# Patient Record
Sex: FEMALE | Race: WHITE
[De-identification: names, ages, dates, MRNs, and addresses within clinical notes are randomized per-mention and may not be internally consistent; named-entity substitution may affect disease eponyms.]

---

## 2017-10-20 ENCOUNTER — HOSPITAL ENCOUNTER (EMERGENCY)
Dept: HOSPITAL 47 - EC | Age: 37
Discharge: HOME | End: 2017-10-20
Payer: COMMERCIAL

## 2017-10-20 VITALS — DIASTOLIC BLOOD PRESSURE: 62 MMHG | SYSTOLIC BLOOD PRESSURE: 109 MMHG | HEART RATE: 87 BPM

## 2017-10-20 VITALS — TEMPERATURE: 98 F

## 2017-10-20 VITALS — RESPIRATION RATE: 18 BRPM

## 2017-10-20 DIAGNOSIS — F17.200: ICD-10-CM

## 2017-10-20 DIAGNOSIS — Z88.8: ICD-10-CM

## 2017-10-20 DIAGNOSIS — I25.2: ICD-10-CM

## 2017-10-20 DIAGNOSIS — Z88.1: ICD-10-CM

## 2017-10-20 DIAGNOSIS — Z86.14: ICD-10-CM

## 2017-10-20 DIAGNOSIS — Z88.0: ICD-10-CM

## 2017-10-20 DIAGNOSIS — Z79.899: ICD-10-CM

## 2017-10-20 DIAGNOSIS — O03.4: Primary | ICD-10-CM

## 2017-10-20 DIAGNOSIS — Z79.82: ICD-10-CM

## 2017-10-20 DIAGNOSIS — Z91.048: ICD-10-CM

## 2017-10-20 DIAGNOSIS — O99.331: ICD-10-CM

## 2017-10-20 DIAGNOSIS — O99.411: ICD-10-CM

## 2017-10-20 DIAGNOSIS — Z3A.10: ICD-10-CM

## 2017-10-20 DIAGNOSIS — Z91.040: ICD-10-CM

## 2017-10-20 LAB
ALP SERPL-CCNC: 81 U/L (ref 38–126)
ALT SERPL-CCNC: 27 U/L (ref 9–52)
ANION GAP SERPL CALC-SCNC: 10 MMOL/L
APTT BLD: 23 SEC (ref 22–30)
AST SERPL-CCNC: 21 U/L (ref 14–36)
BASOPHILS # BLD AUTO: 0 K/UL (ref 0–0.2)
BASOPHILS NFR BLD AUTO: 0 %
BUN SERPL-SCNC: 6 MG/DL (ref 7–17)
CALCIUM SPEC-MCNC: 8.9 MG/DL (ref 8.4–10.2)
CH: 29.6
CHCM: 33.1
CHLORIDE SERPL-SCNC: 109 MMOL/L (ref 98–107)
CO2 SERPL-SCNC: 18 MMOL/L (ref 22–30)
EOSINOPHIL # BLD AUTO: 0.2 K/UL (ref 0–0.7)
EOSINOPHIL NFR BLD AUTO: 2 %
ERYTHROCYTE [DISTWIDTH] IN BLOOD BY AUTOMATED COUNT: 4.29 M/UL (ref 3.8–5.4)
ERYTHROCYTE [DISTWIDTH] IN BLOOD: 13.3 % (ref 11.5–15.5)
GLUCOSE SERPL-MCNC: 114 MG/DL (ref 74–99)
HCT VFR BLD AUTO: 38.5 % (ref 34–46)
HDW: 2.41
HGB BLD-MCNC: 12.3 GM/DL (ref 11.4–16)
INR PPP: 1 (ref ?–1.2)
LUC NFR BLD AUTO: 1 %
LYMPHOCYTES # SPEC AUTO: 2.3 K/UL (ref 1–4.8)
LYMPHOCYTES NFR SPEC AUTO: 21 %
MAGNESIUM SPEC-SCNC: 1.6 MG/DL (ref 1.6–2.3)
MCH RBC QN AUTO: 28.7 PG (ref 25–35)
MCHC RBC AUTO-ENTMCNC: 31.9 G/DL (ref 31–37)
MCV RBC AUTO: 89.8 FL (ref 80–100)
MONOCYTES # BLD AUTO: 0.3 K/UL (ref 0–1)
MONOCYTES NFR BLD AUTO: 3 %
NEUTROPHILS # BLD AUTO: 8 K/UL (ref 1.3–7.7)
NEUTROPHILS NFR BLD AUTO: 73 %
NON-AFRICAN AMERICAN GFR(MDRD): >60
PHOSPHATE SERPL-MCNC: 3.3 MG/DL (ref 2.5–4.5)
POTASSIUM SERPL-SCNC: 4 MMOL/L (ref 3.5–5.1)
PROT SERPL-MCNC: 6.3 G/DL (ref 6.3–8.2)
PT BLD: 9.9 SEC (ref 9–12)
SODIUM SERPL-SCNC: 137 MMOL/L (ref 137–145)
WBC # BLD AUTO: 0.06 10*3/UL
WBC # BLD AUTO: 11 K/UL (ref 3.8–10.6)
WBC (PEROX): 10.72

## 2017-10-20 PROCEDURE — 76801 OB US < 14 WKS SINGLE FETUS: CPT

## 2017-10-20 PROCEDURE — 86900 BLOOD TYPING SEROLOGIC ABO: CPT

## 2017-10-20 PROCEDURE — 86850 RBC ANTIBODY SCREEN: CPT

## 2017-10-20 PROCEDURE — 85025 COMPLETE CBC W/AUTO DIFF WBC: CPT

## 2017-10-20 PROCEDURE — 84702 CHORIONIC GONADOTROPIN TEST: CPT

## 2017-10-20 PROCEDURE — 99284 EMERGENCY DEPT VISIT MOD MDM: CPT

## 2017-10-20 PROCEDURE — 86901 BLOOD TYPING SEROLOGIC RH(D): CPT

## 2017-10-20 PROCEDURE — 80053 COMPREHEN METABOLIC PANEL: CPT

## 2017-10-20 PROCEDURE — 96361 HYDRATE IV INFUSION ADD-ON: CPT

## 2017-10-20 PROCEDURE — 85730 THROMBOPLASTIN TIME PARTIAL: CPT

## 2017-10-20 PROCEDURE — 83735 ASSAY OF MAGNESIUM: CPT

## 2017-10-20 PROCEDURE — 84100 ASSAY OF PHOSPHORUS: CPT

## 2017-10-20 PROCEDURE — 85610 PROTHROMBIN TIME: CPT

## 2017-10-20 PROCEDURE — 36415 COLL VENOUS BLD VENIPUNCTURE: CPT

## 2017-10-20 PROCEDURE — 96374 THER/PROPH/DIAG INJ IV PUSH: CPT

## 2017-10-20 NOTE — US
EXAMINATION TYPE: US OB <= 14 wk fetus

 

DATE OF EXAM: 10/20/2017

 

COMPARISON: NONE

 

CLINICAL HISTORY: Patient having heavy bleeding and severe cramping. Patient states that she has know
 for 3 or 4 weeks that she did not have a viable pregnancy. They were waiting for her to miscarry due
 to the fact that she is not a candidate for D &C due the fact that she recently had a heart attack. 
Patient morbidly obese.

 

EXAM PERFORMED:  Transabdominal (TA)

 

EXAM MEASUREMENTS:

 

GESTATIONAL AGE / DATING

 

Physician Established:  not established

Dates by LMP:  (14 weeks/0 days)  

** EDC: 4/20/18

Dates by First Scan:  not available 

Dates by Current Scan for: Unable to date by today's study 

 

MATERNAL ANATOMY 

 

Uterus: 12.5 x 6.2 x 7.2cm

Right Ovary: 2.4 x 1.8 x1.8cm

Left Ovary: 3.4 x 2.5 x 3.0cm

Post CDS / Adnexa: wnl

 

 

GESTATION / FETAL SURVEY

 

 

MSD: 1.7cm (6 weeks/3 days)

 

 

Date of LMP: 7/14/17

 

 

Probable gestational sac seen in lower uterine segment. 

 

Findings suggestive of a cystic focus associated with the left ovary

 

IMPRESSION:

Transabdominal imaging shows possible gestational sac as described. Imaging not optimal. Follow-up as
 indicated. No fetal pole identified.

## 2017-10-20 NOTE — ED
General Adult HPI





- General


Chief complaint: Vaginal Bleeding


Stated complaint: bleeding pregnancy


Time Seen by Provider: 10/20/17 11:57


Source: patient, RN notes reviewed, old records reviewed


Mode of arrival: wheelchair


Limitations: no limitations





- History of Present Illness


Initial comments: 





This is a 37-year-old female to the ER for evaluation regarding vaginal 

bleeding.  Patient is a  prior miscarriage currently undergoing 

miscarriage.  Patient states she is having significant bleeding today.  She 

states she is about 10 weeks pregnant.  No nausea no vomiting no feelings of 

lightheadedness or dizziness.  Patient has had multiple Occasions with prior 

pregnancies including preeclampsia high blood pressure and diabetes.  Patient 

states she has been having some bleeding for a few days and knows that she was 

having a miscarriage but the bleeding was more excessive today and with clots.  

Patient has mild abdominal cramping but no pain.





- Related Data


 Home Medications











 Medication  Instructions  Recorded  Confirmed


 


Aspirin [Adult Low Dose Aspirin EC] 81 mg PO DAILY 10/20/17 10/20/17


 


HYDROcodone/APAP 5-325MG [Norco 1 tab PO Q4HR PRN 10/20/17 10/20/17





5-325]   


 


Ibuprofen [Motrin] 600 mg PO Q6HR PRN 10/20/17 10/20/17


 


Ranitidine HCl [Zantac] 150 mg PO HS 10/20/17 10/20/17











 Allergies











Allergy/AdvReac Type Severity Reaction Status Date / Time


 


adhesive Allergy   Verified 10/20/17 12:11


 


albuterol Allergy   Verified 10/20/17 12:11


 


amoxicillin Allergy   Verified 10/20/17 12:11


 


ampicillin Allergy   Verified 10/20/17 12:11


 


azithromycin [From Zithromax] Allergy   Verified 10/20/17 12:11


 


erythromycin base Allergy   Verified 10/20/17 12:11


 


latex Allergy   Verified 10/20/17 12:11


 


penicillin V Allergy   Verified 10/20/17 12:11














Review of Systems


ROS Statement: 


Those systems with pertinent positive or pertinent negative responses have been 

documented in the HPI.





ROS Other: All systems not noted in ROS Statement are negative.





Past Medical History


Past Medical History: Myocardial Infarction (MI)


History of Any Multi-Drug Resistant Organisms: MRSA


Date of last positivie culture/infection: 


MDRO Source:: abscess abdomen


Past Surgical History:  Section


Past Psychological History: No Psychological Hx Reported


Smoking Status: Current every day smoker


Past Alcohol Use History: None Reported


Past Drug Use History: None Reported





General Exam


Limitations: no limitations


General appearance: alert, in no apparent distress


Head exam: Present: atraumatic, normocephalic, normal inspection


Eye exam: Present: normal appearance, PERRL, EOMI.  Absent: scleral icterus, 

conjunctival injection, periorbital swelling


ENT exam: Present: normal exam, mucous membranes moist


Neck exam: Present: normal inspection.  Absent: tenderness, meningismus, 

lymphadenopathy


Respiratory exam: Present: normal lung sounds bilaterally.  Absent: respiratory 

distress, wheezes, rales, rhonchi, stridor


Cardiovascular Exam: Present: regular rate, normal rhythm, normal heart sounds.

  Absent: systolic murmur, diastolic murmur, rubs, gallop, clicks


GI/Abdominal exam: Present: soft, normal bowel sounds.  Absent: distended, 

tenderness, guarding, rebound, rigid


Extremities exam: Present: normal inspection, full ROM, normal capillary 

refill.  Absent: tenderness, pedal edema, joint swelling, calf tenderness


Back exam: Present: normal inspection


Neurological exam: Present: alert, oriented X3, CN II-XII intact


Psychiatric exam: Present: normal affect, normal mood


Skin exam: Present: warm, dry, intact, normal color.  Absent: rash





Course


 Vital Signs











  10/20/17 10/20/17 10/20/17





  11:49 12:53 13:00


 


Temperature 98 F  


 


Pulse Rate 104 H 80 89


 


Respiratory 16 18 18





Rate   


 


Blood Pressure 134/77 128/75 118/70


 


O2 Sat by Pulse 100 96 97





Oximetry   














  10/20/17





  14:00


 


Temperature 


 


Pulse Rate 87


 


Respiratory 18





Rate 


 


Blood Pressure 109/62


 


O2 Sat by Pulse 98





Oximetry 














- Reevaluation(s)


Reevaluation #1: 





10/20/17 13:09


The patient's resting comfortably at this time, no significant bleeding noted


Reevaluation #2: 





10/20/17 14:15


Vaginal exam completed, blood evacuated from patient's vaginal vault





Medical Decision Making





- Medical Decision Making





37 female currently going to miscarriage, labwork is normal he will is normal, 

patient is asymptomatic, patient's bleeding is persistent.  Patient will be 

discharged home





- Lab Data


Result diagrams: 


 10/20/17 12:19





 10/20/17 12:19


 Lab Results











  10/20/17 10/20/17 10/20/17 Range/Units





  12:19 12:19 12:19 


 


WBC     (3.8-10.6)  k/uL


 


RBC     (3.80-5.40)  m/uL


 


Hgb     (11.4-16.0)  gm/dL


 


Hct     (34.0-46.0)  %


 


MCV     (80.0-100.0)  fL


 


MCH     (25.0-35.0)  pg


 


MCHC     (31.0-37.0)  g/dL


 


RDW     (11.5-15.5)  %


 


Plt Count     (150-450)  k/uL


 


Neutrophils %     %


 


Lymphocytes %     %


 


Monocytes %     %


 


Eosinophils %     %


 


Basophils %     %


 


Neutrophils #     (1.3-7.7)  k/uL


 


Lymphocytes #     (1.0-4.8)  k/uL


 


Monocytes #     (0-1.0)  k/uL


 


Eosinophils #     (0-0.7)  k/uL


 


Basophils #     (0-0.2)  k/uL


 


PT    9.9  (9.0-12.0)  sec


 


INR    1.0  (<1.2)  


 


APTT    23.0  (22.0-30.0)  sec


 


Sodium  137    (137-145)  mmol/L


 


Potassium  4.0    (3.5-5.1)  mmol/L


 


Chloride  109 H    ()  mmol/L


 


Carbon Dioxide  18 L    (22-30)  mmol/L


 


Anion Gap  10    mmol/L


 


BUN  6 L    (7-17)  mg/dL


 


Creatinine  0.61    (0.52-1.04)  mg/dL


 


Est GFR (MDRD) Af Amer  >60    (>60 ml/min/1.73 sqM)  


 


Est GFR (MDRD) Non-Af  >60    (>60 ml/min/1.73 sqM)  


 


Glucose  114 H    (74-99)  mg/dL


 


Calcium  8.9    (8.4-10.2)  mg/dL


 


Phosphorus  3.3    (2.5-4.5)  mg/dL


 


Magnesium  1.6    (1.6-2.3)  mg/dL


 


Total Bilirubin  0.3    (0.2-1.3)  mg/dL


 


AST  21    (14-36)  U/L


 


ALT  27    (9-52)  U/L


 


Alkaline Phosphatase  81    ()  U/L


 


Total Protein  6.3    (6.3-8.2)  g/dL


 


Albumin  3.5    (3.5-5.0)  g/dL


 


HCG, Quant  2574.6    mIU/mL


 


Blood Type   A Positive   


 


Blood Type Recheck   CABO Indicated   


 


Antibody Screen   NEGATIVE   


 


Spec Expiration Date   10/23/2017 - 2319   














  10/20/17 Range/Units





  12:19 


 


WBC  11.0 H  (3.8-10.6)  k/uL


 


RBC  4.29  (3.80-5.40)  m/uL


 


Hgb  12.3  (11.4-16.0)  gm/dL


 


Hct  38.5  (34.0-46.0)  %


 


MCV  89.8  (80.0-100.0)  fL


 


MCH  28.7  (25.0-35.0)  pg


 


MCHC  31.9  (31.0-37.0)  g/dL


 


RDW  13.3  (11.5-15.5)  %


 


Plt Count  273  (150-450)  k/uL


 


Neutrophils %  73  %


 


Lymphocytes %  21  %


 


Monocytes %  3  %


 


Eosinophils %  2  %


 


Basophils %  0  %


 


Neutrophils #  8.0 H  (1.3-7.7)  k/uL


 


Lymphocytes #  2.3  (1.0-4.8)  k/uL


 


Monocytes #  0.3  (0-1.0)  k/uL


 


Eosinophils #  0.2  (0-0.7)  k/uL


 


Basophils #  0.0  (0-0.2)  k/uL


 


PT   (9.0-12.0)  sec


 


INR   (<1.2)  


 


APTT   (22.0-30.0)  sec


 


Sodium   (137-145)  mmol/L


 


Potassium   (3.5-5.1)  mmol/L


 


Chloride   ()  mmol/L


 


Carbon Dioxide   (22-30)  mmol/L


 


Anion Gap   mmol/L


 


BUN   (7-17)  mg/dL


 


Creatinine   (0.52-1.04)  mg/dL


 


Est GFR (MDRD) Af Amer   (>60 ml/min/1.73 sqM)  


 


Est GFR (MDRD) Non-Af   (>60 ml/min/1.73 sqM)  


 


Glucose   (74-99)  mg/dL


 


Calcium   (8.4-10.2)  mg/dL


 


Phosphorus   (2.5-4.5)  mg/dL


 


Magnesium   (1.6-2.3)  mg/dL


 


Total Bilirubin   (0.2-1.3)  mg/dL


 


AST   (14-36)  U/L


 


ALT   (9-52)  U/L


 


Alkaline Phosphatase   ()  U/L


 


Total Protein   (6.3-8.2)  g/dL


 


Albumin   (3.5-5.0)  g/dL


 


HCG, Quant   mIU/mL


 


Blood Type   


 


Blood Type Recheck   


 


Antibody Screen   


 


Spec Expiration Date   














- Radiology Data


Radiology results: report reviewed (Ultrasound positive for fetal demise), 

image reviewed





Disposition


Clinical Impression: 


 Incomplete , Missed , Vaginal bleeding





Disposition: HOME SELF-CARE


Condition: Good


Instructions:  Miscarriage (ED)


Referrals: 


Katy Tay MD [Primary Care Provider] - 1-2 days

## 2017-10-21 ENCOUNTER — HOSPITAL ENCOUNTER (EMERGENCY)
Dept: HOSPITAL 47 - EC | Age: 37
Discharge: OTHER INSTITUTION | End: 2017-10-21
Payer: COMMERCIAL

## 2017-10-21 VITALS — SYSTOLIC BLOOD PRESSURE: 114 MMHG | DIASTOLIC BLOOD PRESSURE: 72 MMHG

## 2017-10-21 VITALS — RESPIRATION RATE: 18 BRPM | HEART RATE: 91 BPM | TEMPERATURE: 97.4 F

## 2017-10-21 DIAGNOSIS — Z91.040: ICD-10-CM

## 2017-10-21 DIAGNOSIS — O03.4: Primary | ICD-10-CM

## 2017-10-21 DIAGNOSIS — Z86.14: ICD-10-CM

## 2017-10-21 DIAGNOSIS — D64.9: ICD-10-CM

## 2017-10-21 DIAGNOSIS — Z3A.01: ICD-10-CM

## 2017-10-21 DIAGNOSIS — F17.200: ICD-10-CM

## 2017-10-21 DIAGNOSIS — Z91.048: ICD-10-CM

## 2017-10-21 DIAGNOSIS — Z98.890: ICD-10-CM

## 2017-10-21 DIAGNOSIS — O99.011: ICD-10-CM

## 2017-10-21 DIAGNOSIS — Z88.8: ICD-10-CM

## 2017-10-21 DIAGNOSIS — Z79.899: ICD-10-CM

## 2017-10-21 DIAGNOSIS — Z88.1: ICD-10-CM

## 2017-10-21 DIAGNOSIS — Z88.0: ICD-10-CM

## 2017-10-21 DIAGNOSIS — O99.331: ICD-10-CM

## 2017-10-21 LAB
BASOPHILS # BLD AUTO: 0 K/UL (ref 0–0.2)
BASOPHILS # BLD AUTO: 0 K/UL (ref 0–0.2)
BASOPHILS NFR BLD AUTO: 0 %
BASOPHILS NFR BLD AUTO: 0 %
BILIRUB UR QL STRIP.AUTO: (no result)
CH: 29.1
CH: 29.3
CHCM: 32.5
CHCM: 32.9
EOSINOPHIL # BLD AUTO: 0.1 K/UL (ref 0–0.7)
EOSINOPHIL # BLD AUTO: 0.2 K/UL (ref 0–0.7)
EOSINOPHIL NFR BLD AUTO: 1 %
EOSINOPHIL NFR BLD AUTO: 1 %
ERYTHROCYTE [DISTWIDTH] IN BLOOD BY AUTOMATED COUNT: 3.27 M/UL (ref 3.8–5.4)
ERYTHROCYTE [DISTWIDTH] IN BLOOD BY AUTOMATED COUNT: 3.54 M/UL (ref 3.8–5.4)
ERYTHROCYTE [DISTWIDTH] IN BLOOD: 13.4 % (ref 11.5–15.5)
ERYTHROCYTE [DISTWIDTH] IN BLOOD: 13.4 % (ref 11.5–15.5)
HCT VFR BLD AUTO: 29.5 % (ref 34–46)
HCT VFR BLD AUTO: 31.7 % (ref 34–46)
HDW: 2.47
HDW: 2.48
HGB BLD-MCNC: 10.3 GM/DL (ref 11.4–16)
HGB BLD-MCNC: 9.4 GM/DL (ref 11.4–16)
LUC NFR BLD AUTO: 0 %
LUC NFR BLD AUTO: 0 %
LYMPHOCYTES # SPEC AUTO: 1.7 K/UL (ref 1–4.8)
LYMPHOCYTES # SPEC AUTO: 2 K/UL (ref 1–4.8)
LYMPHOCYTES NFR SPEC AUTO: 12 %
LYMPHOCYTES NFR SPEC AUTO: 16 %
MCH RBC QN AUTO: 28.8 PG (ref 25–35)
MCH RBC QN AUTO: 29.1 PG (ref 25–35)
MCHC RBC AUTO-ENTMCNC: 32 G/DL (ref 31–37)
MCHC RBC AUTO-ENTMCNC: 32.5 G/DL (ref 31–37)
MCV RBC AUTO: 89.5 FL (ref 80–100)
MCV RBC AUTO: 90.1 FL (ref 80–100)
MONOCYTES # BLD AUTO: 0.4 K/UL (ref 0–1)
MONOCYTES # BLD AUTO: 0.4 K/UL (ref 0–1)
MONOCYTES NFR BLD AUTO: 3 %
MONOCYTES NFR BLD AUTO: 3 %
NEUTROPHILS # BLD AUTO: 12.7 K/UL (ref 1.3–7.7)
NEUTROPHILS # BLD AUTO: 9.9 K/UL (ref 1.3–7.7)
NEUTROPHILS NFR BLD AUTO: 79 %
NEUTROPHILS NFR BLD AUTO: 84 %
PARTICLE COUNT: 7705
PH UR: 6 [PH] (ref 5–8)
PROT UR QL: (no result)
RBC UR QL: >182 /HPF (ref 0–5)
SP GR UR: 1.02 (ref 1–1.03)
SQUAMOUS UR QL AUTO: 1 /HPF (ref 0–4)
UA BILLING (MACRO VS. MICRO): (no result)
UROBILINOGEN UR QL STRIP: 3 MG/DL (ref ?–2)
WBC # BLD AUTO: 0.06 10*3/UL
WBC # BLD AUTO: 0.07 10*3/UL
WBC # BLD AUTO: 12.5 K/UL (ref 3.8–10.6)
WBC # BLD AUTO: 15 K/UL (ref 3.8–10.6)
WBC #/AREA URNS HPF: 9 /HPF (ref 0–5)
WBC (PEROX): 12.49
WBC (PEROX): 14.38

## 2017-10-21 PROCEDURE — 81001 URINALYSIS AUTO W/SCOPE: CPT

## 2017-10-21 PROCEDURE — 85025 COMPLETE CBC W/AUTO DIFF WBC: CPT

## 2017-10-21 PROCEDURE — 99285 EMERGENCY DEPT VISIT HI MDM: CPT

## 2017-10-21 PROCEDURE — 87086 URINE CULTURE/COLONY COUNT: CPT

## 2017-10-21 PROCEDURE — 76817 TRANSVAGINAL US OBSTETRIC: CPT

## 2017-10-21 PROCEDURE — 76801 OB US < 14 WKS SINGLE FETUS: CPT

## 2017-10-21 PROCEDURE — 96376 TX/PRO/DX INJ SAME DRUG ADON: CPT

## 2017-10-21 PROCEDURE — 96374 THER/PROPH/DIAG INJ IV PUSH: CPT

## 2017-10-21 PROCEDURE — 96375 TX/PRO/DX INJ NEW DRUG ADDON: CPT

## 2017-10-21 PROCEDURE — 36415 COLL VENOUS BLD VENIPUNCTURE: CPT

## 2017-10-21 PROCEDURE — 84702 CHORIONIC GONADOTROPIN TEST: CPT

## 2017-10-21 NOTE — US
EXAMINATION TYPE: US OB <=14 wks transvag

 

DATE OF EXAM: 10/21/2017

 

COMPARISON: TA US 10/20/2017

 

CLINICAL HISTORY: Pain. EC patient now with severe midline pelvic pain with vaginal bleeding x 3 days
; known non viable IUP per patient; D&C not performed due to MI; ; MI on 2017; right arm car
diac catheterization 10/18/2017 

 

EXAM PERFORMED:  Transvaginal (TV) and Transabdominal (TA)

 

EXAM MEASUREMENTS:

 

GESTATIONAL AGE / DATING

 

Physician Established: Not established  

Dates by LMP:  2017 (14 weeks/1 day)  

** EDC: 2018

Dates by First Scan:  not here   

Dates by Current Scan for: Gestational sac/amniotic sac imaged in HENRY and cervix on Today's US (6 wee
ks/5 days)  

 

 

MATERNAL ANATOMY 

 

Uterus: 12.8 x 6.7 x 6.2cm 

Right Ovary: 2.7 x 3.3 x 1.4cm TA US 

Left Ovary: 3.1 x 2.1 x 2.5cm TV US

Post CDS / Adnexa: wnl

Presence of free fluid: no

Presence of corpus luteal cyst: possibly in left ovary

Presence of subchorionic bleed: no

 

GESTATION / FETAL SURVEY

 

CRL: none seen/empty amniotic sac 

MSD: 2.2cm and is irregular in appearance in HENRY and cervix; (6 weeks/5 days)

Yolk Sac (normal less than 6mm): 2.6mm

Heart Rate: NA as no fetal pole is seen

IUP:  Gestational sac and Amniotic sac is in abnormal location inn HENRY /cervix

 

 

Date of LMP: 2017

Beta HcG (if available):  1494.9

 

Single, gestational sac/empty amniotic sac in HENRY and Cervix; no fetal pole is noted.

 

Known nonviable IUP reported by patient.

 

IMPRESSION:

Suspected blighted ovum. The estimated gestational age should be 6 weeks 5 days based on mean sac dick
meter which is irregular. Typically a fetal pole with heart rate is identified at this gestational ag
e.

## 2017-10-21 NOTE — ED
Abdominal Pain HPI





- General


Chief Complaint: Abdominal Pain


Stated Complaint: Abd Pain


Time Seen by Provider: 10/21/17 11:46


Source: patient, EMS, RN notes reviewed


Mode of arrival: EMS


Limitations: no limitations





- History of Present Illness


Initial Comments: 





This a 37-year-old female presents emergency Department via EMS from chief 

complaint abdominal pain and vaginal bleeding.  Patient states that she found 

out was pregnant in August and was told she was having a miscarriage at the end 

of September.  Patient states she's been waiting to pass products of conception 

as she was not a candidate for D&C as she had an MI in August.  Patient's was 

seen at [facility] and [place] for the MI.  Patient states that she started 

bleeding was seen here yesterday and had an ultrasound and lab work.  Patient 

was given pain medication with pain medication has not helped.  Patient states 

that the bleeding has slowed but continues.  She did have a pelvic exam 

yesterday in which her cervix was closed at that time.  Patient states that she 

was seen at high-risk pregnancy [facility] in [facility].  Patient states that 

her 2 previous pregnancies to this she was high risk was seen and [facility].





- Related Data


 Home Medications











 Medication  Instructions  Recorded  Confirmed


 


HYDROcodone/APAP 5-325MG [Norco 1 tab PO Q6H PRN 10/20/17 10/21/17





5-325]   


 


Ibuprofen [Motrin] 600 mg PO Q6HR PRN 10/20/17 10/21/17


 


Ranitidine HCl [Zantac] 150 mg PO HS 10/20/17 10/21/17











 Allergies











Allergy/AdvReac Type Severity Reaction Status Date / Time


 


adhesive Allergy  Unknown Verified 10/21/17 12:25


 


albuterol Allergy  Unknown Verified 10/21/17 12:25


 


amoxicillin Allergy  Unknown Verified 10/21/17 12:25


 


ampicillin Allergy  Unknown Verified 10/21/17 12:25


 


azithromycin [From Zithromax] Allergy  Unknown Verified 10/21/17 12:25


 


erythromycin base Allergy  Unknown Verified 10/21/17 12:25


 


latex Allergy  Unknown Verified 10/21/17 12:25


 


penicillin V Allergy  Unknown Verified 10/21/17 12:25














Review of Systems


ROS Statement: 


Those systems with pertinent positive or pertinent negative responses have been 

documented in the HPI.





ROS Other: All systems not noted in ROS Statement are negative.





Past Medical History


Past Medical History: Myocardial Infarction (MI)


History of Any Multi-Drug Resistant Organisms: MRSA


Date of last positivie culture/infection: 


MDRO Source:: abscess abdomen


Past Surgical History:  Section, Cholecystectomy, Heart Catheterization


Additional Past Surgical History / Comment(s): right wrist surgery, carpal 

tunnel, MRSA S/P wound. left knee surgery


Past Psychological History: No Psychological Hx Reported


Smoking Status: Current every day smoker


Past Alcohol Use History: None Reported


Past Drug Use History: None Reported





General Exam


Limitations: no limitations


General appearance: alert, in no apparent distress


Head exam: Present: atraumatic, normocephalic, normal inspection


Respiratory exam: Present: normal lung sounds bilaterally.  Absent: respiratory 

distress, wheezes, rales, rhonchi, stridor


Cardiovascular Exam: Present: regular rate, normal rhythm, normal heart sounds.

  Absent: systolic murmur, diastolic murmur, rubs, gallop, clicks


GI/Abdominal exam: Present: soft, tenderness (Moderate lower abdominal), normal 

bowel sounds.  Absent: distended, guarding, rebound, rigid


External exam: Present: normal external exam, other (Exam performed with 

Natalie HIGH)


Speculum exam: Present: vaginal bleeding


Back exam: Absent: CVA tenderness (R), CVA tenderness (L)


Skin exam: Present: warm, dry, intact, normal color.  Absent: rash





Course


 Vital Signs











  10/21/17 10/21/17





  11:41 16:44


 


Temperature 98.3 F 


 


Pulse Rate 116 H 95


 


Respiratory 20 16





Rate  


 


Blood Pressure 132/75 114/72


 


O2 Sat by Pulse 100 98





Oximetry  














Medical Decision Making





- Medical Decision Making





37-year-old female presented for vaginal bleeding and pelvic pain.  Patient 

does have some retained products noted, patient's hemoglobin has dropped from 

12.4-90.4.  Patient still having bleeding noted.  Patient case was discussed 

with on-call OB/GYN Dr. Hannah who recommends the patient be transferred back 

to [facility] secondary to patient having an MI and evaluated there.  

Patient's records were obtained from [facility] which showed LAD 

dissection with healing at that time.  Patient is high risk for a procedures.  

Patient will be sent down there for repeat hemoglobins and if needed D&C under 

the cardiologist supervision who saw her for her MI. Dr hansen did discuss case 

with for [facility]





- Lab Data


Result diagrams: 


 10/21/17 16:09





 Lab Results











  10/21/17 10/21/17 10/21/17 Range/Units





  12:25 12:25 12:25 


 


WBC  15.0 H    (3.8-10.6)  k/uL


 


RBC  3.54 L    (3.80-5.40)  m/uL


 


Hgb  10.3 L    (11.4-16.0)  gm/dL


 


Hct  31.7 L    (34.0-46.0)  %


 


MCV  89.5    (80.0-100.0)  fL


 


MCH  29.1    (25.0-35.0)  pg


 


MCHC  32.5    (31.0-37.0)  g/dL


 


RDW  13.4    (11.5-15.5)  %


 


Plt Count  253    (150-450)  k/uL


 


Neutrophils %  84    %


 


Lymphocytes %  12    %


 


Monocytes %  3    %


 


Eosinophils %  1    %


 


Basophils %  0    %


 


Neutrophils #  12.7 H    (1.3-7.7)  k/uL


 


Lymphocytes #  1.7    (1.0-4.8)  k/uL


 


Monocytes #  0.4    (0-1.0)  k/uL


 


Eosinophils #  0.2    (0-0.7)  k/uL


 


Basophils #  0.0    (0-0.2)  k/uL


 


HCG, Quant   1494.9   mIU/mL


 


Urine Color    Light Red  


 


Urine Appearance    Cloudy H  (Clear)  


 


Urine pH    6.0  (5.0-8.0)  


 


Ur Specific Gravity    1.025  (1.001-1.035)  


 


Urine Protein    1+ H  (Negative)  


 


Urine Glucose (UA)    Negative  (Negative)  


 


Urine Ketones    Trace H  (Negative)  


 


Urine Blood    Large H  (Negative)  


 


Urine Nitrite    Negative  (Negative)  


 


Urine Bilirubin    2+ H  (Negative)  


 


Urine Urobilinogen    3.0  (<2.0)  mg/dL


 


Ur Leukocyte Esterase    Moderate H  (Negative)  


 


Urine RBC    >182 H  (0-5)  /hpf


 


Urine WBC    9 H  (0-5)  /hpf


 


Ur Squamous Epith Cells    1  (0-4)  /hpf


 


Hyaline Casts    4 H  (0-2)  /lpf


 


Urine Mucus    Few H  (None)  /hpf














  10/21/17 Range/Units





  16:09 


 


WBC  12.5 H  (3.8-10.6)  k/uL


 


RBC  3.27 L  (3.80-5.40)  m/uL


 


Hgb  9.4 L  (11.4-16.0)  gm/dL


 


Hct  29.5 L  (34.0-46.0)  %


 


MCV  90.1  (80.0-100.0)  fL


 


MCH  28.8  (25.0-35.0)  pg


 


MCHC  32.0  (31.0-37.0)  g/dL


 


RDW  13.4  (11.5-15.5)  %


 


Plt Count  239  (150-450)  k/uL


 


Neutrophils %  79  %


 


Lymphocytes %  16  %


 


Monocytes %  3  %


 


Eosinophils %  1  %


 


Basophils %  0  %


 


Neutrophils #  9.9 H  (1.3-7.7)  k/uL


 


Lymphocytes #  2.0  (1.0-4.8)  k/uL


 


Monocytes #  0.4  (0-1.0)  k/uL


 


Eosinophils #  0.1  (0-0.7)  k/uL


 


Basophils #  0.0  (0-0.2)  k/uL


 


HCG, Quant   mIU/mL


 


Urine Color   


 


Urine Appearance   (Clear)  


 


Urine pH   (5.0-8.0)  


 


Ur Specific Gravity   (1.001-1.035)  


 


Urine Protein   (Negative)  


 


Urine Glucose (UA)   (Negative)  


 


Urine Ketones   (Negative)  


 


Urine Blood   (Negative)  


 


Urine Nitrite   (Negative)  


 


Urine Bilirubin   (Negative)  


 


Urine Urobilinogen   (<2.0)  mg/dL


 


Ur Leukocyte Esterase   (Negative)  


 


Urine RBC   (0-5)  /hpf


 


Urine WBC   (0-5)  /hpf


 


Ur Squamous Epith Cells   (0-4)  /hpf


 


Hyaline Casts   (0-2)  /lpf


 


Urine Mucus   (None)  /hpf














Disposition


Clinical Impression: 


 Anemia, Incomplete miscarriage, Vaginal bleeding





Narrative: 





History of LAD dissection


Disposition: OTHER INSTITUTION NOT DEFINED


Condition: Stable


Referrals: 


Katy Tay MD [Primary Care Provider] - 1-2 days





- Out of Hospital Transfer - Req. Specs


Out of Hospital Transfer - Requested Specifics: Other Emergency Center ([facility])

## 2018-10-30 ENCOUNTER — HOSPITAL ENCOUNTER (OUTPATIENT)
Dept: HOSPITAL 47 - EC | Age: 38
Setting detail: OBSERVATION
LOS: 1 days | Discharge: HOME | End: 2018-10-31
Attending: INTERNAL MEDICINE | Admitting: INTERNAL MEDICINE
Payer: COMMERCIAL

## 2018-10-30 DIAGNOSIS — Z86.711: ICD-10-CM

## 2018-10-30 DIAGNOSIS — Z81.8: ICD-10-CM

## 2018-10-30 DIAGNOSIS — Z91.048: ICD-10-CM

## 2018-10-30 DIAGNOSIS — Z79.01: ICD-10-CM

## 2018-10-30 DIAGNOSIS — Z84.1: ICD-10-CM

## 2018-10-30 DIAGNOSIS — Z80.49: ICD-10-CM

## 2018-10-30 DIAGNOSIS — M19.90: ICD-10-CM

## 2018-10-30 DIAGNOSIS — Z83.3: ICD-10-CM

## 2018-10-30 DIAGNOSIS — Z79.899: ICD-10-CM

## 2018-10-30 DIAGNOSIS — Z82.3: ICD-10-CM

## 2018-10-30 DIAGNOSIS — I25.2: ICD-10-CM

## 2018-10-30 DIAGNOSIS — K08.89: ICD-10-CM

## 2018-10-30 DIAGNOSIS — J00: ICD-10-CM

## 2018-10-30 DIAGNOSIS — E66.01: ICD-10-CM

## 2018-10-30 DIAGNOSIS — Z90.49: ICD-10-CM

## 2018-10-30 DIAGNOSIS — Z82.49: ICD-10-CM

## 2018-10-30 DIAGNOSIS — Z91.040: ICD-10-CM

## 2018-10-30 DIAGNOSIS — Z95.1: ICD-10-CM

## 2018-10-30 DIAGNOSIS — Z88.1: ICD-10-CM

## 2018-10-30 DIAGNOSIS — I82.532: ICD-10-CM

## 2018-10-30 DIAGNOSIS — F17.200: ICD-10-CM

## 2018-10-30 DIAGNOSIS — Z88.8: ICD-10-CM

## 2018-10-30 DIAGNOSIS — Z86.718: ICD-10-CM

## 2018-10-30 DIAGNOSIS — Z86.79: ICD-10-CM

## 2018-10-30 DIAGNOSIS — R07.89: Primary | ICD-10-CM

## 2018-10-30 DIAGNOSIS — Z86.14: ICD-10-CM

## 2018-10-30 DIAGNOSIS — Z88.0: ICD-10-CM

## 2018-10-30 LAB
ALBUMIN SERPL-MCNC: 4 G/DL (ref 3.5–5)
ALP SERPL-CCNC: 80 U/L (ref 38–126)
ALT SERPL-CCNC: 29 U/L (ref 9–52)
ANION GAP SERPL CALC-SCNC: 8 MMOL/L
APTT BLD: 22.2 SEC (ref 22–30)
AST SERPL-CCNC: 19 U/L (ref 14–36)
BASOPHILS # BLD AUTO: 0 K/UL (ref 0–0.2)
BASOPHILS NFR BLD AUTO: 1 %
BUN SERPL-SCNC: 14 MG/DL (ref 7–17)
CALCIUM SPEC-MCNC: 9.6 MG/DL (ref 8.4–10.2)
CHLORIDE SERPL-SCNC: 103 MMOL/L (ref 98–107)
CHOLEST SERPL-MCNC: 187 MG/DL (ref ?–200)
CK SERPL-CCNC: 65 U/L (ref 30–135)
CK SERPL-CCNC: 79 U/L (ref 30–135)
CO2 SERPL-SCNC: 28 MMOL/L (ref 22–30)
EOSINOPHIL # BLD AUTO: 0.2 K/UL (ref 0–0.7)
EOSINOPHIL NFR BLD AUTO: 2 %
ERYTHROCYTE [DISTWIDTH] IN BLOOD BY AUTOMATED COUNT: 4.58 M/UL (ref 3.8–5.4)
ERYTHROCYTE [DISTWIDTH] IN BLOOD: 13.8 % (ref 11.5–15.5)
GLUCOSE SERPL-MCNC: 107 MG/DL (ref 74–99)
HCT VFR BLD AUTO: 40 % (ref 34–46)
HDLC SERPL-MCNC: 39 MG/DL (ref 40–60)
HGB BLD-MCNC: 13.5 GM/DL (ref 11.4–16)
INR PPP: 1 (ref ?–1.2)
LDLC SERPL CALC-MCNC: 109 MG/DL (ref 0–99)
LYMPHOCYTES # SPEC AUTO: 2.6 K/UL (ref 1–4.8)
LYMPHOCYTES NFR SPEC AUTO: 27 %
MAGNESIUM SPEC-SCNC: 2 MG/DL (ref 1.6–2.3)
MCH RBC QN AUTO: 29.5 PG (ref 25–35)
MCHC RBC AUTO-ENTMCNC: 33.8 G/DL (ref 31–37)
MCV RBC AUTO: 87.4 FL (ref 80–100)
MONOCYTES # BLD AUTO: 0.4 K/UL (ref 0–1)
MONOCYTES NFR BLD AUTO: 4 %
NEUTROPHILS # BLD AUTO: 6.3 K/UL (ref 1.3–7.7)
NEUTROPHILS NFR BLD AUTO: 65 %
PLATELET # BLD AUTO: 291 K/UL (ref 150–450)
POTASSIUM SERPL-SCNC: 4.1 MMOL/L (ref 3.5–5.1)
PROT SERPL-MCNC: 7.1 G/DL (ref 6.3–8.2)
PT BLD: 9.7 SEC (ref 9–12)
SODIUM SERPL-SCNC: 139 MMOL/L (ref 137–145)
TRIGL SERPL-MCNC: 193 MG/DL (ref ?–150)
TROPONIN I SERPL-MCNC: <0.012 NG/ML (ref 0–0.03)
TROPONIN I SERPL-MCNC: <0.012 NG/ML (ref 0–0.03)
WBC # BLD AUTO: 9.6 K/UL (ref 3.8–10.6)

## 2018-10-30 PROCEDURE — 74174 CTA ABD&PLVS W/CONTRAST: CPT

## 2018-10-30 PROCEDURE — 93005 ELECTROCARDIOGRAM TRACING: CPT

## 2018-10-30 PROCEDURE — 80053 COMPREHEN METABOLIC PANEL: CPT

## 2018-10-30 PROCEDURE — 99285 EMERGENCY DEPT VISIT HI MDM: CPT

## 2018-10-30 PROCEDURE — 82550 ASSAY OF CK (CPK): CPT

## 2018-10-30 PROCEDURE — 36415 COLL VENOUS BLD VENIPUNCTURE: CPT

## 2018-10-30 PROCEDURE — 80061 LIPID PANEL: CPT

## 2018-10-30 PROCEDURE — 85025 COMPLETE CBC W/AUTO DIFF WBC: CPT

## 2018-10-30 PROCEDURE — 96361 HYDRATE IV INFUSION ADD-ON: CPT

## 2018-10-30 PROCEDURE — 71275 CT ANGIOGRAPHY CHEST: CPT

## 2018-10-30 PROCEDURE — 96375 TX/PRO/DX INJ NEW DRUG ADDON: CPT

## 2018-10-30 PROCEDURE — 85610 PROTHROMBIN TIME: CPT

## 2018-10-30 PROCEDURE — 82553 CREATINE MB FRACTION: CPT

## 2018-10-30 PROCEDURE — 83735 ASSAY OF MAGNESIUM: CPT

## 2018-10-30 PROCEDURE — 93306 TTE W/DOPPLER COMPLETE: CPT

## 2018-10-30 PROCEDURE — 85730 THROMBOPLASTIN TIME PARTIAL: CPT

## 2018-10-30 PROCEDURE — 96374 THER/PROPH/DIAG INJ IV PUSH: CPT

## 2018-10-30 PROCEDURE — 71046 X-RAY EXAM CHEST 2 VIEWS: CPT

## 2018-10-30 PROCEDURE — 84484 ASSAY OF TROPONIN QUANT: CPT

## 2018-10-30 PROCEDURE — 94640 AIRWAY INHALATION TREATMENT: CPT

## 2018-10-30 RX ADMIN — NITROGLYCERIN SCH INCH: 20 OINTMENT TOPICAL at 23:12

## 2018-10-30 RX ADMIN — NITROGLYCERIN SCH: 20 OINTMENT TOPICAL at 23:13

## 2018-10-30 RX ADMIN — APIXABAN SCH MG: 5 TABLET, FILM COATED ORAL at 23:12

## 2018-10-30 RX ADMIN — ACETAMINOPHEN PRN MG: 500 TABLET ORAL at 23:48

## 2018-10-30 NOTE — CT
EXAM:

  CT Angiography Chest With Intravenous Contrast

 

CLINICAL HISTORY:

  ITS.REASON CT Reason: r/o dissection

 

TECHNIQUE:

  Axial computed tomographic angiography images of the chest with 

intravenous contrast using pulmonary embolism protocol.  CTDI is 55 mGy 

and DLP is 3583 mGy-cm.  This CT exam was performed using one or more of 

the following dose reduction techniques: automated exposure control, 

adjustment of the mA and/or kV according to patient size, and/or use of 

iterative reconstruction technique.

  MIP reconstructed images were created and reviewed.

 

COMPARISON:

  No relevant prior studies available.

 

FINDINGS:

  Pulmonary arteries:  No filling defects.

  Aorta:  No acute findings.  No thoracic aortic aneurysm.

  Lungs:  No mass.  No consolidation.

  Pleural space:  No significant effusion.  No pneumothorax.

  Heart:  No cardiomegaly or pericardial effusion.

  Bones/joints:  No acute fracture or dislocation.

  Soft tissues:  Unremarkable.

  Lymph nodes:  No enlarged lymph nodes.

 

IMPRESSION:     

  No acute intrathoracic findings.

 

_______________________________________________

 

EXAM:

  CT Angiography Abdomen and Pelvis With Intravenous Contrast

 

CLINICAL HISTORY:

  ITS.REASON CT Reason: r/o dissection

 

TECHNIQUE:

  Axial computed tomographic angiography images of the abdomen and pelvis 

with intravenous contrast.  CTDI is 55 mGy and DLP is 3583 mGy-cm.  This 

CT exam was performed using one or more of the following dose reduction 

techniques: automated exposure control, adjustment of the mA and/or kV 

according to patient size, and/or use of iterative reconstruction 

technique.

  MIP reconstructed images were created and reviewed.

 

COMPARISON:

  No relevant prior studies available.

 

FINDINGS:

 

 VASCULATURE:

  Aorta:  No acute findings.  No abdominal aortic aneurysm.  No 

dissection.

  Celiac trunk and mesenteric arteries:  No acute findings.  No occlusion 

or significant stenosis.

  Renal arteries:  No acute findings.  No occlusion or significant 

stenosis.

  Iliac arteries:  No acute findings.  No occlusion or significant 

stenosis.

 

  Lung bases:  Unremarkable.  No mass.  No consolidation.

 

 ABDOMEN:

  Liver:  Unremarkable.  No mass.

  Gallbladder and bile ducts: Cholecystectomy.  

  Pancreas:  Unremarkable.  No ductal dilation.  No mass.

  Spleen:  Unremarkable.  No splenomegaly.

  Adrenals:  Unremarkable.  No mass.

  Kidneys and ureters:  Unremarkable.  No hydronephrosis.  No solid mass.

  Stomach and bowel:  Unremarkable.  No obstruction.  No mucosal 

thickening.

 

 PELVIS:

  Appendix:  No findings to suggest acute appendicitis.

  Bladder:  Unremarkable.  No mass.

  Reproductive:  Unremarkable.

 

 ABDOMEN and PELVIS:

  Intraperitoneal space:  Unremarkable.  No significant fluid collection. 

 No free air.

  Bones/joints:  No acute fracture.  No dislocation.

  Soft tissues:  Unremarkable.

  Lymph nodes:  Unremarkable.  No enlarged lymph nodes.

 

IMPRESSION:     

  No acute findings in the arterial system of the abdomen and pelvis.

## 2018-10-30 NOTE — ED
General Adult HPI





- General


Chief complaint: Chest Pain


Stated complaint: chest pain


Time Seen by Provider: 10/30/18 14:00


Source: patient, RN notes reviewed


Mode of arrival: ambulatory


Limitations: no limitations





- History of Present Illness


Initial comments: 





This is a 38-year-old female presents emergency Department with a past medical 

history significant for dissecting coronary artery and bypass of the LAD.  

Patient states she also has a history of pulmonary embolisms and DVTs so she is 

on eliquis.  Patient states for the last 2 days she's been having chest pain 

center of her chest with no radiation.  Patient states today the pain got 

considerably worse approximate one hour prior to arrival.  Patient denies any 

difficulty breathing or shortness of breath.  Patient denies any sweating 

episodes.  Patient denies any nausea vomiting diarrhea.  Patient denies any 

abdominal pain.  Patient denies any lightheadedness dizziness or near syncopal 

episode.  Patient states this pain feels same pain she has with her previous 

heart attacks.  Patient states she has had multiple heart attacks in the past.  

Patient denies any recent fever chills or cough.





- Related Data


 Home Medications











 Medication  Instructions  Recorded  Confirmed


 


Ranitidine HCl [Zantac] 150 mg PO HS 10/20/17 10/30/18


 


Acetaminophen Tab [Tylenol Tab] 1,000 mg PO Q6HR PRN 10/30/18 10/30/18


 


Apixaban [Eliquis] 5 mg PO BID 10/30/18 10/30/18


 


SILVER sulfADIAZINE Cream 1 applic TOPICAL DAILY PRN 10/30/18 10/30/18





[Silvadene 1% Cream]   











 Allergies











Allergy/AdvReac Type Severity Reaction Status Date / Time


 


adhesive Allergy  Unknown Verified 10/30/18 15:56


 


albuterol Allergy  Unknown Verified 10/30/18 15:56


 


amoxicillin Allergy  Unknown Verified 10/30/18 15:56


 


ampicillin Allergy  Unknown Verified 10/30/18 15:56


 


azithromycin [From Zithromax] Allergy  Unknown Verified 10/30/18 15:56


 


erythromycin base Allergy  Unknown Verified 10/30/18 15:56


 


latex Allergy  Unknown Verified 10/30/18 15:56


 


penicillin V Allergy  Unknown Verified 10/30/18 15:56














Review of Systems


ROS Statement: 


Those systems with pertinent positive or pertinent negative responses have been 

documented in the HPI.





ROS Other: All systems not noted in ROS Statement are negative.





Past Medical History


Past Medical History: Deep Vein Thrombosis (DVT), Myocardial Infarction (MI)


History of Any Multi-Drug Resistant Organisms: MRSA


Date of last positivie culture/infection: 


MDRO Source:: abscess abdomen


Past Surgical History:  Section, Cholecystectomy, Coronary Bypass/CABG, 

Heart Catheterization


Additional Past Surgical History / Comment(s): right wrist surgery, carpal 

tunnel, MRSA S/P wound. left knee surgery


Past Psychological History: No Psychological Hx Reported


Smoking Status: Current every day smoker


Past Alcohol Use History: None Reported


Past Drug Use History: None Reported





General Exam





- General Exam Comments


Initial Comments: 





GENERAL:


Patient is well-developed and well-nourished.  Patient is nontoxic and well-

hydrated and is in mild distress.





ENT:


Neck is soft and supple.  No significant lymphadenopathy is noted.  Oropharynx 

is clear.  Moist mucous membranes.  Neck has full range of motion without 

eliciting any pain. 





EYES:


The sclera were anicteric and conjunctiva were pink and moist.  Extraocular 

movements were intact and pupils were equal round and reactive to light.  

Eyelids were unremarkable.





PULMONARY:


Unlabored respirations.  Good breath sounds bilaterally.  No audible rales 

rhonchi or wheezing was noted.





CARDIOVASCULAR:


There is a regular rate and rhythm without any murmurs gallops or rubs. 





ABDOMEN:


Soft and nontender with normal bowel sounds.  No palpable organomegaly was 

noted.  There is no palpable pulsatile mass.





SKIN:


Skin is clear with no lesions or rashes and otherwise unremarkable.





NEUROLOGIC:


Patient is alert and oriented x3.  Cranial nerves II through XII are grossly 

intact.  Motor and sensory are also intact.  Normal speech, volume and content.

  Symmetrical smile.  





MUSCULOSKELETAL:


Normal extremities with adequate strength and full range of motion.  No lower 

extremity swelling or edema.  No calf tenderness.





LYMPHATICS:


No significant lymphadenopathy is noted





PSYCHIATRIC:


Normal psychiatric evaluation.  Normal interpersonal interactions appears 

functionally intact in deals appropriately with others.  No signs of 

depression.  No signs of anxiety.  


Limitations: no limitations





Course


 Vital Signs











  10/30/18 10/30/18 10/30/18





  13:58 14:20 14:37


 


Temperature 98.2 F  


 


Pulse Rate 106 H 100 


 


Pulse Rate [   100





Apical]   


 


Respiratory 22 18 





Rate   


 


Blood Pressure 120/79 134/97 


 


O2 Sat by Pulse 99 97 





Oximetry   














  10/30/18 10/30/18





  14:55 15:25


 


Temperature  


 


Pulse Rate 97 89


 


Pulse Rate [  





Apical]  


 


Respiratory 18 18





Rate  


 


Blood Pressure 140/92 121/88


 


O2 Sat by Pulse 99 96





Oximetry  














Medical Decision Making





- Medical Decision Making





EKG shows normal sinus rhythm at 97 bpm SC interval 138 QRS is 78 QT interval 3:

30 for QT C is 424.  Patient's EKG shows no ST segment elevation or depression 

or T wave abnormalities are noted.





Repeat EKG was done because the first EKG had quite a bit of noise in the 

inferior leads.  Second EKG showed normal sinus rhythm at 91 bpm SC interval is 

144 QRS is 82 QT interval 356 QTC is 437.  Patient's EKG shows no ST segment 

elevation.                                                                     

                                                                               

                                                                               

                                                                               

                                                                               

                                                                               

                                                                               

                                                                               

                                                                               

                                                                               

                                                                               

                                                                               

                                                                               

                                                                               

                                                                               

                                                                               

                                                   





Chest x-ray shows no acute abnormality.





Patient's pain improved significantly while in the emergency department.





I spoke with Dr. Rebecca Islas came down and saw the patient immediately.





I wrote admitting orders I consult cardiology.





- Lab Data


Result diagrams: 


 10/30/18 14:29





 10/30/18 14:29


 Lab Results











  10/30/18 10/30/18 10/30/18 Range/Units





  14:29 14:29 14:29 


 


WBC   9.6   (3.8-10.6)  k/uL


 


RBC   4.58   (3.80-5.40)  m/uL


 


Hgb   13.5   (11.4-16.0)  gm/dL


 


Hct   40.0   (34.0-46.0)  %


 


MCV   87.4   (80.0-100.0)  fL


 


MCH   29.5   (25.0-35.0)  pg


 


MCHC   33.8   (31.0-37.0)  g/dL


 


RDW   13.8   (11.5-15.5)  %


 


Plt Count   291   (150-450)  k/uL


 


Neutrophils %   65   %


 


Lymphocytes %   27   %


 


Monocytes %   4   %


 


Eosinophils %   2   %


 


Basophils %   1   %


 


Neutrophils #   6.3   (1.3-7.7)  k/uL


 


Lymphocytes #   2.6   (1.0-4.8)  k/uL


 


Monocytes #   0.4   (0-1.0)  k/uL


 


Eosinophils #   0.2   (0-0.7)  k/uL


 


Basophils #   0.0   (0-0.2)  k/uL


 


PT     (9.0-12.0)  sec


 


INR     (<1.2)  


 


APTT     (22.0-30.0)  sec


 


Sodium    139  (137-145)  mmol/L


 


Potassium    4.1  (3.5-5.1)  mmol/L


 


Chloride    103  ()  mmol/L


 


Carbon Dioxide    28  (22-30)  mmol/L


 


Anion Gap    8  mmol/L


 


BUN    14  (7-17)  mg/dL


 


Creatinine    0.76  (0.52-1.04)  mg/dL


 


Est GFR (CKD-EPI)AfAm    >90  (>60 ml/min/1.73 sqM)  


 


Est GFR (CKD-EPI)NonAf    >90  (>60 ml/min/1.73 sqM)  


 


Glucose    107 H  (74-99)  mg/dL


 


Calcium    9.6  (8.4-10.2)  mg/dL


 


Magnesium    2.0  (1.6-2.3)  mg/dL


 


Total Bilirubin    0.3  (0.2-1.3)  mg/dL


 


AST    19  (14-36)  U/L


 


ALT    29  (9-52)  U/L


 


Alkaline Phosphatase    80  ()  U/L


 


Total Creatine Kinase  79    ()  U/L


 


CK-MB (CK-2)  0.8    (0.0-2.4)  ng/mL


 


CK-MB (CK-2) Rel Index  1.0    


 


Troponin I  <0.012    (0.000-0.034)  ng/mL


 


Total Protein    7.1  (6.3-8.2)  g/dL


 


Albumin    4.0  (3.5-5.0)  g/dL














  10/30/18 Range/Units





  14:29 


 


WBC   (3.8-10.6)  k/uL


 


RBC   (3.80-5.40)  m/uL


 


Hgb   (11.4-16.0)  gm/dL


 


Hct   (34.0-46.0)  %


 


MCV   (80.0-100.0)  fL


 


MCH   (25.0-35.0)  pg


 


MCHC   (31.0-37.0)  g/dL


 


RDW   (11.5-15.5)  %


 


Plt Count   (150-450)  k/uL


 


Neutrophils %   %


 


Lymphocytes %   %


 


Monocytes %   %


 


Eosinophils %   %


 


Basophils %   %


 


Neutrophils #   (1.3-7.7)  k/uL


 


Lymphocytes #   (1.0-4.8)  k/uL


 


Monocytes #   (0-1.0)  k/uL


 


Eosinophils #   (0-0.7)  k/uL


 


Basophils #   (0-0.2)  k/uL


 


PT  9.7  (9.0-12.0)  sec


 


INR  1.0  (<1.2)  


 


APTT  22.2  (22.0-30.0)  sec


 


Sodium   (137-145)  mmol/L


 


Potassium   (3.5-5.1)  mmol/L


 


Chloride   ()  mmol/L


 


Carbon Dioxide   (22-30)  mmol/L


 


Anion Gap   mmol/L


 


BUN   (7-17)  mg/dL


 


Creatinine   (0.52-1.04)  mg/dL


 


Est GFR (CKD-EPI)AfAm   (>60 ml/min/1.73 sqM)  


 


Est GFR (CKD-EPI)NonAf   (>60 ml/min/1.73 sqM)  


 


Glucose   (74-99)  mg/dL


 


Calcium   (8.4-10.2)  mg/dL


 


Magnesium   (1.6-2.3)  mg/dL


 


Total Bilirubin   (0.2-1.3)  mg/dL


 


AST   (14-36)  U/L


 


ALT   (9-52)  U/L


 


Alkaline Phosphatase   ()  U/L


 


Total Creatine Kinase   ()  U/L


 


CK-MB (CK-2)   (0.0-2.4)  ng/mL


 


CK-MB (CK-2) Rel Index   


 


Troponin I   (0.000-0.034)  ng/mL


 


Total Protein   (6.3-8.2)  g/dL


 


Albumin   (3.5-5.0)  g/dL














Disposition


Clinical Impression: 


 Chest pain





Disposition: ADMITTED AS IP TO THIS [facility]


Referrals: 


Katy Tay MD [Primary Care Provider] - 1-2 days


Time of Disposition: 16:42

## 2018-10-30 NOTE — XR
EXAMINATION TYPE: XR chest 2V

 

DATE OF EXAM: 10/30/2018

 

COMPARISON: NONE

 

HISTORY: Chest pain

 

TECHNIQUE:  Frontal and lateral views of the chest are obtained.

 

FINDINGS:  

 

There is no focal air space opacity.

 

No evidence for pneumothorax.  No pleural effusion.

 

The cardiac silhouette size is within normal limits.

 

The osseous structures are grossly intact.

 

IMPRESSION:  

 

1.  No acute cardiopulmonary process.

## 2018-10-30 NOTE — P.HPIM
History of Present Illness


H&P Date: 10/30/18


Chief Complaint: Chest pain





Patient is a 38-year-old female with a known history of coronary artery 

dissection and bypass of the LAD with open-heart surgery in 2018, DVT 

of the left popliteal vein diagnosed in 2018-currently on oral 

anticoagulation with eliquis came to ER with complaints of chest pain mainly 

mid retrosternal, swordlike patient and is radiating to the back.Patient states 

today the pain got considerably worse approximate one hour prior to arrival.  

Patient denies any difficulty breathing or shortness of breath.  Patient denies 

any sweating episodes.  Patient denies any nausea vomiting diarrhea.  Patient 

denies any abdominal pain.  Patient denies any lightheadedness dizziness or 

near syncopal episode.  Patient states this pain feels same pain she has with 

her previous heart attacks.


Patient has been having cold like symptoms with cough and very now since 

yesterday.


Patient was initially diagnosed with LLE DVT in 2018.  Currently taking 

oral anticoagulation regularly.  Patient says that she had a left lower 

extremity duplex scan done about a week ago at [facility] which showed 

chronic DVT.  Patient is also complaining of increasing swelling of the left 

lower extremity compared to right and pain behind the knee as well.  Patient 

takes Tylenol thousand milligrams every 6 hourly at home.


Patient says that she had prior history of MI.





Chest x-ray showed no acute cardiopulmonary process


EKG showed normal sinus rhythm


Troponin 1 negative.  No other electrolyte abnormalities noted.





PCP: Dr. Tay








Review of Systems





Constitutional: Patient denies any fever or chills .  No generalized weakness 

or weight loss.  


Abdomen: Patient denied nausea vomiting and diarrhea and abdominal pain.


Cardiovascular: Patient does have sharp chest pain.  No leg swelling.  No 

palpitations..


Respiratory: Patient does have cough without sputum production.  No shortness 

of breath


Neurologic: Patient denied any numbness or tingling headache.


Musculoskeletal: Patient denies any complaints of joint swelling or deformity.


Skin: Negative


Psychiatric: Negative


Endocrine: No heat or cold intolerance.  No recent weight gain.


Genitourinary: No dysuria or hematuria.


All other 14 point ROS negative except the above





Past Medical History


Past Medical History: Coronary Artery Disease (CAD), Deep Vein Thrombosis (DVT)

, Myocardial Infarction (MI), Pulmonary Embolus (PE)


Last Myocardial Infarction Date:: 2018


History of Any Multi-Drug Resistant Organisms: MRSA


Date of last positivie culture/infection: 


MDRO Source:: abscess abdomen


Past Surgical History:  Section, Cholecystectomy, Coronary Bypass/CABG, 

Heart Catheterization


Additional Past Surgical History / Comment(s): right wrist surgery, carpal 

tunnel, MRSA S/P wound. left knee surgery


Past Anesthesia/Blood Transfusion Reactions: No Reported Reaction


Past Psychological History: No Psychological Hx Reported


Smoking Status: Current every day smoker


Past Alcohol Use History: None Reported


Past Drug Use History: None Reported





- Past Family History


  ** Mother


Family Medical History: Cancer, CVA/TIA, Diabetes Mellitus, Myocardial 

Infarction (MI), Renal Disease


Additional Family Medical History / Comment(s): uterine cancer, artificial 

valves





  ** Father


Additional Family Medical History / Comment(s): PAD





  ** Brother(s)


Family Medical History: Coronary Artery Disease (CAD), Diabetes Mellitus


Additional Family Medical History / Comment(s): 2 HEART STENTS, issue with 

heart valve





  ** Sister(s)


Additional Family Medical History / Comment(s): psych issues





  ** Daughter(s)


Family Medical History: No Reported History





  ** Son(s)


Family Medical History: No Reported History





Medications and Allergies


 Home Medications











 Medication  Instructions  Recorded  Confirmed  Type


 


Ranitidine HCl [Zantac] 150 mg PO HS 10/20/17 10/30/18 History


 


Acetaminophen Tab [Tylenol Tab] 1,000 mg PO Q6HR PRN 10/30/18 10/30/18 History


 


Apixaban [Eliquis] 5 mg PO BID 10/30/18 10/30/18 History


 


SILVER sulfADIAZINE Cream 1 applic TOPICAL DAILY PRN 10/30/18 10/30/18 History





[Silvadene 1% Cream]    











 Allergies











Allergy/AdvReac Type Severity Reaction Status Date / Time


 


adhesive Allergy  Rash/Hives Verified 10/30/18 19:37


 


albuterol Allergy  Anaphylaxis Verified 10/30/18 19:37


 


amoxicillin Allergy  Anaphylaxis Verified 10/30/18 19:37


 


ampicillin Allergy  Anaphylaxis Verified 10/30/18 19:37


 


azithromycin [From Zithromax] Allergy  Anaphylaxis Verified 10/30/18 19:37


 


erythromycin base Allergy  Anaphylaxis Verified 10/30/18 19:37


 


latex Allergy  Rash/Hives Verified 10/30/18 19:37


 


penicillin V Allergy  Anaphylaxis Verified 10/30/18 19:37














Physical Exam


Vitals: 


 Vital Signs











  Temp Pulse Pulse Pulse Resp BP BP


 


 10/30/18 19:46      18  


 


 10/30/18 19:45  97.1 F L    89  18   100/62


 


 10/30/18 16:00   92    18  125/92 


 


 10/30/18 15:25   89    18  121/88 


 


 10/30/18 14:55   97    18  140/92 


 


 10/30/18 14:37    100    


 


 10/30/18 14:20   100    18  134/97 


 


 10/30/18 13:58  98.2 F  106 H    22  120/79 














  Pulse Ox


 


 10/30/18 19:46 


 


 10/30/18 19:45  97


 


 10/30/18 16:00  100


 


 10/30/18 15:25  96


 


 10/30/18 14:55  99


 


 10/30/18 14:37 


 


 10/30/18 14:20  97


 


 10/30/18 13:58  99








 Intake and Output











 10/30/18 10/30/18 10/30/18





 06:59 14:59 22:59


 


Other:   


 


  Voiding Method   Toilet


 


  Weight  127.006 kg 














PHYSICAL EXAMINATION: 


Patient is lying in the bed comfortably, no acute distress, awake alert and 

oriented.  Morbidly obese.. 


HEENT: Normocephalic. Neck is supple. Pupils reactive. Nostrils clear. Oral 

cavity is moist. Ears reveal no drainage. 


Neck reveals no JVD, carotid bruits, or thyromegaly. 


CHEST EXAMINATION: Trachea is central. Symmetrical expansion.  Bilateral 

rhonchi baseline.  Midline scar present.  Nonlabored breathing.


CARDIAC: Normal S1, S2 with no gallops. No murmurs 


ABDOMEN: Soft. Bowel sounds normal. No organomegaly. No abdominal bruits. 


Extremities: Minimal swelling of Left lower extremity compared to right.  No 

clubbing or cyanosis


Neurologically awake, alert, oriented x3 with well-coordinated movements.  No 

focal deficits noted


Skin: No rash or skin lesions. 


Psychiatric: Coperative.  Nonsuicidal


Musculoskeletal: No joint swelling or deformity.  Normal range of motion.








Results


CBC & Chem 7: 


 10/30/18 14:29





 10/30/18 14:29


Labs: 


 Abnormal Lab Results - Last 24 Hours (Table)











  10/30/18 Range/Units





  14:29 


 


Glucose  107 H  (74-99)  mg/dL














Thrombosis Risk Factor Assmnt





- Choose All That Apply


Any of the Below Risk Factors Present?: Yes


Each Factor Represents 1 point: Obesity (BMI >25), Swollen legs (current)


Other Risk Factors: Yes


Each Risk Factor Represents 3 Points: History of DVT/PE


Other congenital or acquired thrombophilia - If yes, enter type in comment: No


Thrombosis Risk Factor Assessment Total Risk Factor Score: 5


Thrombosis Risk Factor Assessment Level: High Risk





Assessment and Plan


Assessment: 





Chest pain radiating to the back with a known history of coronary artery 

dissection.


Cough and cold-like symptoms 1 day


History of coronary artery dissection and LAD stent with CABG in 2018


History of MI


Left lower extremity DVT diagnosed in 2018


Morbid obesity BMI 49.6


Currently everyday smoker





Plan:


Patient will be continued on telemetry monitoring.  Serial EKG and troponins.  

CT angiogram of chest was ordered to rule out a dissection..  Continue the pain 

medications and breathing treatments with Xopenex.  Cardiology was consulted 

for further evaluation.  Continue to monitor closely.  Continue with home 

medications including Eliquis.


Smoking cessation was counseled.


Prognosis is guarded with multiple medical problems and comorbid conditions.


Time with Patient: Greater than 30

## 2018-10-31 VITALS
TEMPERATURE: 97.5 F | HEART RATE: 98 BPM | SYSTOLIC BLOOD PRESSURE: 125 MMHG | RESPIRATION RATE: 16 BRPM | DIASTOLIC BLOOD PRESSURE: 78 MMHG

## 2018-10-31 LAB
CK SERPL-CCNC: 54 U/L (ref 30–135)
TROPONIN I SERPL-MCNC: <0.012 NG/ML (ref 0–0.03)

## 2018-10-31 RX ADMIN — APIXABAN SCH: 5 TABLET, FILM COATED ORAL at 09:28

## 2018-10-31 RX ADMIN — IPRATROPIUM BROMIDE SCH MG: 0.5 SOLUTION RESPIRATORY (INHALATION) at 00:02

## 2018-10-31 RX ADMIN — IPRATROPIUM BROMIDE SCH: 0.5 SOLUTION RESPIRATORY (INHALATION) at 03:08

## 2018-10-31 RX ADMIN — NITROGLYCERIN SCH: 20 OINTMENT TOPICAL at 04:07

## 2018-10-31 RX ADMIN — ACETAMINOPHEN PRN MG: 500 TABLET ORAL at 03:50

## 2018-10-31 RX ADMIN — IPRATROPIUM BROMIDE SCH MG: 0.5 SOLUTION RESPIRATORY (INHALATION) at 08:17

## 2018-10-31 RX ADMIN — IPRATROPIUM BROMIDE SCH MG: 0.5 SOLUTION RESPIRATORY (INHALATION) at 11:08

## 2018-10-31 NOTE — CONS
CONSULTATION



CHIEF COMPLAINT:

Chest pain.



Janis is a 38-year-old lady with history of coronary artery disease, status post

CABG, history of DVT and recurrent pulmonary embolisms, who presented to hospital

complaining of chest pain. Patient had known coronary artery dissection and went on to

have bypass of the LAD.  She has left popliteal vein thrombosis for which she is on

oral Eliquis.  She comes in complaining of chest pain that is sharp, precordial,

radiated to her back, mild intensity going on for 2 days.  Did not have any diaphoresis

with shortness of breath.  Pain did not radiate down her arms.  EKG did not reveal

ischemic changes.  Cardiac enzymes have been negative.  Patient also has tooth pain

that she has had for more than a year.  Patient's cardiac surgery was done at [facility].  We do not have any of these records at this time. She had CTA of the

thoracic aorta that was negative.  Pulmonary arteries did not reveal any filling

defect.  EKG shows sinus rhythm with nonspecific T-wave changes. Three sets of cardiac

enzymes are negative.  LDL cholesterol is 109.  Patient also complains of cough and

runny nose and her chest discomfort seemed to get worse with coughing. Patient's chest

pain is atypical, probably musculoskeletal.



PAST MEDICAL HISTORY:

Significant for dissection of the coronary artery, status post CABG, history of

recurrent DVT with pulmonary embolism.



MEDICATIONS:

Include Eliquis 5 b.i.d., Zantac 150 mg daily, sulfadiazine, and Tylenol.



Patient is allergic to ALBUTEROL, AMOXICILLIN, ZITHROMAX, LATEX, and PENICILLIN.



FAMILY HISTORY:

Negative for premature coronary artery disease.



SOCIAL HISTORY:

Negative for current smoking, EtOH use or drug abuse.



REVIEW OF SYSTEMS:

HEENT is unremarkable.

CARDIAC:  As described above.

RESPIRATORY:  As described above.

GI:  Negative.

GENITOURINARY:  Negative.

ALLERGY/IMMUNOLOGY: Negative.

SKIN:  Negative.

MUSCULOSKELETAL:  Significant for arthritis.

PSYCHOSOCIAL:  Negative.

ENDOCRINE:  Negative.

DERMATOLOGICAL:  Negative.

CONSTITUTIONAL:  Negative.

ONCOLOGICAL:  Negative.

HEMATOLOGICAL:  Negative.



Rest of the system review is not relevant.



PHYSICAL EXAM:

Patient is comfortable at rest.  Afebrile.  Heart rate is 90 beats per minute.  Blood

pressure is 120/70, respiratory rate is 18.

There is no jugular venous distention.  Carotid upstroke is normal.  There is no bruit.

Chest exam reveals good air entry bilaterally.  Heart exam reveals first and second

heart sounds.  No gallop.  No murmur.  No rub.  Abdomen is soft, nontender.  Exam of

extremities did not reveal any edema.  Peripheral pulses are felt.



ASSESSMENT:

1. Precordial chest pain, sharp, atypical, probably noncardiac.

2. History of deep venous thrombosis with recurrent pulmonary embolism.

3. 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

    during pregnancy, status post bypass surgery.



PLAN:

From cardiac standpoint, patient is doing well.  EKG does not reveal ischemic changes.

Cardiac enzymes have been negative.  Patient is on Eliquis, which is going to be

continued.  She seems to be coming down with upper respiratory tract infection.

Patient has cough and has productive sputum.  I am going to obtain a 2D echo to assess

her LV function, review records from [facility], her bypass surgery was done in

February of this year.  I do not see the need to investigate for coronary artery

disease at this time, given the normal cardiac enzymes, atypical chest pain and normal

EKG.





MMODL / IJN: [id number] / Job#: [id number]

## 2018-10-31 NOTE — ECHOF
Referral Reason:cp



MEASUREMENTS

--------

HEIGHT: 160.0 cm

WEIGHT: 127.0 kg

BP: 119/71

RVIDd:   2.7 cm     (< 3.3)

IVSd:   1.1 cm     (0.6 - 1.1)

LVIDd:   3.8 cm     (3.9 - 5.3)

LVPWd:   1.3 cm     (0.6 - 1.1)

IVSs:   1.7 cm

LVIDs:   2.5 cm

LVPWs:   1.8 cm

LA Diam:   3.6 cm     (2.7 - 3.8)

LAESV Index (A-L):   23.10 ml/m

Ao Diam:   3.1 cm     (2.0 - 3.7)

AV Cusp:   2.0 cm     (1.5 - 2.6)

EPSS:   0.7 cm

MV E Karthik:   1.16 m/s

MV DecT:   210 ms

MV A Karthik:   1.06 m/s

MV E/A Ratio:   1.09 

RAP:   5.00 mmHg

RVSP:   29.63 mmHg

MV EF SLOPE:   108.46 mm/s     (70 - 150)

MV EXCURSION:   1.79 cm     (> 18.000)







FINDINGS

--------

Sinus rhythm.

This was a technically difficult study with suboptimal views.

The left ventricular size is normal.   There is mild concentric left ventricular hypertrophy.   Overa
ll left ventricular systolic function is low-normal with, an EF between 50 - 55 %.   Apical inferior 
LV wall motion is hypokinetic.    Apical septum LV wall motion is hypokinetic.

The right ventricle is normal in size.

Normal LA  size by volume 22+/-6 ml/m2.

The right atrium is normal in size.

3 ml of Lumason was utilized for enhancement of images.

The aortic valve was not well visualized.

The mitral valve is normal.

Mild tricuspid regurgitation present.   Right ventricular systolic pressure is normal at < 35 mmHg.

The pulmonic valve was not well visualized.

The aortic root size is normal.

Normal inferior vena cava with normal inspiratory collapse consistent with estimated right atrial pre
ssure of  5 mmHg.

There is no pericardial effusion.



CONCLUSIONS

--------

1. Sinus rhythm.

2. This was a technically difficult study with suboptimal views.

3. The left ventricular size is normal.

4. There is mild concentric left ventricular hypertrophy.

5. Overall left ventricular systolic function is low-normal with, an EF between 50 - 55 %.

6. Apical inferior LV wall motion is hypokinetic.

7. Apical septum LV wall motion is hypokinetic.

8. The right ventricle is normal in size.

9. Normal LA size by volume 22+/-6 ml/m2.

10. The right atrium is normal in size.

11. 3 ml of Lumason was utilized for enhancement of images.

12. The aortic valve was not well visualized.

13. The mitral valve is normal.

14. Mild tricuspid regurgitation present.

15. Right ventricular systolic pressure is normal at < 35 mmHg.

16. The pulmonic valve was not well visualized.

17. The aortic root size is normal.

18. Normal inferior vena cava with normal inspiratory collapse consistent with estimated right atrial
 pressure of  5 mmHg.

19. There is no pericardial effusion.





SONOGRAPHER: RICHARD Jordan

## 2018-11-01 NOTE — P.DS
Providers


Date of admission: 


10/30/18 16:49





Expected date of discharge: 10/31/18


Attending physician: 


Chloé Fernandez





Consults: 





 





10/30/18 16:44


Consult Physician Urgent 


   Consulting Provider: [facility]


   Consult Reason/Comments: Chest pain


   Do you want consulting provider notified?: Yes











Primary care physician: 


Katy Tay





[facility]: 





Discharge diagnosis


Chest pain radiating to the back with a known history of coronary artery 

dissection.  Ruled out ACS and CT chest was also negative for dissection.


Cough and cold-like symptoms 1 day


History of coronary artery dissection and LAD stent with CABG in February 2018


History of MI


Left lower extremity DVT diagnosed in August 2018


Morbid obesity BMI 49.6


Currently everyday smoker





Hospital course


Patient is a 38-year-old female with a known history of coronary artery 

dissection and bypass of the LAD with open-heart surgery in February 2018, DVT 

of the left popliteal vein diagnosed in August 2018-currently on oral 

anticoagulation with eliquis came to ER with complaints of chest pain mainly 

mid retrosternal, swordlike patient and is radiating to the back.Patient states 

today the pain got considerably worse approximate one hour prior to arrival.  

Patient denies any difficulty breathing or shortness of breath.  Patient denies 

any sweating episodes.  Patient denies any nausea vomiting diarrhea.  Patient 

denies any abdominal pain.  Patient denies any lightheadedness dizziness or 

near syncopal episode.  Patient states this pain feels same pain she has with 

her previous heart attacks.


Patient has been having cold like symptoms with cough and very now since 

yesterday.


Patient was initially diagnosed with LLE DVT in August 2018.  Currently taking 

oral anticoagulation regularly.  Patient says that she had a left lower 

extremity duplex scan done about a week ago at [facility] which showed 

chronic DVT.  Patient is also complaining of increasing swelling of the left 

lower extremity compared to right and pain behind the knee as well.  Patient 

takes Tylenol thousand milligrams every 6 hourly at home.


Patient says that she had prior history of MI.





Chest x-ray showed no acute cardiopulmonary process


EKG showed normal sinus rhythm


Troponin 3 negative.  No other electrolyte abnormalities noted.





Plan:


Patient was continued on telemetry monitoring.  Serial EKG and troponins 

negative.  CT angiogram of chest was ordered to rule out a dissection was also 

negative...  Continue the pain medications and breathing treatments with 

Xopenex.  Cardiology was consulted for further evaluation.  2-D echocardiogram 

showed normal ejection fraction.  No acute abnormalities noted..  Continued 

with home medications including Eliquis.  Patient did improve symptomatically.


Smoking cessation was counseled.  Currently denied any complaints of chest pain 

or shortness of breath.  Stable to be discharged home and follow-up as an 

outpatient.





Discharge physical examination was done.








 Vital Signs - 24 hr











  10/31/18 10/31/18 10/31/18





  04:00 07:46 08:17


 


Temperature 97.5 F L 98.3 F 


 


Pulse Rate  61 96


 


Pulse Rate [   





Bilateral   





Dorsalis Pedis]   


 


Pulse Rate [ 92  





Pulse Oximetery   





]   


 


Respiratory 18 16 





Rate   


 


Blood Pressure  119/71 


 


Blood Pressure 120/78  





[Left Arm   





Supine]   


 


O2 Sat by Pulse 95 96 





Oximetry   














  10/31/18 10/31/18 10/31/18





  08:28 11:08 11:20


 


Temperature   


 


Pulse Rate 96 86 80


 


Pulse Rate [   





Bilateral   





Dorsalis Pedis]   


 


Pulse Rate [   





Pulse Oximetery   





]   


 


Respiratory   





Rate   


 


Blood Pressure   


 


Blood Pressure   





[Left Arm   





Supine]   


 


O2 Sat by Pulse   





Oximetry   














  10/31/18 10/31/18 10/31/18





  11:35 12:42 12:47


 


Temperature 98.0 F 97.5 F L 


 


Pulse Rate 88  


 


Pulse Rate [  91 





Bilateral   





Dorsalis Pedis]   


 


Pulse Rate [  98 





Pulse Oximetery   





]   


 


Respiratory 18 16 16





Rate   


 


Blood Pressure 129/83  


 


Blood Pressure  125/78 





[Left Arm   





Supine]   


 


O2 Sat by Pulse 96  





Oximetry   











Patient Condition at Discharge: Stable





Plan - Discharge Summary


Discharge Rx Participant: No


New Discharge Prescriptions: 


Continue


   Ranitidine HCl [Zantac] 150 mg PO HS


   SILVER sulfADIAZINE Cream [Silvadene 1% Cream] 1 applic TOPICAL DAILY PRN


     PRN Reason: Rash


   Apixaban [Eliquis] 5 mg PO BID


   Acetaminophen Tab [Tylenol] 1,000 mg PO Q6HR PRN


     PRN Reason: Pain


Discharge Medication List





Ranitidine HCl [Zantac] 150 mg PO HS 10/20/17 [History]


Acetaminophen Tab [Tylenol] 1,000 mg PO Q6HR PRN 10/30/18 [History]


Apixaban [Eliquis] 5 mg PO BID 10/30/18 [History]


SILVER sulfADIAZINE Cream [Silvadene 1% Cream] 1 applic TOPICAL DAILY PRN 10/30/

18 [History]








Follow up Appointment(s)/Referral(s): 


Katy Tay MD [Primary Care Provider] - 1-2 days


Discharge Disposition: HOME SELF-CARE

## 2019-02-18 ENCOUNTER — HOSPITAL ENCOUNTER (OUTPATIENT)
Dept: HOSPITAL 47 - EC | Age: 39
Setting detail: OBSERVATION
LOS: 2 days | Discharge: HOME | End: 2019-02-20
Attending: HOSPITALIST | Admitting: HOSPITALIST
Payer: COMMERCIAL

## 2019-02-18 VITALS — BODY MASS INDEX: 50.9 KG/M2

## 2019-02-18 DIAGNOSIS — E78.5: ICD-10-CM

## 2019-02-18 DIAGNOSIS — I25.10: ICD-10-CM

## 2019-02-18 DIAGNOSIS — R23.1: ICD-10-CM

## 2019-02-18 DIAGNOSIS — Z88.1: ICD-10-CM

## 2019-02-18 DIAGNOSIS — Z86.14: ICD-10-CM

## 2019-02-18 DIAGNOSIS — Z80.49: ICD-10-CM

## 2019-02-18 DIAGNOSIS — F17.210: ICD-10-CM

## 2019-02-18 DIAGNOSIS — I25.2: ICD-10-CM

## 2019-02-18 DIAGNOSIS — Z88.8: ICD-10-CM

## 2019-02-18 DIAGNOSIS — R06.02: ICD-10-CM

## 2019-02-18 DIAGNOSIS — E66.01: ICD-10-CM

## 2019-02-18 DIAGNOSIS — R61: ICD-10-CM

## 2019-02-18 DIAGNOSIS — Z83.3: ICD-10-CM

## 2019-02-18 DIAGNOSIS — Z95.1: ICD-10-CM

## 2019-02-18 DIAGNOSIS — Z81.8: ICD-10-CM

## 2019-02-18 DIAGNOSIS — Z79.899: ICD-10-CM

## 2019-02-18 DIAGNOSIS — R07.2: ICD-10-CM

## 2019-02-18 DIAGNOSIS — Z82.3: ICD-10-CM

## 2019-02-18 DIAGNOSIS — Z91.040: ICD-10-CM

## 2019-02-18 DIAGNOSIS — Z88.0: ICD-10-CM

## 2019-02-18 DIAGNOSIS — R79.89: ICD-10-CM

## 2019-02-18 DIAGNOSIS — Z79.01: ICD-10-CM

## 2019-02-18 DIAGNOSIS — Z91.048: ICD-10-CM

## 2019-02-18 DIAGNOSIS — Z86.718: ICD-10-CM

## 2019-02-18 DIAGNOSIS — Z90.49: ICD-10-CM

## 2019-02-18 DIAGNOSIS — K43.9: Primary | ICD-10-CM

## 2019-02-18 DIAGNOSIS — Z86.711: ICD-10-CM

## 2019-02-18 DIAGNOSIS — I10: ICD-10-CM

## 2019-02-18 LAB
ALBUMIN SERPL-MCNC: 3.9 G/DL (ref 3.5–5)
ALP SERPL-CCNC: 79 U/L (ref 38–126)
ALT SERPL-CCNC: 69 U/L (ref 9–52)
ANION GAP SERPL CALC-SCNC: 7 MMOL/L
APTT BLD: 23.5 SEC (ref 22–30)
AST SERPL-CCNC: 32 U/L (ref 14–36)
BASOPHILS # BLD AUTO: 0 K/UL (ref 0–0.2)
BASOPHILS NFR BLD AUTO: 1 %
BUN SERPL-SCNC: 17 MG/DL (ref 7–17)
CALCIUM SPEC-MCNC: 9.5 MG/DL (ref 8.4–10.2)
CHLORIDE SERPL-SCNC: 106 MMOL/L (ref 98–107)
CK SERPL-CCNC: 71 U/L (ref 30–135)
CO2 SERPL-SCNC: 26 MMOL/L (ref 22–30)
EOSINOPHIL # BLD AUTO: 0.2 K/UL (ref 0–0.7)
EOSINOPHIL NFR BLD AUTO: 2 %
ERYTHROCYTE [DISTWIDTH] IN BLOOD BY AUTOMATED COUNT: 4.54 M/UL (ref 3.8–5.4)
ERYTHROCYTE [DISTWIDTH] IN BLOOD: 14.2 % (ref 11.5–15.5)
GLUCOSE SERPL-MCNC: 93 MG/DL (ref 74–99)
HCT VFR BLD AUTO: 39.8 % (ref 34–46)
HGB BLD-MCNC: 13.3 GM/DL (ref 11.4–16)
INR PPP: 0.9 (ref ?–1.2)
LYMPHOCYTES # SPEC AUTO: 2.9 K/UL (ref 1–4.8)
LYMPHOCYTES NFR SPEC AUTO: 33 %
MAGNESIUM SPEC-SCNC: 1.8 MG/DL (ref 1.6–2.3)
MCH RBC QN AUTO: 29.2 PG (ref 25–35)
MCHC RBC AUTO-ENTMCNC: 33.4 G/DL (ref 31–37)
MCV RBC AUTO: 87.5 FL (ref 80–100)
MONOCYTES # BLD AUTO: 0.4 K/UL (ref 0–1)
MONOCYTES NFR BLD AUTO: 4 %
NEUTROPHILS # BLD AUTO: 5.2 K/UL (ref 1.3–7.7)
NEUTROPHILS NFR BLD AUTO: 59 %
PLATELET # BLD AUTO: 278 K/UL (ref 150–450)
POTASSIUM SERPL-SCNC: 4.2 MMOL/L (ref 3.5–5.1)
PROT SERPL-MCNC: 6.6 G/DL (ref 6.3–8.2)
PT BLD: 9.9 SEC (ref 9–12)
SODIUM SERPL-SCNC: 139 MMOL/L (ref 137–145)
TROPONIN I SERPL-MCNC: <0.012 NG/ML (ref 0–0.03)
WBC # BLD AUTO: 8.8 K/UL (ref 3.8–10.6)

## 2019-02-18 PROCEDURE — 85610 PROTHROMBIN TIME: CPT

## 2019-02-18 PROCEDURE — 82550 ASSAY OF CK (CPK): CPT

## 2019-02-18 PROCEDURE — 83735 ASSAY OF MAGNESIUM: CPT

## 2019-02-18 PROCEDURE — 84703 CHORIONIC GONADOTROPIN ASSAY: CPT

## 2019-02-18 PROCEDURE — 85025 COMPLETE CBC W/AUTO DIFF WBC: CPT

## 2019-02-18 PROCEDURE — 96361 HYDRATE IV INFUSION ADD-ON: CPT

## 2019-02-18 PROCEDURE — 71046 X-RAY EXAM CHEST 2 VIEWS: CPT

## 2019-02-18 PROCEDURE — 93005 ELECTROCARDIOGRAM TRACING: CPT

## 2019-02-18 PROCEDURE — 84484 ASSAY OF TROPONIN QUANT: CPT

## 2019-02-18 PROCEDURE — 99285 EMERGENCY DEPT VISIT HI MDM: CPT

## 2019-02-18 PROCEDURE — 85730 THROMBOPLASTIN TIME PARTIAL: CPT

## 2019-02-18 PROCEDURE — 96375 TX/PRO/DX INJ NEW DRUG ADDON: CPT

## 2019-02-18 PROCEDURE — 93306 TTE W/DOPPLER COMPLETE: CPT

## 2019-02-18 PROCEDURE — 82553 CREATINE MB FRACTION: CPT

## 2019-02-18 PROCEDURE — 80053 COMPREHEN METABOLIC PANEL: CPT

## 2019-02-18 PROCEDURE — 71250 CT THORAX DX C-: CPT

## 2019-02-18 PROCEDURE — 96374 THER/PROPH/DIAG INJ IV PUSH: CPT

## 2019-02-18 PROCEDURE — 96376 TX/PRO/DX INJ SAME DRUG ADON: CPT

## 2019-02-18 PROCEDURE — 36415 COLL VENOUS BLD VENIPUNCTURE: CPT

## 2019-02-18 PROCEDURE — 94760 N-INVAS EAR/PLS OXIMETRY 1: CPT

## 2019-02-18 PROCEDURE — 80061 LIPID PANEL: CPT

## 2019-02-18 RX ADMIN — HYDROMORPHONE HYDROCHLORIDE PRN MG: 1 INJECTION, SOLUTION INTRAMUSCULAR; INTRAVENOUS; SUBCUTANEOUS at 23:42

## 2019-02-18 RX ADMIN — ONDANSETRON PRN MG: 2 INJECTION INTRAMUSCULAR; INTRAVENOUS at 23:37

## 2019-02-18 NOTE — XR
EXAMINATION TYPE: XR chest 2V

 

DATE OF EXAM: 2/18/2019

 

COMPARISON: 10/30/2018

 

HISTORY: Chest pain

 

TECHNIQUE:  Frontal and lateral views of the chest are obtained.

 

FINDINGS:  Heart is normal. Lungs are clear of consolidation. There are sternal wires. Costophrenic a
ngles are clear. Bony thorax is intact.

 

IMPRESSION:  No active cardiopulmonary disease. Normal heart. No change.

## 2019-02-18 NOTE — US
INDICATION: Left leg swelling. Chest pain left leg edema.

 

TECHNIQUE:  Real-time imaging of the left common femoral, superficial 

femoral and popliteal veins is performed utilizing intermittent 

compression.  The study is supplemented with color flow imaging and 

duplex Doppler during spontaneous flow and calf augmentation.  

 

COMPARISON: None

 

FINDINGS:  Normal compressibility is demonstrated from the common femoral 

vein to the popliteal vein.  There is normal response to augmentation.  

Normal spontaneous phasic flow is noted.

 

IMPRESSION:  No evidence of deep venous thrombosis in the left lower 

extremity.

## 2019-02-18 NOTE — ED
Chest Pain HPI





- General


Chief Complaint: Chest Pain


Stated Complaint: chest pain, sob


Time Seen by Provider: 19 19:11


Source: patient


Mode of arrival: wheelchair


Limitations: no limitations





- History of Present Illness


Initial Comments: 


38 year old female patient with past medical history significant for myocardial 

infarction with one-vessel CABG presents to the emergency department today for 

evaluation of substernal and left-sided chest pain.  Patient states it is sharp 

stabbing pain that radiates through to her back.  Patient states she has been 

short of breath, nausea, and sweaty with this.  Patient states the pain started 

last evening and has persisted throughout the day since he states it is 

progressively worsening.  She denies any dizziness or weakness.  She does admit 

to smoking cigarettes.  She sees Dr. Jc for outpatient cardiology. Patient 

denies any recent rash, fever, chills, abdominal pain, diarrhea, constipation, 

back pain, numbness, tingling, hematuria, dysuria, urinary urgency, urinary 

frequency, headache, visual changes, or any other complaints.








- Related Data


 Home Medications











 Medication  Instructions  Recorded  Confirmed


 


Ranitidine HCl [Zantac] 150 mg PO BID 10/20/17 02/18/19


 


Apixaban [Eliquis] 5 mg PO BID 10/30/18 02/18/19


 


Metoprolol Tartrate 25 mg PO QAM 19


 


Rosuvastatin Calcium [Crestor] 5 mg PO HS 19


 


Varenicline Tartrate [Chantix 0.5 mg PO AS DIRECTED 19





Starter Pack]   











 Allergies











Allergy/AdvReac Type Severity Reaction Status Date / Time


 


adhesive Allergy  Rash/Hives Verified 19 19:54


 


albuterol Allergy  Anaphylaxis Verified 19 19:54


 


amoxicillin Allergy  Anaphylaxis Verified 19 19:54


 


ampicillin Allergy  Anaphylaxis Verified 19 19:54


 


azithromycin [From Zithromax] Allergy  Anaphylaxis Verified 19 19:54


 


erythromycin base Allergy  Anaphylaxis Verified 19 19:54


 


latex Allergy  Rash/Hives Verified 19 19:54


 


penicillin V Allergy  Anaphylaxis Verified 19 19:54














Review of Systems


ROS Statement: 


Those systems with pertinent positive or pertinent negative responses have been 

documented in the HPI.





ROS Other: All systems not noted in ROS Statement are negative.





EKG Findings





- EKG Comments:


EKG Findings:: EKG obtained at 1850 shows normal sinus rhythm with a 

ventricular rate of 91, NJ interval 158, QR restoration 80, , QTc 437.  

No evidence of ST elevation or depression.





Past Medical History


Past Medical History: Coronary Artery Disease (CAD), Deep Vein Thrombosis (DVT)

, Myocardial Infarction (MI), Pulmonary Embolus (PE)


Last Myocardial Infarction Date:: 2018


History of Any Multi-Drug Resistant Organisms: MRSA


Date of last positivie culture/infection: 


MDRO Source:: abscess abdomen


Past Surgical History:  Section, Cholecystectomy, Coronary Bypass/CABG, 

Heart Catheterization


Additional Past Surgical History / Comment(s): right wrist surgery, carpal 

tunnel, MRSA S/P wound. left knee surgery


Past Anesthesia/Blood Transfusion Reactions: No Reported Reaction


Past Psychological History: No Psychological Hx Reported


Smoking Status: Current every day smoker


Past Alcohol Use History: None Reported


Past Drug Use History: None Reported





- Past Family History


  ** Mother


Family Medical History: Cancer, CVA/TIA, Diabetes Mellitus, Myocardial 

Infarction (MI), Renal Disease


Additional Family Medical History / Comment(s): uterine cancer, artificial 

valves





  ** Father


Additional Family Medical History / Comment(s): PAD





  ** Brother(s)


Family Medical History: Coronary Artery Disease (CAD), Diabetes Mellitus


Additional Family Medical History / Comment(s): 2 HEART STENTS, issue with 

heart valve





  ** Sister(s)


Additional Family Medical History / Comment(s): psych issues





  ** Daughter(s)


Family Medical History: No Reported History





  ** Son(s)


Family Medical History: No Reported History





General Exam


Limitations: no limitations


General appearance: alert, in no apparent distress, other (Physical well-

developed, well-nourished adult female patient in no acute distress.  Vital 

signs upon presentation are temperature 97.6F, pulse 93, respirations 18, 

blood pressure 115/80, pulse ox 97% on room air.)


Eye exam: Present: normal appearance, PERRL, EOMI.  Absent: scleral icterus, 

conjunctival injection, periorbital swelling


ENT exam: Present: normal exam, normal oropharynx, mucous membranes moist


Respiratory exam: Present: normal lung sounds bilaterally.  Absent: respiratory 

distress, wheezes, rales, rhonchi, stridor


Cardiovascular Exam: Present: regular rate, normal rhythm, normal heart sounds.

  Absent: systolic murmur, diastolic murmur, rubs, gallop, clicks


GI/Abdominal exam: Present: soft, normal bowel sounds.  Absent: distended, 

tenderness, guarding, rebound, rigid


Neurological exam: Present: alert, oriented X3, CN II-XII intact


Psychiatric exam: Present: normal affect, normal mood


Skin exam: Present: warm, dry, intact, normal color.  Absent: rash





Course


 Vital Signs











  19





  18:39 21:01


 


Temperature 97.6 F 


 


Pulse Rate 93 84


 


Respiratory 18 18





Rate  


 


Blood Pressure 115/80 105/57


 


O2 Sat by Pulse 97 95





Oximetry  














Chest Pain MDM





- [facility]


RADIOLOGY: Two-view x-ray of the chest is obtained.  Report was reviewed in its 

entirety.  Impression by Dr. Ruelas shows no active cardiopulmonary disease.

  Normal heart.  No change.





MDM: 38-year-old female patient with a past medical history significant for 

CABG and myocardial infarction with the last MI being 2018 presents to the 

emergency department today for evaluation of substernal left-sided chest pain 

that radiates through to her back.  Patient did have associated nausea, 

shortness of breath, and sweats with this.  Labs reviewed and are relatively 

unremarkable.  Initial troponin negative.  Chest x-ray showed no acute 

cardiopulmonary process.  Given risk factors, past history is felt the patient 

benefit from admission for serial troponins and evaluation by cardiology 

tomorrow.  Did discuss findings, results, plan with the patient, she is 

agreeable.








Disposition


Clinical Impression: 


 Chest pain





Disposition: ADMITTED AS IP TO THIS [facility]


Condition: Serious


Referrals: 


Katy Tay MD [Primary Care Provider] - 1-2 days


Decision to Admit Reason: Admit from EC


Decision Date: 19


Decision Time: 21:39

## 2019-02-19 LAB
CHOLEST SERPL-MCNC: 132 MG/DL (ref ?–200)
CK SERPL-CCNC: 61 U/L (ref 30–135)
CK SERPL-CCNC: 63 U/L (ref 30–135)
HDLC SERPL-MCNC: 33 MG/DL (ref 40–60)
LDLC SERPL CALC-MCNC: 63 MG/DL (ref 0–99)
TRIGL SERPL-MCNC: 179 MG/DL (ref ?–150)
TROPONIN I SERPL-MCNC: <0.012 NG/ML (ref 0–0.03)
TROPONIN I SERPL-MCNC: <0.012 NG/ML (ref 0–0.03)

## 2019-02-19 RX ADMIN — FAMOTIDINE SCH MG: 20 TABLET, FILM COATED ORAL at 20:02

## 2019-02-19 RX ADMIN — METOPROLOL TARTRATE SCH MG: 25 TABLET, FILM COATED ORAL at 09:15

## 2019-02-19 RX ADMIN — DIPHENHYDRAMINE HYDROCHLORIDE PRN MG: 50 INJECTION, SOLUTION INTRAMUSCULAR; INTRAVENOUS at 00:55

## 2019-02-19 RX ADMIN — ISOSORBIDE MONONITRATE SCH MG: 30 TABLET, EXTENDED RELEASE ORAL at 17:30

## 2019-02-19 RX ADMIN — FAMOTIDINE SCH MG: 20 TABLET, FILM COATED ORAL at 09:15

## 2019-02-19 RX ADMIN — HYDROMORPHONE HYDROCHLORIDE PRN MG: 1 INJECTION, SOLUTION INTRAMUSCULAR; INTRAVENOUS; SUBCUTANEOUS at 20:02

## 2019-02-19 RX ADMIN — ONDANSETRON PRN MG: 2 INJECTION INTRAMUSCULAR; INTRAVENOUS at 10:28

## 2019-02-19 RX ADMIN — DIPHENHYDRAMINE HYDROCHLORIDE PRN MG: 50 INJECTION, SOLUTION INTRAMUSCULAR; INTRAVENOUS at 10:52

## 2019-02-19 RX ADMIN — ONDANSETRON PRN MG: 2 INJECTION INTRAMUSCULAR; INTRAVENOUS at 20:03

## 2019-02-19 RX ADMIN — HYDROMORPHONE HYDROCHLORIDE PRN MG: 1 INJECTION, SOLUTION INTRAMUSCULAR; INTRAVENOUS; SUBCUTANEOUS at 10:24

## 2019-02-19 RX ADMIN — ATORVASTATIN CALCIUM SCH MG: 10 TABLET, FILM COATED ORAL at 20:02

## 2019-02-19 RX ADMIN — APIXABAN SCH MG: 5 TABLET, FILM COATED ORAL at 09:15

## 2019-02-19 RX ADMIN — APIXABAN SCH: 5 TABLET, FILM COATED ORAL at 19:42

## 2019-02-19 RX ADMIN — ATORVASTATIN CALCIUM SCH MG: 10 TABLET, FILM COATED ORAL at 00:12

## 2019-02-19 RX ADMIN — ASPIRIN 325 MG ORAL TABLET SCH MG: 325 PILL ORAL at 09:15

## 2019-02-19 RX ADMIN — FAMOTIDINE SCH MG: 20 TABLET, FILM COATED ORAL at 00:11

## 2019-02-19 NOTE — P.CRDCN
History of Present Illness


History of present illness: 


This is a pleasant 38 year old  female past medical history 

significant for coronary artery disease status post bypass grafting.  She 

initially underwent a cardiac catheterization at [facility] 

which revealed a lesion in the mid LAD with evidence of intrinsic dissection.  

Subsequently thereafter she went back to the hospital with a significant rise 

in her troponin and had a total occlusion of the LAD requiring bypass grafting 

with a LIMA to the LAD.  This took place in 2018.  Post procedure she 

had 2 episodes of DVT and PE with right ventricular enlargement and is 

currently on long-term anticoagulation due to that.  Most recent echocardiogram 

revealed normal LV size and function with no evidence of right ventricular 

enlargement.  She follows in the office with Dr. Jc.  We have been asked to 

see her in consultation for symptoms of chest discomfort.  She describes a 

sharp pain in the midsternal region with radiation to the left precordial 

region.  No radiation to the arm, back, neck or jaw.  This first started on 

 night while at home resting.  She has no specific aggravating or 

alleviating factors.  She had mild nausea and would have episodes of cool 

clammy feeling throughout her body.  Her symptoms of chest discomfort seemed to 

worsen last night and became much worse with deep inspiration or movement of 

her torso.  She denies associated dizziness, palpitations, vomiting, fever/

chills or cough.  She has been receiving Dilaudid IV since admission to the 

hospital and her chest pain has resolved.





EKG reveals sinus mechanism with T-wave inversion in anterior lead with poor R-

wave progression. No change from previous EKG. 


Chest xray is negative for an acute cardiopulmonary process. 


Left lower extremity Doppler negative for DVT.


Laboratory data reviewed, WBC 8.8, hemoglobin 13.3, platelets 278, sodium 139, 

potassium 4.2, creatinine 0.73, magnesium 1.8, cardiac enzymes negative 3, LDL 

63 and HDL 33.


Current cardiac medications include Eliquis 5 mg twice a day, Lopressor 25 mg 

daily and rosuvastatin 5 mg daily.


Most recent stress test performed in the office 2018 was an exercise 

stress test that was negative for stress-induced cardiac ischemia with poor 

exercise tolerance.





At the time of my exam:


CONSTITUTIONAL: Denies fever. Denies chills.


EYES: Denies blurred vision. Denies vision changes. Denies eye pain.


EARS, NOSE, MOUTH & THROAT: Denies headache. Denies sore throat. Denies ear 

pain.


CARDIOVASCULAR: Complains of pleuritic chest pain. Denies shortness of breath. 

Denies orthopnea. Denies PND. Denies palpitations.


RESPIRATORY: Denies cough. 


GASTROINTESTINAL: Denies abdominal pain. Denies diarrhea. Denies constipation. 

Denies nausea. Denies vomiting.


MUSCULOSKELETAL: Denies myalgias.


INTEGUMENTARY: Denies pruitis. Denies rash.


NEUROLOGIC: Denies numbness. Denies tingling. Denies weakness.


PSYCHIATRIC: Denies anxiety. Denies depression.


ENDOCRINE: Denies fatigue. Denies weight change. Denies polydipsia. Denies 

polyurina.


GENITOURINARY: Denies burning, hematuria or urgency with micturation.


HEMATOLOGIC: Denies history of anemia. Denies bleeding. 





Blood pressure 106/69 heart rate 74 afebrile maintaining oxygen saturation on 

room air


GENERAL: This is a 38-year-old  female in no apparent distress at the 

time of my examination.  Morbidly obese.


HEENT: Head is atraumatic, normocephalic. Pupils are equal, round. Sclerae 

anicteric. Conjunctivae are clear. Mucous membranes of the mouth are moist. 

Neck is supple. There is no jugular venous distention. No carotid bruit is 

heard.


LUNGS: Clear to auscultation no wheezes, rales or rhonchi. No chest wall 

tenderness is noted on palpation or with deep breathing.


HEART: Regular rate and rhythm without murmurs, rubs or gallops. S1 and S2 

heard.


ABDOMEN: Soft, nontender. Bowel sounds are heard. No organomegaly noted.


EXTREMITIES: No evidence of peripheral edema and no calf tenderness noted.


VASCULAR: Radial and dorsalis pedis pulses palpated, no evidence of clubbing.  


NEUROLOGIC: Patient is awake, alert and oriented x3.


 


ASSESSMENT


Chest pain, atypical for angina.  Acute event has been ruled out. Mostly 

musculoskeletal in nature with pleurtic component. 


Dyslipidemia


Hypertension


History of coronary artery disease status post single-vessel bypass grafting


Morbid obesity, BMI 52.4





PLAN


An acute coronary event has been ruled out.


Recent stress test in the office shows no evidence of stress-induced ischemia.


Obtain 2-D echocardiogram and Doppler study to assess cardiac structure and 

function.


Add a small dose of Imdur 30 mg daily to her daily regimen.


Increase activity and ambulation in the halls.


Lifestyle modifications recommended for weight loss. 


Stable from a cardiac perspective, follow-up in the office with Dr. Jc in 2 

weeks.


Thank you kindly for this consultation.











Nurse Practitioner note has been reviewed, I agree with a documented findings 

and plan of care.  Patient was seen and examined.








Past Medical History


Past Medical History: Coronary Artery Disease (CAD), Deep Vein Thrombosis (DVT)

, Myocardial Infarction (MI), Pulmonary Embolus (PE)


Last Myocardial Infarction Date:: 2018


History of Any Multi-Drug Resistant Organisms: MRSA


Date of last positivie culture/infection: 


MDRO Source:: abscess abdomen


Past Surgical History:  Section, Cholecystectomy, Coronary Bypass/CABG, 

Heart Catheterization


Additional Past Surgical History / Comment(s): right wrist surgery, carpal 

tunnel, MRSA S/P wound. left knee surgery


Past Anesthesia/Blood Transfusion Reactions: No Reported Reaction


Past Psychological History: No Psychological Hx Reported


Smoking Status: Current every day smoker


Past Alcohol Use History: None Reported


Past Drug Use History: None Reported





- Past Family History


  ** Mother


Family Medical History: Cancer, CVA/TIA, Diabetes Mellitus, Myocardial 

Infarction (MI), Renal Disease


Additional Family Medical History / Comment(s): uterine cancer, artificial 

valves





  ** Father


Additional Family Medical History / Comment(s): PAD





  ** Brother(s)


Family Medical History: Coronary Artery Disease (CAD), Diabetes Mellitus


Additional Family Medical History / Comment(s): 2 HEART STENTS, issue with 

heart valve





  ** Sister(s)


Additional Family Medical History / Comment(s): psych issues





  ** Daughter(s)


Family Medical History: No Reported History





  ** Son(s)


Family Medical History: No Reported History





Medications and Allergies


 Home Medications











 Medication  Instructions  Recorded  Confirmed  Type


 


Ranitidine HCl [Zantac] 150 mg PO BID 10/20/17 02/18/19 History


 


Apixaban [Eliquis] 5 mg PO BID 10/30/18 02/18/19 History


 


Metoprolol Tartrate 25 mg PO QAM 19 History


 


Rosuvastatin Calcium [Crestor] 5 mg PO HS 19 History


 


Varenicline Tartrate [Chantix 0.5 mg PO AS DIRECTED 19 History





Starter Pack]    











 Allergies











Allergy/AdvReac Type Severity Reaction Status Date / Time


 


adhesive Allergy  Rash/Hives Verified 19 19:54


 


albuterol Allergy  Anaphylaxis Verified 19 19:54


 


amoxicillin Allergy  Anaphylaxis Verified 19 19:54


 


ampicillin Allergy  Anaphylaxis Verified 19 19:54


 


azithromycin [From Zithromax] Allergy  Anaphylaxis Verified 19 19:54


 


erythromycin base Allergy  Anaphylaxis Verified 19 19:54


 


latex Allergy  Rash/Hives Verified 19 19:54


 


penicillin V Allergy  Anaphylaxis Verified 19 19:54














Physical Exam


Vitals: 


 Vital Signs











  Temp Pulse Pulse Pulse Resp BP BP


 


 19 07:00  98.2 F    74  18   106/69


 


 19 04:00    83   18  


 


 19 03:53  97.9 F   83   18   119/72


 


 19 00:00    75   16  


 


 19 23:37  97.5 F L   75   16   135/78


 


 19 22:54  98.2 F  72    16  122/78 


 


 19 22:33  98.0 F  76    18  129/79 


 


 19 21:01   84    18  105/57 


 


 19 19:00    71    


 


 19 18:39  97.6 F  93    18  115/80 














  Pulse Ox


 


 19 07:00  95


 


 19 04:00 


 


 19 03:53  93 L


 


 19 00:00 


 


 19 23:37  99


 


 19 22:54  98


 


 19 22:33  98


 


 19 21:01  95


 


 19 19:00 


 


 19 18:39  97








 Intake and Output











 19





 22:59 06:59 14:59


 


Other:   


 


  Weight 134.263 kg  














Results





 19 18:54





 19 18:54


 Cardiac Enzymes











  19 Range/Units





  18:54 18:54 00:30 


 


AST   32   (14-36)  U/L


 


CK-MB (CK-2)  0.5   0.5  (0.0-2.4)  ng/mL


 


Troponin I  <0.012   <0.012  (0.000-0.034)  ng/mL














  19 Range/Units





  06:29 


 


AST   (14-36)  U/L


 


CK-MB (CK-2)  0.5  (0.0-2.4)  ng/mL


 


Troponin I  <0.012  (0.000-0.034)  ng/mL








 Coagulation











  19 Range/Units





  18:54 


 


PT  9.9  (9.0-12.0)  sec


 


APTT  23.5  (22.0-30.0)  sec








 Lipids











  19 Range/Units





  06:29 


 


Triglycerides  179 H  (<150)  mg/dL


 


Cholesterol  132  (<200)  mg/dL


 


HDL Cholesterol  33 L  (40-60)  mg/dL








 CBC











  19 Range/Units





  18:54 


 


WBC  8.8  (3.8-10.6)  k/uL


 


RBC  4.54  (3.80-5.40)  m/uL


 


Hgb  13.3  (11.4-16.0)  gm/dL


 


Hct  39.8  (34.0-46.0)  %


 


Plt Count  278  (150-450)  k/uL








 Comprehensive Metabolic Panel











  19 Range/Units





  18:54 


 


Sodium  139  (137-145)  mmol/L


 


Potassium  4.2  (3.5-5.1)  mmol/L


 


Chloride  106  ()  mmol/L


 


Carbon Dioxide  26  (22-30)  mmol/L


 


BUN  17  (7-17)  mg/dL


 


Creatinine  0.73  (0.52-1.04)  mg/dL


 


Glucose  93  (74-99)  mg/dL


 


Calcium  9.5  (8.4-10.2)  mg/dL


 


AST  32  (14-36)  U/L


 


ALT  69 H  (9-52)  U/L


 


Alkaline Phosphatase  79  ()  U/L


 


Total Protein  6.6  (6.3-8.2)  g/dL


 


Albumin  3.9  (3.5-5.0)  g/dL








 Current Medications











Generic Name Dose Route Start Last Admin





  Trade Name Freq  PRN Reason Stop Dose Admin


 


Apixaban  5 mg  19 09:00  





  Eliquis  PO   





  BID [facility]   





     





     





     





     


 


Aspirin  325 mg  19 09:00  





  Aspirin  PO   





  DAILY [facility]   





     





     





     





     


 


Atorvastatin Calcium  10 mg  19 23:35  19 00:12





  Lipitor  PO   10 mg





  HS [facility]   Administration





     





     





     





     


 


Diphenhydramine HCl  25 mg  19 00:13  02/19/19 00:55





  Benadryl  IVP   25 mg





  Q6HR PRN   Administration





  Allergy Symptoms   





     





     





     


 


Famotidine  20 mg  19 23:45  19 00:11





  Pepcid  PO   20 mg





  BID CHON   Administration





     





     





     





     


 


Hydromorphone HCl  1 mg  19 21:35  19 23:42





  Dilaudid  IVP   1 mg





  Q4HR PRN   Administration





  Pain   





     





     





     


 


Metoprolol Tartrate  25 mg  19 09:00  





  Lopressor  PO   





  QAM CHON   





     





     





     





     


 


Nitroglycerin  0.4 mg  19 21:33  





  Nitrostat  SUBLINGUAL   





  Q5M PRN   





  Chest Pain   





     





     





     


 


Ondansetron HCl  4 mg  19 21:35  19 23:37





  Zofran  IVP   4 mg





  Q6HR PRN   Administration





  Nausea And Vomiting   





     





     





     








 Intake and Output











 19





 22:59 06:59 14:59


 


Other:   


 


  Weight 134.263 kg  








 





 19 18:54 





 19 18:54

## 2019-02-19 NOTE — P.HPIM
History of Present Illness





This is a pleasant 38 years old female with past medical history of coronary 

artery disease, DVT, myocardial infarction, pulmonary embolism on Eliquis.  

History of CABG last year.  He presents because of chest pain, in the 

epigastrium, Lipitor radiating below left breast about 1-2 days in duration, 

about 8/10 in severity, and down to 3/10 in severity, associated with some 

nausea and little dyspnea, stabbing in character.  On exam she has epigastric 

tenderness


Vitas looks stable, CBC, BMP and INR were unremarkable and within normal 

limits.  ALT is slightly elevated , rest of liver function within  normal 

limits.  Chest x-ray is negative for acute process.  Cardiology evaluated the 

patient, acute coronary event has been ruled out.  And the recommended amount 

Imdur to her regimen and they cleared patient for discharge from their 

perspective.  She had CT of the colon showing subxiphoid midline and muscular 

wall defect with herniating epigastric fat.  About 1.6 cm in width.  Cardiac 

service also discussed the case with vascular surgery, who also recommended no 

need for vascular surgery intervention.














Review of Systems





CONSTITUTIONAL: No fever, no malaise, no fatigue. 


HEENT: No recent visual problems or hearing problems. Denied any sore throat. 


CARDIOVASCULAR: No  orthopnea, PND, no palpitations, no syncope. 


PULMONARY: No shortness of breath, no cough, no hemoptysis. 


GASTROINTESTINAL: No diarrhea, no nausea, no vomiting, no abdominal pain. 

Normoactive bowel sounds. 


NEUROLOGICAL: No headaches, no weakness, no numbness. 


HEMATOLOGICAL: Denies any bleeding or petechiae. 


GENITOURINARY: Denies any burning micturition, frequency, or urgency. 


MUSCULOSKELETAL/RHEUMATOLOGICAL: Denies any joint pain, swelling, or any muscle 

pain. 


ENDOCRINE: Denies any polyuria or polydipsia.











Past Medical History


Past Medical History: Coronary Artery Disease (CAD), Deep Vein Thrombosis (DVT)

, Myocardial Infarction (MI), Pulmonary Embolus (PE)


Last Myocardial Infarction Date:: 2018


History of Any Multi-Drug Resistant Organisms: MRSA


Date of last positivie culture/infection: 


MDRO Source:: abscess abdomen


Past Surgical History:  Section, Cholecystectomy, Coronary Bypass/CABG, 

Heart Catheterization


Additional Past Surgical History / Comment(s): right wrist surgery, carpal 

tunnel, MRSA S/P wound. left knee surgery


Past Anesthesia/Blood Transfusion Reactions: No Reported Reaction


Past Psychological History: No Psychological Hx Reported


Smoking Status: Current every day smoker


Past Alcohol Use History: None Reported


Past Drug Use History: None Reported





- Past Family History


  ** Mother


Family Medical History: Cancer, CVA/TIA, Diabetes Mellitus, Myocardial 

Infarction (MI), Renal Disease


Additional Family Medical History / Comment(s): uterine cancer, artificial 

valves





  ** Father


Additional Family Medical History / Comment(s): PAD





  ** Brother(s)


Family Medical History: Coronary Artery Disease (CAD), Diabetes Mellitus


Additional Family Medical History / Comment(s): 2 HEART STENTS, issue with 

heart valve





  ** Sister(s)


Additional Family Medical History / Comment(s): psych issues





  ** Daughter(s)


Family Medical History: No Reported History





  ** Son(s)


Family Medical History: No Reported History





Medications and Allergies


 Home Medications











 Medication  Instructions  Recorded  Confirmed  Type


 


Ranitidine HCl [Zantac] 150 mg PO BID 10/20/17 02/18/19 History


 


Apixaban [Eliquis] 5 mg PO BID 10/30/18 02/18/19 History


 


Metoprolol Tartrate 25 mg PO QAM 19 History


 


Rosuvastatin Calcium [Crestor] 5 mg PO HS 19 History


 


Varenicline Tartrate [Chantix 0.5 mg PO AS DIRECTED 19 History





Starter Pack]    











 Allergies











Allergy/AdvReac Type Severity Reaction Status Date / Time


 


adhesive Allergy  Rash/Hives Verified 19 19:54


 


albuterol Allergy  Anaphylaxis Verified 19 19:54


 


amoxicillin Allergy  Anaphylaxis Verified 19 19:54


 


ampicillin Allergy  Anaphylaxis Verified 19 19:54


 


azithromycin [From Zithromax] Allergy  Anaphylaxis Verified 19 19:54


 


erythromycin base Allergy  Anaphylaxis Verified 19 19:54


 


latex Allergy  Rash/Hives Verified 19 19:54


 


penicillin V Allergy  Anaphylaxis Verified 19 19:54














Physical Exam


Vitals: 


 Vital Signs











  Temp Pulse Pulse Pulse Resp BP BP


 


 19 15:40  98.3 F    63  18   102/66


 


 19 12:00  98.0 F    67  18   106/67


 


 19 08:28       


 


 19 08:23      18  


 


 19 08:18       


 


 19 07:00  98.2 F    74  18   106/69


 


 19 04:00    83   18  


 


 19 03:53  97.9 F   83   18   119/72


 


 19 00:00    75   16  


 


 19 23:37  97.5 F L   75   16   135/78


 


 19 22:54  98.2 F  72    16  122/78 


 


 19 22:33  98.0 F  76    18  129/79 


 


 19 21:01   84    18  105/57 


 


 19 19:00    71    


 


 19 18:39  97.6 F  93    18  115/80 














  Pulse Ox


 


 19 15:40  95


 


 19 12:00  93 L


 


 19 08:28  92 L


 


 19 08:23 


 


 19 08:18  92 L


 


 19 07:00  95


 


 19 04:00 


 


 19 03:53  93 L


 


 19 00:00 


 


 19 23:37  99


 


 19 22:54  98


 


 19 22:33  98


 


 19 21:01  95


 


 19 19:00 


 


 19 18:39  97








 Intake and Output











 19





 06:59 14:59 22:59


 


Intake Total  240 600


 


Balance  240 600


 


Intake:   


 


  Oral  240 


 


  Other   600


 


Other:   


 


  # Voids  1 














GENERAL: The patient is alert and oriented x3, not in any acute distress. Well 

developed, well nourished. 


HEENT: Pupils are round and equally reacting to light. EOMI. No scleral 

icterus. No conjunctival pallor. Normocephalic, atraumatic. No pharyngeal 

erythema. No thyromegaly. 


CARDIOVASCULAR: S1 and S2 present. No murmurs, rubs, or gallops. 


PULMONARY: Chest is clear to auscultation, no wheezing or crackles. 


-ABDOMEN: Soft, epigastric tenderness,  no rebound tenderness or guarding, 

nondistended, normoactive bowel sounds. No palpable organomegaly. 


MUSCULOSKELETAL: No joint swelling or deformity. 


EXTREMITIES: No cyanosis, clubbing, or pedal edema. 


NEUROLOGICAL: Gross neurological examination did not reveal any focal deficits. 


SKIN: No rashes.











Results


CBC & Chem 7: 


 19 18:54





 19 18:54


Labs: 


 Abnormal Lab Results - Last 24 Hours (Table)











  19 Range/Units





  18:54 06:29 


 


ALT  69 H   (9-52)  U/L


 


Triglycerides   179 H  (<150)  mg/dL


 


HDL Cholesterol   33 L  (40-60)  mg/dL














Thrombosis Risk Factor Assmnt





- Choose All That Apply


Any of the Below Risk Factors Present?: Yes


Each Factor Represents 1 point: Obesity (BMI >25)


Other Risk Factors: Yes


Each Risk Factor Represents 3 Points: History of DVT/PE


Other congenital or acquired thrombophilia - If yes, enter type in comment: No


Thrombosis Risk Factor Assessment Total Risk Factor Score: 4


Thrombosis Risk Factor Assessment Level: Moderate Risk





Assessment and Plan


Assessment: 





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


History of coronary artery disease


History of DVT and PE on Eliquis


Obesity


Plan: 





This is a pleasant 58 years old female who presents because of chest pain, 

cardiac workup was negative and they cleared her for discharge.  However on the 

CAT scan showed they have subxiphoid muscular defect, with consults Gen. 

surgery to see the patient and further evaluation.Labs and medication were 

reviewed..  Continue same treatment.  Continue with symptomatic treatment.  

Resume home medication.  Monitor lytes and vitals.  DVT and GI prophylaxis.  

Further recommendations of the clinical course of the patient


DVT prophylaxis: Eliquis 


GI Prophylaxis: Pepcid





Prognosis is guarded

## 2019-02-19 NOTE — CT
EXAMINATION TYPE: CT chest wo con

 

DATE OF EXAM: 2/19/2019

 

COMPARISON: 10/30/2018

 

HISTORY: 38-year-old female chest pain at site of incision Scar widening for open heart

 

TECHNIQUE: Contiguous axial scanning of the chest without IV contrast. Coronal and sagittal reconstru
ctions performed.

 

CT DLP: 888.1 mGycm

Automated exposure control for dose reduction was used.

 

 

FINDINGS: 

Median sternotomy wires are present. There is no abnormal separation of the sternotomy, surrounding i
nflammation, or abnormal fluid collection at the sternotomy site.

 

However, there is a defect of the subxiphoid midline muscular wall with herniating epigastric fat. Th
e defect measures 1.6 cm wide and the hernia sac measures 1.7 cm wide by 1.8 cm thick by 3.0 cm crani
ocaudal.

 

Visualized upper abdomen shows cholecystectomy clips and small anterior splenule.

 

Heart borderline enlarged without pericardial effusion.

 

Aorta normal caliber with a conventional arch vessel branching anatomy.

 

No thoracic lymphadenopathy by CT size criteria.

 

Mild hazy dependent atelectasis. Some strandy atelectasis inferior lingula. No consolidation or pleur
al effusion.

 

Bones: No osseous destructive process.

 

 

IMPRESSION: 

 

1. FAT-CONTAINING SUBXIPHOID VENTRAL MIDLINE HERNIA. THE CHEST/ABDOMINAL WALL DEFECT MEASURES 1.6 CM 
WIDE AND THE HERNIA SAC MEASURES 1.7 X 1.8 X 3.0 CM.

2. OTHERWISE, THE STERNOTOMY APPEARS HEALED AND INTACT.

## 2019-02-19 NOTE — ECHOF
Referral Reason:



MEASUREMENTS

--------

HEIGHT: 160.0 cm

WEIGHT: 134.3 kg

BP: 106/69

RVIDd:   2.9 cm     (< 3.3)

IVSd:   1.4 cm     (0.6 - 1.1)

LVIDd:   4.3 cm     (3.9 - 5.3)

LVPWd:   1.4 cm     (0.6 - 1.1)

IVSs:   1.7 cm

LVIDs:   3.5 cm

LVPWs:   2.0 cm

LA Diam:   3.6 cm     (2.7 - 3.8)

LAESV Index (A-L):   26.43 ml/m

Ao Diam:   3.0 cm     (2.0 - 3.7)

MV EXCURSION:   19.544 mm     (> 18.000)

MV EF SLOPE:   137 mm/s     (70 - 150)

EPSS:   0.8 cm

MV E Karthik:   0.97 m/s

MV DecT:   178 ms

MV A Karthik:   0.94 m/s

MV E/A Ratio:   1.04 

RAP:   5.00 mmHg

RVSP:   41.43 mmHg







FINDINGS

--------

Sinus rhythm.

This was a technically difficult study with suboptimal views.

The left ventricular size is normal.   There is moderate concentric left ventricular hypertrophy.   O
verall left ventricular systolic function is low-normal with, an EF between 50 - 55 %.

The right ventricle is normal in size.

Normal LA  size by volume 22+/-6 ml/m2.

The right atrium is normal in size.

3 ml of Lumason was utilized for enhancement of images.

The aortic valve is trileaflet and appears structurally normal.

The mitral valve is normal.

Mild tricuspid regurgitation present.   There is mild pulmonary hypertension.   The right ventricular
 systolic pressure, as measured by Doppler, is 41.43mmHg.

The pulmonic valve was not well visualized.

The aortic root size is normal.

Normal inferior vena cava with normal inspiratory collapse consistent with estimated right atrial pre
ssure of  5 mmHg.   The inferior vena cava is mildly dilated.

There is no pericardial effusion.



CONCLUSIONS

--------

1. Sinus rhythm.

2. This was a technically difficult study with suboptimal views.

3. The left ventricular size is normal.

4. There is moderate concentric left ventricular hypertrophy.

5. Overall left ventricular systolic function is low-normal with, an EF between 50 - 55 %.

6. The right ventricle is normal in size.

7. Normal LA size by volume 22+/-6 ml/m2.

8. The right atrium is normal in size.

9. 3 ml of Lumason was utilized for enhancement of images.

10. The aortic valve is trileaflet and appears structurally normal.

11. The mitral valve is normal.

12. Mild tricuspid regurgitation present.

13. There is mild pulmonary hypertension.

14. The right ventricular systolic pressure, as measured by Doppler, is 41.43mmHg.

15. The pulmonic valve was not well visualized.

16. The aortic root size is normal.

17. Normal inferior vena cava with normal inspiratory collapse consistent with estimated right atrial
 pressure of  5 mmHg.

18. The inferior vena cava is mildly dilated.

19. There is no pericardial effusion.





SONOGRAPHER: Aixa Thomas RDCS

## 2019-02-20 VITALS — HEART RATE: 75 BPM | SYSTOLIC BLOOD PRESSURE: 109 MMHG | DIASTOLIC BLOOD PRESSURE: 67 MMHG

## 2019-02-20 VITALS — RESPIRATION RATE: 18 BRPM | TEMPERATURE: 98.3 F

## 2019-02-20 RX ADMIN — ISOSORBIDE MONONITRATE SCH MG: 30 TABLET, EXTENDED RELEASE ORAL at 08:03

## 2019-02-20 RX ADMIN — APIXABAN SCH MG: 5 TABLET, FILM COATED ORAL at 10:25

## 2019-02-20 RX ADMIN — ASPIRIN 325 MG ORAL TABLET SCH MG: 325 PILL ORAL at 10:25

## 2019-02-20 RX ADMIN — FAMOTIDINE SCH MG: 20 TABLET, FILM COATED ORAL at 08:03

## 2019-02-20 RX ADMIN — METOPROLOL TARTRATE SCH MG: 25 TABLET, FILM COATED ORAL at 08:03

## 2019-02-20 NOTE — P.PN
Subjective


Progress Note Date: 02/20/19


Principal diagnosis: 





This is a pleasant 38-year-old female patient who sees Dr. Jc in the office 

on regular basis with a past medical history significant for coronary artery 

disease and status post LIMA to LAD in the setting of coronary dissection as 

well as hypertension and dyslipidemia was admitted to the hospital with a chest 

discomfort.





She was ruled out for acute coronary event.  Enzymes came in to be 

unremarkable.  The EKG did not show any ischemic changes.





The patient did have large sternal scar.  We were concerned about dihesence in 

the sternal and because of that a computed tomography scan was performed and it 

did not show that but it did show ventral hernia.  General surgical consult was 

placed and the patient is in process to be seen by a surgeon later on today.





Objective





- Vital Signs


Vital signs: 


 Vital Signs











Temp  98.3 F   02/20/19 07:05


 


Pulse  75   02/20/19 07:05


 


Resp  18   02/20/19 07:05


 


BP  109/67   02/20/19 07:05


 


Pulse Ox  95   02/20/19 07:41








 Intake & Output











 02/19/19 02/20/19 02/20/19





 18:59 06:59 18:59


 


Intake Total 1080  


 


Balance 1080  


 


Intake:   


 


  Oral 480  


 


  Other 600  


 


Other:   


 


  # Voids 1 2 














- Constitutional


General appearance: Present: no acute distress





- Respiratory


Respiratory: bilateral: CTA





- Cardiovascular


Rhythm: regular


Heart sounds: normal: S1, S2





- Labs


CBC & Chem 7: 


 02/18/19 18:54





 02/18/19 18:54





Assessment and Plan


Assessment: 





Assessment


#1 atypical chest pain


#2 CAD as described above





Plan


#1 the patient is in process to be seen by a surgeon for evaluation of ventral 

hernia


#2 further recommendation to follow that


#3 from the cardiovascular standpoint overview the patient can be discharged 

home

## 2019-02-20 NOTE — P.DS
Providers


Date of admission: 


02/18/19 21:40





Attending physician: 


Catherine Rai





Consults: 





 





02/18/19 21:34


Consult Physician Urgent 


   Consulting Provider: [facility]


   Consult Reason/Comments: Chest Pain


   Do you want consulting provider notified?: Yes





02/19/19 17:50


Consult Physician Routine 


   Consulting Provider: Alex Schroeder


   Consult Reason/Comments: abnormal cat scan


   Do you want consulting provider notified?: Yes











Primary care physician: 


Katy [facility] Course: 





Diagnoses:





Substernal abdominal ventral hernia


History of coronary artery disease.  Status post bypass surgery


History of DVT and PE on [facility] course


This is a pleasant 58 years old female with past medical history of coronary 

artery disease status post bypass surgery.  She presents with epigastric pain.  

Patient was been evaluated by cardiologist team and there was a concern that 

there is the he says in her sternal wound, under the recommendation of 

cardiothoracic surgery team, computed tomography scan of the chest was done 

which shows subxiphoid midline and muscular wall defect with herniating 

epigastric fat.  General surgical team evaluated the patient for her ventral 

hernia and no surgical intervention is indicated, patient was cleared by both 

surgical and cardiology team for discharge.  Patient's symptoms improved 

however she has some epigastric pain and tenderness however this was not 

affecting her eating or movement.  She has normal bowel movements.  And her 

pain is controlled.  Patient felt ready to go home, and she was about to sign 

leaving AMA if she will not be discharged soon.


Problems and management plan was discussed with the patient and she verbalized 

understanding and acceptance and


She was found stable and can be discharged home in stable condition however she 

needs follow-up as an outpatient. pt states she will make her own appointment 

and she did not want medical staff to help her , pt was instructed to f/u with 

her pcp in one week as well as other consultants. 


prescription for imdur is provided for the pt , she did not need aspirin upon 

discharge as per cardiology . pt has all her home meds that she did not.  

Prescription for the





Gen: patient is a AAOx3, no distress


CVS: S1-S2, RRR, no murmur


Lungs: B/L CTA, no wheezing


Abdomen: soft, no distention, mild epigastric tenderness tenderness, positive 

bowel sounds


Extremity: no leg edema or induration





Time spent more than 35 minutes


Patient Condition at Discharge: Serious





Plan - Discharge Summary


Discharge Rx Participant: Yes


New Discharge Prescriptions: 


New


   Acetaminophen Tab [Tylenol] 650 mg PO Q4HR PRN  tab


     PRN Reason: Fever and/ or MILD Pain


   Isosorbide Mononitrate ER [Imdur] 30 mg PO DAILY #30 tab.er.24h





Continue


   Ranitidine HCl [Zantac] 150 mg PO BID


   Apixaban [Eliquis] 5 mg PO BID


   Varenicline Tartrate [Chantix Starter Pack] 0.5 mg PO AS DIRECTED


   Rosuvastatin Calcium [Crestor] 5 mg PO HS


   Metoprolol Tartrate 25 mg PO QAM


Discharge Medication List





Ranitidine HCl [Zantac] 150 mg PO BID 10/20/17 [History]


Apixaban [Eliquis] 5 mg PO BID 10/30/18 [History]


Metoprolol Tartrate 25 mg PO QAM 02/18/19 [History]


Rosuvastatin Calcium [Crestor] 5 mg PO HS 02/18/19 [History]


Varenicline Tartrate [Chantix Starter Pack] 0.5 mg PO AS DIRECTED 02/18/19 [

History]


Acetaminophen Tab [Tylenol] 650 mg PO Q4HR PRN  tab 02/20/19 [Rx]


Isosorbide Mononitrate ER [Imdur] 30 mg PO DAILY #30 tab.er.24h 02/20/19 [Rx]








Follow up Appointment(s)/Referral(s): 


Eloise Jc MD [STAFF PHYSICIAN] - 1 Week


Katy Tay MD [Primary Care Provider] - 3 Days


Alex Schroeder MD [STAFF PHYSICIAN] - 1 Week


Ambulatory/Diagnostic Orders: 


Complete Blood Count w/diff [LAB.AMB] Time Frame: 3 Days, Location: None 

Selected


Patient Instructions/Handouts:  Chest Pain (GEN)


Activity/Diet/Wound Care/Special Instructions: 














DIet: Low cholesterol








Activity: limited till F/U


Discharge Disposition: HOME SELF-CARE

## 2019-02-20 NOTE — P.GSCN
History of Present Illness


Consult date: 19


Reason for Consult: 





abnormal CT scan





Requesting physician: Bijan E Harry


History of present illness: 





CHIEF COMPLAINT: abnormal CT scan





HISTORY OF PRESENT ILLNESS: 38-year-old  female who originally 

presented to the emergency room due to chest pain.  General surgery was 

consulted for evaluation due to abnormal computed tomography scan.  Patient 

examined at the bedside.  Patient denies any pain or discomfort.  Denies nausea 

or vomiting. Denies constipation or diarrhea. Requesting something to eat. 





PAST MEDICAL HISTORY: 


See list.





PAST SURGICAL HISTORY: 


See list.





MEDICATIONS: 


See list.





ALLERGIES: 


See list.





SOCIAL HISTORY: No illicit drug use.  





REVIEW OF SYSTEMS: 


CONSTITUTIONAL: Denies fever or chills.


HEENT: Denies blurred vision, vision changes, or eye pain. Denies hemoptysis 


ENDOCRINE: Denies heat or cold intolerance.


CARDIOVASCULAR: Denies chest pain or pressure.


RESPIRATORY: No shortness of breath. 


GASTROINTESTINAL: Denies abdominal pain. Denies nausea or vomiting.


NEURO: Denies history of seizures.


PSYCH: No depression or suicidal ideation


HEMATOLOGIC: Denies bleeding disorders.


LYMPHATIC:  The patient denies any lumps and bumps around the neck. 


GENITOURINARY:  Denies any blood in urine or increased urinary frequency.  


MUSCULOSKELETAL:  Denies myalgias. Denies joint swelling. Denies decreased 

range of motion beyond patients baseline.


SKIN: Denies pruitis. Denies rash.





PHYSICAL EXAM: 


VITAL SIGNS: Currently stable.


GENERAL: Well-developed in no acute distress. 


HEENT:  No sclera icterus. Extraocular movements grossly intact.  Moist buccal 

mucosa. 


Head is atraumatic, normocephalic. Hears conversational speech. No nasal 

drainage.


NECK:  Supple without lymphadenopathy.


CHEST:  Non-labored respirations and equal bilateral excursions. 


CARDIOVASCULAR:  Regular rate with regular rhythm.  Palpable 2+ radial pulses.


ABDOMEN:  Soft.  Nondistended. Nontender.


MUSCULOSKELETAL:  No clubbing, cyanosis or edema.


NEUROLOGIC:  No focal or lateralizing signs.  Cranial nerves II through XII 

grossly intact.


PSYCH:  Appropriate affect.  Alert and oriented to person, place and time.


SKIN: Well perfused.  Good skin turgor. 








ASSESSMENT: 


1.  Subxiphoid ventral midline hernia








PLAN: 


1. No inpatient surgical intervention advised. Patient is stable for discharge 

from a surgical perspective. She may follow up with Dr. Schroeder outpatient.





Nurse practitioner note has been reviewed by physician. Signing provider agrees 

with the documented findings, assessment, and plan of care. 








Past Medical History


Past Medical History: Coronary Artery Disease (CAD), Deep Vein Thrombosis (DVT)

, Myocardial Infarction (MI), Pulmonary Embolus (PE)


Last Myocardial Infarction Date:: 2018


History of Any Multi-Drug Resistant Organisms: MRSA


Year Discovered:: 


MDRO Source:: abscess abdomen


Past Surgical History:  Section, Cholecystectomy, Coronary Bypass/CABG, 

Heart Catheterization


Additional Past Surgical History / Comment(s): right wrist surgery, carpal 

tunnel, MRSA S/P wound. left knee surgery


Past Anesthesia/Blood Transfusion Reactions: No Reported Reaction


Past Psychological History: No Psychological Hx Reported


Smoking Status: Current every day smoker


Past Alcohol Use History: None Reported


Past Drug Use History: None Reported





- Past Family History


  ** Mother


Family Medical History: Cancer, CVA/TIA, Diabetes Mellitus, Myocardial 

Infarction (MI), Renal Disease


Additional Family Medical History / Comment(s): uterine cancer, artificial 

valves





  ** Father


Additional Family Medical History / Comment(s): PAD





  ** Brother(s)


Family Medical History: Coronary Artery Disease (CAD), Diabetes Mellitus


Additional Family Medical History / Comment(s): 2 HEART STENTS, issue with 

heart valve





  ** Sister(s)


Additional Family Medical History / Comment(s): psych issues





  ** Daughter(s)


Family Medical History: No Reported History





  ** Son(s)


Family Medical History: No Reported History





Medications and Allergies


 Home Medications











 Medication  Instructions  Recorded  Confirmed  Type


 


Ranitidine HCl [Zantac] 150 mg PO BID 10/20/17 02/18/19 History


 


Apixaban [Eliquis] 5 mg PO BID 10/30/18 02/18/19 History


 


Metoprolol Tartrate 25 mg PO QAM 19 History


 


Rosuvastatin Calcium [Crestor] 5 mg PO HS 19 History


 


Varenicline Tartrate [Chantix 0.5 mg PO AS DIRECTED 19 History





Starter Pack]    











 Allergies











Allergy/AdvReac Type Severity Reaction Status Date / Time


 


adhesive Allergy  Rash/Hives Verified 19 19:54


 


albuterol Allergy  Anaphylaxis Verified 19 19:54


 


amoxicillin Allergy  Anaphylaxis Verified 02/18/19 19:54


 


ampicillin Allergy  Anaphylaxis Verified 19 19:54


 


azithromycin [From Zithromax] Allergy  Anaphylaxis Verified 19 19:54


 


erythromycin base Allergy  Anaphylaxis Verified 19 19:54


 


latex Allergy  Rash/Hives Verified 19 19:54


 


penicillin V Allergy  Anaphylaxis Verified 19 19:54














Surgical - Exam


 Vital Signs











Temp Pulse Resp BP Pulse Ox


 


 97.6 F   93   18   115/80   97 


 


 19 18:39  19 18:39  19 18:39  19 18:39  19 18:39














Results





- Labs





 19 18:54





 19 18:54

## 2019-06-28 ENCOUNTER — HOSPITAL ENCOUNTER (EMERGENCY)
Dept: HOSPITAL 47 - EC | Age: 39
Discharge: HOME | End: 2019-06-28
Payer: COMMERCIAL

## 2019-06-28 VITALS — TEMPERATURE: 98.3 F | RESPIRATION RATE: 18 BRPM

## 2019-06-28 VITALS — HEART RATE: 78 BPM | DIASTOLIC BLOOD PRESSURE: 72 MMHG | SYSTOLIC BLOOD PRESSURE: 139 MMHG

## 2019-06-28 DIAGNOSIS — I25.2: ICD-10-CM

## 2019-06-28 DIAGNOSIS — Z86.711: ICD-10-CM

## 2019-06-28 DIAGNOSIS — R05: ICD-10-CM

## 2019-06-28 DIAGNOSIS — Z79.899: ICD-10-CM

## 2019-06-28 DIAGNOSIS — Z91.048: ICD-10-CM

## 2019-06-28 DIAGNOSIS — Z95.1: ICD-10-CM

## 2019-06-28 DIAGNOSIS — I25.10: ICD-10-CM

## 2019-06-28 DIAGNOSIS — I82.502: Primary | ICD-10-CM

## 2019-06-28 DIAGNOSIS — Z95.818: ICD-10-CM

## 2019-06-28 DIAGNOSIS — Z91.040: ICD-10-CM

## 2019-06-28 DIAGNOSIS — Z88.8: ICD-10-CM

## 2019-06-28 DIAGNOSIS — F17.200: ICD-10-CM

## 2019-06-28 DIAGNOSIS — Z79.01: ICD-10-CM

## 2019-06-28 DIAGNOSIS — Z88.0: ICD-10-CM

## 2019-06-28 DIAGNOSIS — Z86.14: ICD-10-CM

## 2019-06-28 DIAGNOSIS — Z88.1: ICD-10-CM

## 2019-06-28 LAB
ALBUMIN SERPL-MCNC: 4 G/DL (ref 3.5–5)
ALP SERPL-CCNC: 78 U/L (ref 38–126)
ALT SERPL-CCNC: 24 U/L (ref 9–52)
ANION GAP SERPL CALC-SCNC: 9 MMOL/L
APTT BLD: 19.7 SEC (ref 22–30)
AST SERPL-CCNC: 22 U/L (ref 14–36)
BASOPHILS # BLD AUTO: 0.1 K/UL (ref 0–0.2)
BASOPHILS NFR BLD AUTO: 1 %
BUN SERPL-SCNC: 13 MG/DL (ref 7–17)
CALCIUM SPEC-MCNC: 9.4 MG/DL (ref 8.4–10.2)
CHLORIDE SERPL-SCNC: 104 MMOL/L (ref 98–107)
CO2 SERPL-SCNC: 27 MMOL/L (ref 22–30)
EOSINOPHIL # BLD AUTO: 0.2 K/UL (ref 0–0.7)
EOSINOPHIL NFR BLD AUTO: 3 %
ERYTHROCYTE [DISTWIDTH] IN BLOOD BY AUTOMATED COUNT: 4.77 M/UL (ref 3.8–5.4)
ERYTHROCYTE [DISTWIDTH] IN BLOOD: 13.6 % (ref 11.5–15.5)
GLUCOSE SERPL-MCNC: 113 MG/DL (ref 74–99)
HCT VFR BLD AUTO: 42.6 % (ref 34–46)
HGB BLD-MCNC: 13.6 GM/DL (ref 11.4–16)
INR PPP: 0.9 (ref ?–1.2)
LYMPHOCYTES # SPEC AUTO: 2.9 K/UL (ref 1–4.8)
LYMPHOCYTES NFR SPEC AUTO: 32 %
MCH RBC QN AUTO: 28.6 PG (ref 25–35)
MCHC RBC AUTO-ENTMCNC: 32 G/DL (ref 31–37)
MCV RBC AUTO: 89.2 FL (ref 80–100)
MONOCYTES # BLD AUTO: 0.2 K/UL (ref 0–1)
MONOCYTES NFR BLD AUTO: 3 %
NEUTROPHILS # BLD AUTO: 5.4 K/UL (ref 1.3–7.7)
NEUTROPHILS NFR BLD AUTO: 61 %
PLATELET # BLD AUTO: 251 K/UL (ref 150–450)
POTASSIUM SERPL-SCNC: 3.8 MMOL/L (ref 3.5–5.1)
PROT SERPL-MCNC: 6.7 G/DL (ref 6.3–8.2)
PT BLD: 9.7 SEC (ref 9–12)
SODIUM SERPL-SCNC: 140 MMOL/L (ref 137–145)
WBC # BLD AUTO: 8.9 K/UL (ref 3.8–10.6)

## 2019-06-28 PROCEDURE — 85730 THROMBOPLASTIN TIME PARTIAL: CPT

## 2019-06-28 PROCEDURE — 85025 COMPLETE CBC W/AUTO DIFF WBC: CPT

## 2019-06-28 PROCEDURE — 80053 COMPREHEN METABOLIC PANEL: CPT

## 2019-06-28 PROCEDURE — 36415 COLL VENOUS BLD VENIPUNCTURE: CPT

## 2019-06-28 PROCEDURE — 71275 CT ANGIOGRAPHY CHEST: CPT

## 2019-06-28 PROCEDURE — 99284 EMERGENCY DEPT VISIT MOD MDM: CPT

## 2019-06-28 PROCEDURE — 85610 PROTHROMBIN TIME: CPT

## 2019-06-28 PROCEDURE — 81025 URINE PREGNANCY TEST: CPT

## 2019-06-28 PROCEDURE — 93971 EXTREMITY STUDY: CPT

## 2019-06-28 NOTE — ED
Extremity Problem HPI





- General


Chief complaint: Extremity Problem,Nontraumatic


Stated complaint: left leg swelling


Time Seen by Provider: 19 17:50


Source: patient, RN notes reviewed, old records reviewed


Mode of arrival: ambulatory


Limitations: no limitations





- History of Present Illness


Initial comments: 





Patient's 38-year-old female with history of DVTs and PEs.  She is currently 

answer altered.  She reports she is noncompliant with her medications.  Has 

missed multiple doses past week.  She states since Wednesday she's noticed 

increased swelling and pain to her left leg.  She states that she's noticed some

redness as well.  She's that she's had a slight cough.  She denies any chest 

pain or shortness of breath but states she's had no symptoms when she's had 

prior PEs.





- Related Data


                                Home Medications











 Medication  Instructions  Recorded  Confirmed


 


Ranitidine HCl [Zantac] 150 mg PO BID 10/20/17 02/18/19


 


Apixaban [Eliquis] 5 mg PO BID 10/30/18 02/18/19


 


Metoprolol Tartrate 25 mg PO QAM 19


 


Rosuvastatin Calcium [Crestor] 5 mg PO HS 19


 


Varenicline Tartrate [Chantix 0.5 mg PO AS DIRECTED 19





Starter Pack]   








                                  Previous Rx's











 Medication  Instructions  Recorded


 


Acetaminophen Tab [Tylenol] 650 mg PO Q4HR PRN  tab 19


 


Isosorbide Mononitrate ER [Imdur] 30 mg PO DAILY #30 tab.er.24h 19











                                    Allergies











Allergy/AdvReac Type Severity Reaction Status Date / Time


 


adhesive Allergy  Rash/Hives Verified 19 17:38


 


albuterol Allergy  Anaphylaxis Verified 19 17:38


 


amoxicillin Allergy  Anaphylaxis Verified 19 17:38


 


ampicillin Allergy  Anaphylaxis Verified 19 17:38


 


azithromycin [From Zithromax] Allergy  Anaphylaxis Verified 19 17:38


 


erythromycin base Allergy  Anaphylaxis Verified 19 17:38


 


latex Allergy  Rash/Hives Verified 19 17:38


 


penicillin V Allergy  Anaphylaxis Verified 19 17:38














Review of Systems


ROS Statement: 


Those systems with pertinent positive or pertinent negative responses have been 

documented in the HPI.





ROS Other: All systems not noted in ROS Statement are negative.





Past Medical History


Past Medical History: Coronary Artery Disease (CAD), Deep Vein Thrombosis (DVT),

Myocardial Infarction (MI), Pulmonary Embolus (PE)


Last Myocardial Infarction Date:: 2018


History of Any Multi-Drug Resistant Organisms: MRSA


Date of last positivie culture/infection: 


MDRO Source:: abscess abdomen


Past Surgical History:  Section, Cholecystectomy, Coronary Bypass/CABG, 

Heart Catheterization


Additional Past Surgical History / Comment(s): right wrist surgery, carpal 

tunnel, MRSA S/P wound. left knee surgery


Past Anesthesia/Blood Transfusion Reactions: No Reported Reaction


Past Psychological History: No Psychological Hx Reported


Smoking Status: Current every day smoker


Past Alcohol Use History: None Reported


Past Drug Use History: None Reported





- Past Family History


  ** Mother


Family Medical History: Cancer, CVA/TIA, Diabetes Mellitus, Myocardial 

Infarction (MI), Renal Disease


Additional Family Medical History / Comment(s): uterine cancer, artificial 

valves





  ** Father


Additional Family Medical History / Comment(s): PAD





  ** Brother(s)


Family Medical History: Coronary Artery Disease (CAD), Diabetes Mellitus


Additional Family Medical History / Comment(s): 2 HEART STENTS, issue with heart

valve





  ** Sister(s)


Additional Family Medical History / Comment(s): psych issues





  ** Daughter(s)


Family Medical History: No Reported History





  ** Son(s)


Family Medical History: No Reported History





General Exam





- General Exam Comments


Initial Comments: 





Alert and oriented 38-year-old female.  No distress.


Limitations: no limitations


General appearance: alert, in no apparent distress


Head exam: Present: atraumatic, normocephalic, normal inspection


Eye exam: Present: normal appearance, PERRL, EOMI.  Absent: scleral icterus, 

conjunctival injection, periorbital swelling


ENT exam: Present: normal exam, mucous membranes moist


Neck exam: Present: normal inspection


Respiratory exam: Present: normal lung sounds bilaterally.  Absent: respiratory 

distress, wheezes, rales, rhonchi, stridor


Cardiovascular Exam: Present: regular rate, normal rhythm, normal heart sounds. 

Absent: systolic murmur, diastolic murmur, rubs, gallop, clicks


GI/Abdominal exam: Present: soft, normal bowel sounds.  Absent: distended, 

tenderness, guarding, rebound, rigid


Extremities exam: Present: normal inspection, full ROM, normal capillary refill,

other (swelling and mild erythema over left lower extremity. ).  Absent: 

tenderness, pedal edema, joint swelling, calf tenderness


Back exam: Present: normal inspection


Neurological exam: Present: alert, oriented X3, CN II-XII intact


Psychiatric exam: Present: normal affect


Skin exam: Present: warm, dry, intact, normal color.  Absent: rash





Course


                                   Vital Signs











  19





  17:36 21:38


 


Temperature 98.3 F 


 


Pulse Rate 100 78


 


Respiratory 18 18





Rate  


 


Blood Pressure 140/81 139/72


 


O2 Sat by Pulse 98 100





Oximetry  














Medical Decision Making





- Medical Decision Making





38 year old female presents with Left lower extremity swelling, noncompliant 

with blood thinner meds due to chornic DvT and PE. Today US shows chronic non 

oculusvie dvt. Discussed CT is negative for PE. LAbs are normal. Discussed RICE 

leg, using compression stockings and to have close PCP follow up. Discussed to 

be complaint with blood thinner medication. 





- Lab Data


Result diagrams: 


                                 19 18:30





                                 19 18:30


                                   Lab Results











  19 Range/Units





  18:30 18:30 18:30 


 


WBC  8.9    (3.8-10.6)  k/uL


 


RBC  4.77    (3.80-5.40)  m/uL


 


Hgb  13.6    (11.4-16.0)  gm/dL


 


Hct  42.6    (34.0-46.0)  %


 


MCV  89.2    (80.0-100.0)  fL


 


MCH  28.6    (25.0-35.0)  pg


 


MCHC  32.0    (31.0-37.0)  g/dL


 


RDW  13.6    (11.5-15.5)  %


 


Plt Count  251    (150-450)  k/uL


 


Neutrophils %  61    %


 


Lymphocytes %  32    %


 


Monocytes %  3    %


 


Eosinophils %  3    %


 


Basophils %  1    %


 


Neutrophils #  5.4    (1.3-7.7)  k/uL


 


Lymphocytes #  2.9    (1.0-4.8)  k/uL


 


Monocytes #  0.2    (0-1.0)  k/uL


 


Eosinophils #  0.2    (0-0.7)  k/uL


 


Basophils #  0.1    (0-0.2)  k/uL


 


PT    9.7  (9.0-12.0)  sec


 


INR    0.9  (<1.2)  


 


APTT    19.7 L  (22.0-30.0)  sec


 


Sodium   140   (137-145)  mmol/L


 


Potassium   3.8   (3.5-5.1)  mmol/L


 


Chloride   104   ()  mmol/L


 


Carbon Dioxide   27   (22-30)  mmol/L


 


Anion Gap   9   mmol/L


 


BUN   13   (7-17)  mg/dL


 


Creatinine   0.66   (0.52-1.04)  mg/dL


 


Est GFR (CKD-EPI)AfAm   >90   (>60 ml/min/1.73 sqM)  


 


Est GFR (CKD-EPI)NonAf   >90   (>60 ml/min/1.73 sqM)  


 


Glucose   113 H   (74-99)  mg/dL


 


Calcium   9.4   (8.4-10.2)  mg/dL


 


Total Bilirubin   0.4   (0.2-1.3)  mg/dL


 


AST   22   (14-36)  U/L


 


ALT   24   (9-52)  U/L


 


Alkaline Phosphatase   78   ()  U/L


 


Total Protein   6.7   (6.3-8.2)  g/dL


 


Albumin   4.0   (3.5-5.0)  g/dL


 


Urine HCG, Qual     (Not Detectd)  














  19 Range/Units





  19:16 


 


WBC   (3.8-10.6)  k/uL


 


RBC   (3.80-5.40)  m/uL


 


Hgb   (11.4-16.0)  gm/dL


 


Hct   (34.0-46.0)  %


 


MCV   (80.0-100.0)  fL


 


MCH   (25.0-35.0)  pg


 


MCHC   (31.0-37.0)  g/dL


 


RDW   (11.5-15.5)  %


 


Plt Count   (150-450)  k/uL


 


Neutrophils %   %


 


Lymphocytes %   %


 


Monocytes %   %


 


Eosinophils %   %


 


Basophils %   %


 


Neutrophils #   (1.3-7.7)  k/uL


 


Lymphocytes #   (1.0-4.8)  k/uL


 


Monocytes #   (0-1.0)  k/uL


 


Eosinophils #   (0-0.7)  k/uL


 


Basophils #   (0-0.2)  k/uL


 


PT   (9.0-12.0)  sec


 


INR   (<1.2)  


 


APTT   (22.0-30.0)  sec


 


Sodium   (137-145)  mmol/L


 


Potassium   (3.5-5.1)  mmol/L


 


Chloride   ()  mmol/L


 


Carbon Dioxide   (22-30)  mmol/L


 


Anion Gap   mmol/L


 


BUN   (7-17)  mg/dL


 


Creatinine   (0.52-1.04)  mg/dL


 


Est GFR (CKD-EPI)AfAm   (>60 ml/min/1.73 sqM)  


 


Est GFR (CKD-EPI)NonAf   (>60 ml/min/1.73 sqM)  


 


Glucose   (74-99)  mg/dL


 


Calcium   (8.4-10.2)  mg/dL


 


Total Bilirubin   (0.2-1.3)  mg/dL


 


AST   (14-36)  U/L


 


ALT   (9-52)  U/L


 


Alkaline Phosphatase   ()  U/L


 


Total Protein   (6.3-8.2)  g/dL


 


Albumin   (3.5-5.0)  g/dL


 


Urine HCG, Qual  Not Detected  (Not Detectd)  














- Radiology Data


Radiology results: report reviewed


Doppler US is negativ for acute DVT, patient has chronic non occlusive 

thrombosis. 





CT chest angio is negative for PE. 





Disposition


Clinical Impression: 


 Chronic deep vein thrombosis (DVT) of left lower extremity





Disposition: HOME SELF-CARE


Condition: Good


Instructions (If sedation given, give patient instructions):  Deep Vein 

Thrombosis Prevention (ED)


Additional Instructions: 


Patient advised to continue her blood thinner as already prescribed.  Monitor 

the leg for any worsening swelling.  Patient could use thigh-high compression 

stockings as well as have close follow-up with your primary care doctor.  Also 

recommended keeping her feet up and elevated. 


Is patient prescribed a controlled substance at d/c from ED?: No


Referrals: 


Katy Tay MD [Primary Care Provider] - 1-2 days


Time of Disposition: 20:54

## 2019-06-28 NOTE — CT
EXAMINATION TYPE: CT chest angio for PE with contrast and with 3-D reconstruction renderings

 

DATE OF EXAM: 6/28/2019

 

COMPARISON: None

 

HISTORY: Limb swelling, dyspnea, pain

 

CT DLP: 686.6 mGycm

Automated exposure control for dose reduction was used.

 

CONTRAST: 80 mL [id number]-[id number] intravenously.

 

FINDINGS:

 

LUNGS: The lungs are grossly clear, there is no concerning parenchymal mass or nodule identified.  Th
ere is no pleural effusion or pneumothorax seen. The tracheobronchial tree is patent.

 

MEDIASTINUM: There is satisfactory enhancement of the pulmonary artery and its branches with no CT ev
idence for pulmonary embolism. No acute aortic findings. There are no greater than 1 cm hilar or medi
astinal lymph nodes. There is mild cardiomegaly but no pericardial effusion is seen.  

 

OTHER:  No additional significant abnormality is seen.

 

IMPRESSION:

No acute process.

## 2019-06-28 NOTE — US
EXAMINATION TYPE: US venous doppler duplex LE LT

 

DATE OF EXAM: 6/28/2019 6:55 PM

 

COMPARISON: US

 

CLINICAL HISTORY: Pain. Pt states left leg swelling/ Pt states history of DVT and PE/ pt states recen
tly not taking blood thinners as properly prescribed

 

SIDE PERFORMED: Left  

 

TECHNIQUE:  The lower extremity deep venous system is examined utilizing real time linear array sonog
arnaldo with graded compression, doppler sonography and color-flow sonography.

 

VESSELS IMAGED:

External Iliac Vein (EIV)

Common Femoral Vein

Deep Femoral Vein

Greater Saphenous Vein *

Femoral Vein

Popliteal Vein

Small Saphenous Vein *

Proximal Calf Veins

(* superficial vessels)

 

FINDINGS: Grayscale, color doppler, spectral doppler imaging performed of the deep veins of the lower
 extremities.  There is normal flow, compressibility, vascular waveforms.

 

 

IMPRESSION: 

 1. NEGATIVE FOR ACUTE DVT, LEFT LOWER EXTREMITY .

 2. Chronic non-occlusive thrombus within left popliteal vein.

## 2019-09-04 ENCOUNTER — HOSPITAL ENCOUNTER (OUTPATIENT)
Dept: HOSPITAL 47 - EC | Age: 39
Setting detail: OBSERVATION
LOS: 1 days | Discharge: HOME | End: 2019-09-05
Attending: HOSPITALIST | Admitting: HOSPITALIST
Payer: COMMERCIAL

## 2019-09-04 DIAGNOSIS — Z82.3: ICD-10-CM

## 2019-09-04 DIAGNOSIS — Z79.899: ICD-10-CM

## 2019-09-04 DIAGNOSIS — I10: ICD-10-CM

## 2019-09-04 DIAGNOSIS — E66.01: ICD-10-CM

## 2019-09-04 DIAGNOSIS — F41.9: ICD-10-CM

## 2019-09-04 DIAGNOSIS — K46.9: ICD-10-CM

## 2019-09-04 DIAGNOSIS — E78.5: ICD-10-CM

## 2019-09-04 DIAGNOSIS — F17.200: ICD-10-CM

## 2019-09-04 DIAGNOSIS — Z80.49: ICD-10-CM

## 2019-09-04 DIAGNOSIS — Z95.1: ICD-10-CM

## 2019-09-04 DIAGNOSIS — R11.0: ICD-10-CM

## 2019-09-04 DIAGNOSIS — R06.00: ICD-10-CM

## 2019-09-04 DIAGNOSIS — Z91.048: ICD-10-CM

## 2019-09-04 DIAGNOSIS — Z88.1: ICD-10-CM

## 2019-09-04 DIAGNOSIS — Z86.718: ICD-10-CM

## 2019-09-04 DIAGNOSIS — Z91.040: ICD-10-CM

## 2019-09-04 DIAGNOSIS — T45.516A: ICD-10-CM

## 2019-09-04 DIAGNOSIS — Z81.8: ICD-10-CM

## 2019-09-04 DIAGNOSIS — I25.10: ICD-10-CM

## 2019-09-04 DIAGNOSIS — Z83.3: ICD-10-CM

## 2019-09-04 DIAGNOSIS — Z79.01: ICD-10-CM

## 2019-09-04 DIAGNOSIS — M79.89: ICD-10-CM

## 2019-09-04 DIAGNOSIS — Z88.0: ICD-10-CM

## 2019-09-04 DIAGNOSIS — Z82.49: ICD-10-CM

## 2019-09-04 DIAGNOSIS — Z86.14: ICD-10-CM

## 2019-09-04 DIAGNOSIS — Z88.8: ICD-10-CM

## 2019-09-04 DIAGNOSIS — Z90.49: ICD-10-CM

## 2019-09-04 DIAGNOSIS — R07.89: Primary | ICD-10-CM

## 2019-09-04 DIAGNOSIS — Z84.1: ICD-10-CM

## 2019-09-04 DIAGNOSIS — Z86.711: ICD-10-CM

## 2019-09-04 DIAGNOSIS — R06.02: ICD-10-CM

## 2019-09-04 DIAGNOSIS — I25.2: ICD-10-CM

## 2019-09-04 DIAGNOSIS — R10.13: ICD-10-CM

## 2019-09-04 LAB
ALBUMIN SERPL-MCNC: 4 G/DL (ref 3.5–5)
ALP SERPL-CCNC: 76 U/L (ref 38–126)
ALT SERPL-CCNC: 16 U/L (ref 9–52)
AMYLASE SERPL-CCNC: 55 U/L (ref 30–110)
ANION GAP SERPL CALC-SCNC: 7 MMOL/L
APTT BLD: 22.3 SEC (ref 22–30)
AST SERPL-CCNC: 22 U/L (ref 14–36)
BASOPHILS # BLD AUTO: 0.1 K/UL (ref 0–0.2)
BASOPHILS NFR BLD AUTO: 1 %
BUN SERPL-SCNC: 12 MG/DL (ref 7–17)
CALCIUM SPEC-MCNC: 9.5 MG/DL (ref 8.4–10.2)
CHLORIDE SERPL-SCNC: 105 MMOL/L (ref 98–107)
CO2 SERPL-SCNC: 27 MMOL/L (ref 22–30)
D DIMER PPP FEU-MCNC: 0.39 MG/L FEU (ref ?–0.6)
EOSINOPHIL # BLD AUTO: 0.2 K/UL (ref 0–0.7)
EOSINOPHIL NFR BLD AUTO: 2 %
ERYTHROCYTE [DISTWIDTH] IN BLOOD BY AUTOMATED COUNT: 4.69 M/UL (ref 3.8–5.4)
ERYTHROCYTE [DISTWIDTH] IN BLOOD: 13.6 % (ref 11.5–15.5)
GLUCOSE SERPL-MCNC: 111 MG/DL (ref 74–99)
HCT VFR BLD AUTO: 41.6 % (ref 34–46)
HGB BLD-MCNC: 13.3 GM/DL (ref 11.4–16)
INR PPP: 0.9 (ref ?–1.2)
LYMPHOCYTES # SPEC AUTO: 2.9 K/UL (ref 1–4.8)
LYMPHOCYTES NFR SPEC AUTO: 29 %
MAGNESIUM SPEC-SCNC: 1.9 MG/DL (ref 1.6–2.3)
MCH RBC QN AUTO: 28.4 PG (ref 25–35)
MCHC RBC AUTO-ENTMCNC: 32.1 G/DL (ref 31–37)
MCV RBC AUTO: 88.7 FL (ref 80–100)
MONOCYTES # BLD AUTO: 0.3 K/UL (ref 0–1)
MONOCYTES NFR BLD AUTO: 3 %
NEUTROPHILS # BLD AUTO: 6.2 K/UL (ref 1.3–7.7)
NEUTROPHILS NFR BLD AUTO: 64 %
PH UR: 6 [PH] (ref 5–8)
PLATELET # BLD AUTO: 266 K/UL (ref 150–450)
POTASSIUM SERPL-SCNC: 4.4 MMOL/L (ref 3.5–5.1)
PROT SERPL-MCNC: 6.8 G/DL (ref 6.3–8.2)
PT BLD: 9.5 SEC (ref 9–12)
RBC UR QL: 4 /HPF (ref 0–5)
SODIUM SERPL-SCNC: 139 MMOL/L (ref 137–145)
SP GR UR: 1.02 (ref 1–1.03)
SQUAMOUS UR QL AUTO: 3 /HPF (ref 0–4)
TROPONIN I SERPL-MCNC: <0.012 NG/ML (ref 0–0.03)
UROBILINOGEN UR QL STRIP: <2 MG/DL (ref ?–2)
WBC # BLD AUTO: 9.8 K/UL (ref 3.8–10.6)
WBC #/AREA URNS HPF: 2 /HPF (ref 0–5)

## 2019-09-04 PROCEDURE — 36415 COLL VENOUS BLD VENIPUNCTURE: CPT

## 2019-09-04 PROCEDURE — 81025 URINE PREGNANCY TEST: CPT

## 2019-09-04 PROCEDURE — 93005 ELECTROCARDIOGRAM TRACING: CPT

## 2019-09-04 PROCEDURE — 84484 ASSAY OF TROPONIN QUANT: CPT

## 2019-09-04 PROCEDURE — 99285 EMERGENCY DEPT VISIT HI MDM: CPT

## 2019-09-04 PROCEDURE — 82150 ASSAY OF AMYLASE: CPT

## 2019-09-04 PROCEDURE — 83880 ASSAY OF NATRIURETIC PEPTIDE: CPT

## 2019-09-04 PROCEDURE — 71046 X-RAY EXAM CHEST 2 VIEWS: CPT

## 2019-09-04 PROCEDURE — 85610 PROTHROMBIN TIME: CPT

## 2019-09-04 PROCEDURE — 85025 COMPLETE CBC W/AUTO DIFF WBC: CPT

## 2019-09-04 PROCEDURE — 82553 CREATINE MB FRACTION: CPT

## 2019-09-04 PROCEDURE — 83690 ASSAY OF LIPASE: CPT

## 2019-09-04 PROCEDURE — 81001 URINALYSIS AUTO W/SCOPE: CPT

## 2019-09-04 PROCEDURE — 80053 COMPREHEN METABOLIC PANEL: CPT

## 2019-09-04 PROCEDURE — 80061 LIPID PANEL: CPT

## 2019-09-04 PROCEDURE — 85730 THROMBOPLASTIN TIME PARTIAL: CPT

## 2019-09-04 PROCEDURE — 83735 ASSAY OF MAGNESIUM: CPT

## 2019-09-04 PROCEDURE — 85379 FIBRIN DEGRADATION QUANT: CPT

## 2019-09-04 NOTE — ED
General Adult HPI





- General


Source: patient, RN notes reviewed


Mode of arrival: ambulatory


Limitations: no limitations





<Ismael Culver - Last Filed: 19 19:33>





<Radha Rojas - Last Filed: 19 15:14>





- General


Chief complaint: Chest Pain


Stated complaint: SOB/chest pain


Time Seen by Provider: 19 17:49





- History of Present Illness


Initial comments: 





39-year-old female with a past medical history of coronary artery disease, DVT, 

PE, MI presents to the emergency department for a chief complaint of chest pain.

 Patient states she is having left-sided squeezing chest pain that has been 

ongoing intermittently for the past 3-4 hours.  States pain has subsided at this

time.  Patient has a strong cardiac history.  She did have some associated 

shortness of breath earlier today but states this has also subsided.  Patient 

did take note throat earlier today but states it did not help.  Patient does 

admit to swelling in the left leg but states this always happens when she stands

on her feet so this is not abnormal for her.  Patient is on Eliquis but is 

noncompliant stating she only takes his "when she remembers."Patient has no 

other complaints at this time including shortness of breath, chest pain, 

abdominal pain, nausea or vomiting, headache, or visual changes. (Ismael Culver)





- Related Data


                                Home Medications











 Medication  Instructions  Recorded  Confirmed


 


Apixaban [Eliquis] 5 mg PO BID 10/30/18 09/04/19


 


Metoprolol Tartrate 25 mg PO BID 19


 


Nitroglycerin Sl Tabs [Nitrostat] 0.4 mg SUBLINGUAL Q5M PRN 19











                                    Allergies











Allergy/AdvReac Type Severity Reaction Status Date / Time


 


adhesive Allergy  Rash/Hives Verified 19 17:52


 


albuterol Allergy  Anaphylaxis Verified 19 17:52


 


amoxicillin Allergy  Anaphylaxis Verified 19 17:52


 


ampicillin Allergy  Anaphylaxis Verified 19 17:52


 


azithromycin [From Zithromax] Allergy  Anaphylaxis Verified 19 17:52


 


erythromycin base Allergy  Anaphylaxis Verified 19 17:52


 


latex Allergy  Rash/Hives Verified 19 17:52


 


penicillin V Allergy  Anaphylaxis Verified 19 17:52














Review of Systems


ROS Other: All systems not noted in ROS Statement are negative.





<Ismael Culver P - Last Filed: 19 19:33>


ROS Other: All systems not noted in ROS Statement are negative.





<BobRadha TIMUR - Last Filed: 19 15:14>


ROS Statement: 


Those systems with pertinent positive or pertinent negative responses have been 

documented in the HPI.








Past Medical History


Past Medical History: Coronary Artery Disease (CAD), Deep Vein Thrombosis (DVT),

Myocardial Infarction (MI), Pulmonary Embolus (PE)


Last Myocardial Infarction Date:: 2018


History of Any Multi-Drug Resistant Organisms: MRSA


Date of last positivie culture/infection: 


MDRO Source:: abscess abdomen


Past Surgical History:  Section, Cholecystectomy, Coronary Bypass/CABG, 

Heart Catheterization


Additional Past Surgical History / Comment(s): right wrist surgery, carpal 

tunnel, MRSA S/P wound. left knee surgery


Past Anesthesia/Blood Transfusion Reactions: No Reported Reaction


Past Psychological History: No Psychological Hx Reported


Smoking Status: Current every day smoker


Past Alcohol Use History: None Reported


Past Drug Use History: None Reported





- Past Family History


  ** Mother


Family Medical History: Cancer, CVA/TIA, Diabetes Mellitus, Myocardial 

Infarction (MI), Renal Disease


Additional Family Medical History / Comment(s): uterine cancer, artificial 

valves





  ** Father


Additional Family Medical History / Comment(s): PAD





  ** Brother(s)


Family Medical History: Coronary Artery Disease (CAD), Diabetes Mellitus


Additional Family Medical History / Comment(s): 2 HEART STENTS, issue with heart

valve





  ** Sister(s)


Additional Family Medical History / Comment(s): psych issues





  ** Daughter(s)


Family Medical History: No Reported History





  ** Son(s)


Family Medical History: No Reported History





<Ismael Culver P - Last Filed: 19 19:33>





General Exam


Limitations: no limitations





<Ismael Culver - Last Filed: 19 19:33>





Course





                                   Vital Signs











  19





  17:41 18:28 19:46


 


Temperature 98.5 F  


 


Pulse Rate 87 73 83


 


Pulse Rate [   





Pulse Oximetery   





]   


 


Respiratory 18 18 18





Rate   


 


Blood Pressure 141/87 149/83 127/94


 


Blood Pressure   





[Left Arm]   


 


O2 Sat by Pulse 98 99 100





Oximetry   














  19





  23:00 00:00 00:30


 


Temperature  97.7 F 


 


Pulse Rate 72  


 


Pulse Rate [  69 69





Pulse Oximetery   





]   


 


Respiratory 18 17 17





Rate   


 


Blood Pressure 114/71  


 


Blood Pressure  123/80 





[Left Arm]   


 


O2 Sat by Pulse 95 95 





Oximetry   














EKG Findings





- EKG Comments:


EKG Findings:: Normal sinus rhythm, ventricular rate 79, MA interval 152, QTC 

428, compared to previous EKG, similar appearance.





<Ismael Culver - Last Filed: 19 19:33>





Medical Decision Making





- Lab Data


Result diagrams: 


                                 19 18:05





                                 19 18:05





<Ismael Culver - Last Filed: 19 19:33>





- Lab Data


Result diagrams: 


                                 19 18:05





                                 19 18:05





<Radha Rojas - Last Filed: 19 15:14>





- Medical Decision Making











39-year-old female with strong cardiac history presents for left-sided squeezing

chest pain intermittently for the past 3-4 hours. In 2018 patient 

underwent bypass grafting secondary to a total occlusion of the LAD with sub

sequent DVT and PE. Admits to associated shortness of breath, denies nausea or 

diaphoresis.  Patient states pain has resolved at this time.  She has been 

stable with stable vitals throughout her ER stay.  On exam she does not have any

reproducible tenderness in the area of pain.  Does have some tenderness to the 

superior anterior left chest wall but states this is chronic after her CABG over

one year ago.  EKG does not show any evidence of ST elevation or depression.  

This was reviewed by myself and Dr. Heath. CBC and CMP are unremarkable.  D-

dimer was obtained as patient appears to be noncompliant on her anticoagulation 

therapy which is within normal limits at 0.93.  Initial troponin is negative.  

Chest x-ray shows no active cardiopulmonary disease.  At this time given 

patient's strong cardiac history she will be admitted to trend troponins and 

cardiology consultation.  Patient's last cath was 2018 and last echo 

was in January of this year. (Ismael Culver was available for consultation in the emergency department. The history and 

physical exam was done by the midlevel provider. I was consulted for this 

patient's care. I reviewed the case and with the patients large cardiac history,

I did recommend hospital admission. I discussed the case with Dr. Rai who 

accepted admission of the patient.  (Radha Rojas)





- Lab Data





                                   Lab Results











  19 Range/Units





  18:05 18:05 18:05 


 


WBC  9.8    (3.8-10.6)  k/uL


 


RBC  4.69    (3.80-5.40)  m/uL


 


Hgb  13.3    (11.4-16.0)  gm/dL


 


Hct  41.6    (34.0-46.0)  %


 


MCV  88.7    (80.0-100.0)  fL


 


MCH  28.4    (25.0-35.0)  pg


 


MCHC  32.1    (31.0-37.0)  g/dL


 


RDW  13.6    (11.5-15.5)  %


 


Plt Count  266    (150-450)  k/uL


 


Neutrophils %  64    %


 


Lymphocytes %  29    %


 


Monocytes %  3    %


 


Eosinophils %  2    %


 


Basophils %  1    %


 


Neutrophils #  6.2    (1.3-7.7)  k/uL


 


Lymphocytes #  2.9    (1.0-4.8)  k/uL


 


Monocytes #  0.3    (0-1.0)  k/uL


 


Eosinophils #  0.2    (0-0.7)  k/uL


 


Basophils #  0.1    (0-0.2)  k/uL


 


PT     (9.0-12.0)  sec


 


INR     (<1.2)  


 


APTT     (22.0-30.0)  sec


 


D-Dimer     (<0.60)  mg/L FEU


 


Sodium   139   (137-145)  mmol/L


 


Potassium   4.4   (3.5-5.1)  mmol/L


 


Chloride   105   ()  mmol/L


 


Carbon Dioxide   27   (22-30)  mmol/L


 


Anion Gap   7   mmol/L


 


BUN   12   (7-17)  mg/dL


 


Creatinine   0.72   (0.52-1.04)  mg/dL


 


Est GFR (CKD-EPI)AfAm   >90   (>60 ml/min/1.73 sqM)  


 


Est GFR (CKD-EPI)NonAf   >90   (>60 ml/min/1.73 sqM)  


 


Glucose   111 H   (74-99)  mg/dL


 


Calcium   9.5   (8.4-10.2)  mg/dL


 


Magnesium   1.9   (1.6-2.3)  mg/dL


 


Total Bilirubin   0.2   (0.2-1.3)  mg/dL


 


AST   22   (14-36)  U/L


 


ALT   16   (9-52)  U/L


 


Alkaline Phosphatase   76   ()  U/L


 


CK-MB (CK-2)    0.5  (0.0-2.4)  ng/mL


 


Troponin I    <0.012  (0.000-0.034)  ng/mL


 


NT-Pro-B Natriuret Pep     pg/mL


 


Total Protein   6.8   (6.3-8.2)  g/dL


 


Albumin   4.0   (3.5-5.0)  g/dL


 


Amylase   55   ()  U/L


 


Lipase   150   ()  U/L


 


Urine Color     


 


Urine Appearance     (Clear)  


 


Urine pH     (5.0-8.0)  


 


Ur Specific Gravity     (1.001-1.035)  


 


Urine Protein     (Negative)  


 


Urine Glucose (UA)     (Negative)  


 


Urine Ketones     (Negative)  


 


Urine Blood     (Negative)  


 


Urine Nitrite     (Negative)  


 


Urine Bilirubin     (Negative)  


 


Urine Urobilinogen     (<2.0)  mg/dL


 


Ur Leukocyte Esterase     (Negative)  


 


Urine RBC     (0-5)  /hpf


 


Urine WBC     (0-5)  /hpf


 


Ur Squamous Epith Cells     (0-4)  /hpf


 


Urine Bacteria     (None)  /hpf


 


Urine Mucus     (None)  /hpf


 


Urine HCG, Qual     (Not Detectd)  














  19 Range/Units





  18:05 18:05 18:05 


 


WBC     (3.8-10.6)  k/uL


 


RBC     (3.80-5.40)  m/uL


 


Hgb     (11.4-16.0)  gm/dL


 


Hct     (34.0-46.0)  %


 


MCV     (80.0-100.0)  fL


 


MCH     (25.0-35.0)  pg


 


MCHC     (31.0-37.0)  g/dL


 


RDW     (11.5-15.5)  %


 


Plt Count     (150-450)  k/uL


 


Neutrophils %     %


 


Lymphocytes %     %


 


Monocytes %     %


 


Eosinophils %     %


 


Basophils %     %


 


Neutrophils #     (1.3-7.7)  k/uL


 


Lymphocytes #     (1.0-4.8)  k/uL


 


Monocytes #     (0-1.0)  k/uL


 


Eosinophils #     (0-0.7)  k/uL


 


Basophils #     (0-0.2)  k/uL


 


PT  9.5    (9.0-12.0)  sec


 


INR  0.9    (<1.2)  


 


APTT  22.3    (22.0-30.0)  sec


 


D-Dimer  0.39    (<0.60)  mg/L FEU


 


Sodium     (137-145)  mmol/L


 


Potassium     (3.5-5.1)  mmol/L


 


Chloride     ()  mmol/L


 


Carbon Dioxide     (22-30)  mmol/L


 


Anion Gap     mmol/L


 


BUN     (7-17)  mg/dL


 


Creatinine     (0.52-1.04)  mg/dL


 


Est GFR (CKD-EPI)AfAm     (>60 ml/min/1.73 sqM)  


 


Est GFR (CKD-EPI)NonAf     (>60 ml/min/1.73 sqM)  


 


Glucose     (74-99)  mg/dL


 


Calcium     (8.4-10.2)  mg/dL


 


Magnesium     (1.6-2.3)  mg/dL


 


Total Bilirubin     (0.2-1.3)  mg/dL


 


AST     (14-36)  U/L


 


ALT     (9-52)  U/L


 


Alkaline Phosphatase     ()  U/L


 


CK-MB (CK-2)     (0.0-2.4)  ng/mL


 


Troponin I     (0.000-0.034)  ng/mL


 


NT-Pro-B Natriuret Pep    147  pg/mL


 


Total Protein     (6.3-8.2)  g/dL


 


Albumin     (3.5-5.0)  g/dL


 


Amylase     ()  U/L


 


Lipase     ()  U/L


 


Urine Color     


 


Urine Appearance     (Clear)  


 


Urine pH     (5.0-8.0)  


 


Ur Specific Gravity     (1.001-1.035)  


 


Urine Protein     (Negative)  


 


Urine Glucose (UA)     (Negative)  


 


Urine Ketones     (Negative)  


 


Urine Blood     (Negative)  


 


Urine Nitrite     (Negative)  


 


Urine Bilirubin     (Negative)  


 


Urine Urobilinogen     (<2.0)  mg/dL


 


Ur Leukocyte Esterase     (Negative)  


 


Urine RBC     (0-5)  /hpf


 


Urine WBC     (0-5)  /hpf


 


Ur Squamous Epith Cells     (0-4)  /hpf


 


Urine Bacteria     (None)  /hpf


 


Urine Mucus     (None)  /hpf


 


Urine HCG, Qual   Not Detected   (Not Detectd)  














  19 Range/Units





  18:05 


 


WBC   (3.8-10.6)  k/uL


 


RBC   (3.80-5.40)  m/uL


 


Hgb   (11.4-16.0)  gm/dL


 


Hct   (34.0-46.0)  %


 


MCV   (80.0-100.0)  fL


 


MCH   (25.0-35.0)  pg


 


MCHC   (31.0-37.0)  g/dL


 


RDW   (11.5-15.5)  %


 


Plt Count   (150-450)  k/uL


 


Neutrophils %   %


 


Lymphocytes %   %


 


Monocytes %   %


 


Eosinophils %   %


 


Basophils %   %


 


Neutrophils #   (1.3-7.7)  k/uL


 


Lymphocytes #   (1.0-4.8)  k/uL


 


Monocytes #   (0-1.0)  k/uL


 


Eosinophils #   (0-0.7)  k/uL


 


Basophils #   (0-0.2)  k/uL


 


PT   (9.0-12.0)  sec


 


INR   (<1.2)  


 


APTT   (22.0-30.0)  sec


 


D-Dimer   (<0.60)  mg/L FEU


 


Sodium   (137-145)  mmol/L


 


Potassium   (3.5-5.1)  mmol/L


 


Chloride   ()  mmol/L


 


Carbon Dioxide   (22-30)  mmol/L


 


Anion Gap   mmol/L


 


BUN   (7-17)  mg/dL


 


Creatinine   (0.52-1.04)  mg/dL


 


Est GFR (CKD-EPI)AfAm   (>60 ml/min/1.73 sqM)  


 


Est GFR (CKD-EPI)NonAf   (>60 ml/min/1.73 sqM)  


 


Glucose   (74-99)  mg/dL


 


Calcium   (8.4-10.2)  mg/dL


 


Magnesium   (1.6-2.3)  mg/dL


 


Total Bilirubin   (0.2-1.3)  mg/dL


 


AST   (14-36)  U/L


 


ALT   (9-52)  U/L


 


Alkaline Phosphatase   ()  U/L


 


CK-MB (CK-2)   (0.0-2.4)  ng/mL


 


Troponin I   (0.000-0.034)  ng/mL


 


NT-Pro-B Natriuret Pep   pg/mL


 


Total Protein   (6.3-8.2)  g/dL


 


Albumin   (3.5-5.0)  g/dL


 


Amylase   ()  U/L


 


Lipase   ()  U/L


 


Urine Color  Yellow  


 


Urine Appearance  Clear  (Clear)  


 


Urine pH  6.0  (5.0-8.0)  


 


Ur Specific Gravity  1.016  (1.001-1.035)  


 


Urine Protein  Negative  (Negative)  


 


Urine Glucose (UA)  Negative  (Negative)  


 


Urine Ketones  Negative  (Negative)  


 


Urine Blood  Negative  (Negative)  


 


Urine Nitrite  Negative  (Negative)  


 


Urine Bilirubin  Negative  (Negative)  


 


Urine Urobilinogen  <2.0  (<2.0)  mg/dL


 


Ur Leukocyte Esterase  Trace H  (Negative)  


 


Urine RBC  4  (0-5)  /hpf


 


Urine WBC  2  (0-5)  /hpf


 


Ur Squamous Epith Cells  3  (0-4)  /hpf


 


Urine Bacteria  Rare H  (None)  /hpf


 


Urine Mucus  Rare H  (None)  /hpf


 


Urine HCG, Qual   (Not Detectd)  














Disposition


Is patient prescribed a controlled substance at d/c from ED?: No


Time of Disposition: 19:13





<Ismael Culver P - Last Filed: 19 19:33>





<Radha Rojas - Last Filed: 19 15:14>


Clinical Impression: 


 Chest pain





Disposition: ADMITTED AS IP TO THIS HOSP


Condition: Fair

## 2019-09-04 NOTE — XR
EXAMINATION TYPE: XR chest 2V

 

DATE OF EXAM: 9/4/2019

 

COMPARISON: 2/18/2019

 

HISTORY: Pain

 

TECHNIQUE:  Frontal and lateral views of the chest are obtained.

 

FINDINGS:  Heart and mediastinum are normal. Lungs are clear of infiltrate. There is no heart failure
. There are sternal wires. Costophrenic angles are clear. Bony thorax is intact.

 

IMPRESSION:  No active cardiopulmonary disease. No change.

## 2019-09-05 VITALS
HEART RATE: 80 BPM | SYSTOLIC BLOOD PRESSURE: 107 MMHG | TEMPERATURE: 97.4 F | RESPIRATION RATE: 16 BRPM | DIASTOLIC BLOOD PRESSURE: 74 MMHG

## 2019-09-05 LAB
CHOLEST SERPL-MCNC: 166 MG/DL (ref ?–200)
HDLC SERPL-MCNC: 35 MG/DL (ref 40–60)
LDLC SERPL CALC-MCNC: 100 MG/DL (ref 0–99)
TRIGL SERPL-MCNC: 156 MG/DL (ref ?–150)

## 2019-09-05 NOTE — P.HPIM
History of Present Illness








Diagnoses


Chest pain, coronary artery syndrome has been ruled out.  Completely resolved


Anxiety


History of coronary artery disease status post CABG


History of DVT/PE on Eliquis


Hyperlipidemia


Obesity








This is combined H&P and discharge summary








This is a pleasant 59 years old female with past medical history of coronary 

artery disease status post CABG and cardiac cath, DVT/PE on Eliquis, 

hyperlipidemia, who presents because of chest pain, which is completely resolved

now.


Patient has already been evaluated by cardiologist and they recommended acute 

coronary event has been ruled out, and her pain is reproducible and tender to 

palpation, could be related to her hernia at the bypass surgery site.  Also been

instructed patient to walk in the hallway and if she is doing well she can be 

discharged.  Patient is tolerated exertion with no chest pain or dyspnea.  She 

had back to her baseline.  Her chest pain is resolved like 0/10 in severity


Patient denies other symptoms.  She denies dyspnea.  No abdominal pain.  No 

nausea vomiting.  No change in urine or bowel habits.  No fever.  Patient feels 

she is ready to be discharged home


Patient was cleared for discharge by cardiology team


Problems and management plan were discussed with the patient and he verbalized 

understanding and acceptance


Patient was found stable and can be discharged home however he needs follow-up 

as an outpatient.  Patient was instructed to follow up with her PCP and 

cardiologist.  Patient agrees with the appointments made for her with Dr. Jc 

on 1619 at 9:30 AM and said she will follow up.  Patient wants to take her

on statin, and she is going to speak with Dr. Jc about it and if she needs to

change it.


Patient's wanted a note to go back to work on Monday which is provided for her. 

Patient instructed to follow with her PCP in one week for further instructions





Gen: patient is a AAOx3, no distress


CVS: S1-S2, RRR, no murmur


Lungs: B/L CTA, no wheezing


Abdomen: soft, no distention, no tenderness, positive bowel sounds


Extremity: no leg edema or induration





Time spent more than 35 minutes





Past Medical History


Past Medical History: Coronary Artery Disease (CAD), Chest Pain / Angina, Deep 

Vein Thrombosis (DVT), Hyperlipidemia, Myocardial Infarction (MI), Pulmonary 

Embolus (PE)


Last Myocardial Infarction Date:: 2018


History of Any Multi-Drug Resistant Organisms: MRSA


Date of last positivie culture/infection: 2015


MDRO Source:: abscess abdomen


Past Surgical History:  Section, Cholecystectomy, Coronary Bypass/CABG, 

Heart Catheterization


Additional Past Surgical History / Comment(s): right wrist surgery, carpal 

tunnel, MRSA S/P wound. left knee surgery


Past Anesthesia/Blood Transfusion Reactions: No Reported Reaction


Past Psychological History: No Psychological Hx Reported


Smoking Status: Current every day smoker


Past Alcohol Use History: None Reported


Past Drug Use History: None Reported





- Past Family History


  ** Mother


Family Medical History: Cancer, CVA/TIA, Diabetes Mellitus, Myocardial 

Infarction (MI), Renal Disease


Additional Family Medical History / Comment(s): uterine cancer, artificial 

valves





  ** Father


Additional Family Medical History / Comment(s): PAD





  ** Brother(s)


Family Medical History: Coronary Artery Disease (CAD), Diabetes Mellitus


Additional Family Medical History / Comment(s): 2 HEART STENTS, issue with heart

valve





  ** Sister(s)


Additional Family Medical History / Comment(s): psych issues





  ** Daughter(s)


Family Medical History: No Reported History





  ** Son(s)


Family Medical History: No Reported History





Medications and Allergies


                                Home Medications











 Medication  Instructions  Recorded  Confirmed  Type


 


Apixaban [Eliquis] 5 mg PO BID 10/30/18 09/04/19 History


 


Metoprolol Tartrate 25 mg PO BID 19 History


 


Nitroglycerin Sl Tabs [Nitrostat] 0.4 mg SUBLINGUAL Q5M PRN 19 

History








                                    Allergies











Allergy/AdvReac Type Severity Reaction Status Date / Time


 


adhesive Allergy  Rash/Hives Verified 19 17:52


 


albuterol Allergy  Anaphylaxis Verified 19 17:52


 


amoxicillin Allergy  Anaphylaxis Verified 19 17:52


 


ampicillin Allergy  Anaphylaxis Verified 19 17:52


 


azithromycin [From Zithromax] Allergy  Anaphylaxis Verified 19 17:52


 


erythromycin base Allergy  Anaphylaxis Verified 19 17:52


 


latex Allergy  Rash/Hives Verified 19 17:52


 


penicillin V Allergy  Anaphylaxis Verified 19 17:52














Physical Exam


Vitals: 


                                   Vital Signs











  Temp Pulse Pulse Resp BP BP BP


 


 19 07:30  97.4 F L   80  16    107/74


 


 19 04:00  97.9 F   67  17   121/81 


 


 19 00:30    69  17   


 


 19 00:00  97.7 F   69  17   123/80 


 


 19 23:00   72   18  114/71  


 


 19 19:46   83   18  127/94  


 


 19 18:28   73   18  149/83  


 


 19 17:41  98.5 F  87   18  141/87  














  Pulse Ox


 


 19 07:30  96


 


 19 04:00  93 L


 


 19 00:30 


 


 19 00:00  95


 


 19 23:00  95


 


 19 19:46  100


 


 19 18:28  99


 


 19 17:41  98








                                Intake and Output











 19





 22:59 06:59 14:59


 


Other:   


 


  Voiding Method  Toilet 


 


  # Voids  1 


 


  Weight 131.542 kg  














Results


CBC & Chem 7: 


                                 19 18:05





                                 19 18:05


Labs: 


                  Abnormal Lab Results - Last 24 Hours (Table)











  19 Range/Units





  18:05 18:05 05:27 


 


Glucose  111 H    (74-99)  mg/dL


 


Triglycerides    156 H  (<150)  mg/dL


 


LDL Cholesterol, Calc    100 H  (0-99)  mg/dL


 


HDL Cholesterol    35 L  (40-60)  mg/dL


 


Ur Leukocyte Esterase   Trace H   (Negative)  


 


Urine Bacteria   Rare H   (None)  /hpf


 


Urine Mucus   Rare H   (None)  /hpf














Thrombosis Risk Factor Assmnt





- Choose All That Apply


Any of the Below Risk Factors Present?: Yes


Each Factor Represents 1 point: Obesity (BMI >25), Swollen legs (current)


Other Risk Factors: Yes


Each Risk Factor Represents 3 Points: History of DVT/PE


Other congenital or acquired thrombophilia - If yes, enter type in comment: No


Thrombosis Risk Factor Assessment Total Risk Factor Score: 5


Thrombosis Risk Factor Assessment Level: High Risk

## 2019-09-05 NOTE — P.CRDCN
History of Present Illness


History of present illness: 


This is a pleasant 39-year-old  female past medical history significant

for coronary artery disease status post single-vessel bypass, DVT and PE 

maintained on long-term anticoagulation, dyslipidemia and hypertension.  She 

also unfortunately has a daily smoker.  She underwent bypass surgery in 2018. 

Initially she underwent a heart catheterization at [facility] revealing a

lesion in the mid LAD with evidence of intrinsic dissection.  Subsequently 

thereafter she went back to the hospital with significant rise in her troponin 

and had a total occlusion of the LAD requiring bypass with a LIMA to LAD.  

Postprocedure she developed DVT and PE with right ventricular enlargement She 

follows in the office with Dr. Jc.  We have been asked to see her in 

consultation secondary to chest discomfort.  She states yesterday while walking 

in to [facility] she felt a squeezing pain in the epigastric region.  This occurred

intermittently over the course of a few hours and was associated with difficulty

in taking a deep breath.  She also at one point yesterday felt mild nausea.  The

discomfort did not radiate to the arm, back, neck or jaw the symptoms are not 

exacerbated by activity or exertion.  There was no associated vomiting, 

diaphoresis, palpitations or dizziness.  She is seen and examined resting 

comfortably sitting up in bed.  She continues to have reproducible tenderness to

the epigastric region.





EKG reveals sinus mechanism heart rate of 79, nonspecific T-wave flattening 

noted.


Chest x-ray is negative for an acute cardiopulmonary process.


Laboratory data reviewed, WBC 9.8, hemoglobin 13.3, platelets 266, d-dimer 0.39,

sodium 139, potassium 4.4, creatinine 0.72, magnesium 1.9, cardiac enzymes 

negative 3,  and proBNP 147.


Current cardiac medications include Lopressor 25 mg twice a day and Eliquis 5 mg

twice a day.


Most recent echocardiogram obtained 2019 reveals preserved LV systolic 

function with ejection fraction 55-60% and mild pulmonary hypertension with an 

RVSP of 41 mmHg.





At the time of my exam:


CONSTITUTIONAL: Denies fever. Denies chills.


EYES: Denies blurred vision. Denies vision changes. Denies eye pain.


EARS, NOSE, MOUTH & THROAT: Denies headache. Denies sore throat. Denies ear 

pain.


CARDIOVASCULAR: Denies chest pain. Denies shortness of breath. Denies orthopnea.

Denies PND. Denies palpitations.


RESPIRATORY: Denies cough. 


GASTROINTESTINAL: Complains of reproducible epigastric tenderness.  Denies 

abdominal pain. Denies diarrhea. Denies constipation. Denies nausea. Denies 

vomiting.


MUSCULOSKELETAL: Denies myalgias.


INTEGUMENTARY: Denies pruitis. Denies rash.


NEUROLOGIC: Denies numbness. Denies tingling. Denies weakness.


PSYCHIATRIC: Denies anxiety. Denies depression.


ENDOCRINE: Denies fatigue. Denies weight change. Denies polydipsia. Denies 

polyurina.


GENITOURINARY: Denies burning, hematuria or urgency with micturation.


HEMATOLOGIC: Denies history of anemia. Denies bleeding. 





Blood pressure 107/74 heart rate 88 afebrile maintaining oxygen saturation on 

room air


GENERAL: This is a 39-year-old  female in no apparent distress at the 

time of my examination.  Morbidly obese.


HEENT: Head is atraumatic, normocephalic. Pupils are equal, round. Sclerae 

anicteric. Conjunctivae are clear. Mucous membranes of the mouth are moist. Neck

is supple. There is no jugular venous distention. No carotid bruit is heard.


LUNGS: Clear to auscultation no wheezes, rales or rhonchi. No chest wall 

tenderness is noted on palpation or with deep breathing.


HEART: Regular rate and rhythm without murmurs, rubs or gallops. S1 and S2 

heard.


ABDOMEN: Soft, nontender. Bowel sounds are heard. No organomegaly noted.


EXTREMITIES: No evidence of peripheral edema and no calf tenderness noted.


VASCULAR: Radial and dorsalis pedis pulses palpated, no evidence of clubbing.  


NEUROLOGIC: Patient is awake, alert and oriented x3.


 


ASSESSMENT


Chest pain, atypical for angina.  An acute coronary event has been ruled out.


History of coronary artery disease status post single-vessel bypass 2018 with a LIMA to LAD


Dyslipidemia


Hypertension


History of PE and DVT in the past maintained on long-term anticoagulation


Morbid obesity, BMI 51





PLAN


An acute coronary event has been ruled out.


Pain is reproducible and tender to palpation.  Patient does have a history of a 

hernia at the base of her incision from her bypass.  She struggles with pain in 

this area frequently.


Recommend getting up and walking around, increase activity.


Follow-up in the office with Dr. Jc in 2 weeks.


Recommend lifestyle modifications for lowering of LDL cholesterol as well as 

weight loss. Resume rosuvastatin on discharge.


Thank you kindly for this consultation.





Nurse Practitioner note has been reviewed, I agree with a documented findings 

and plan of care.  Patient was seen and examined.








Past Medical History


Past Medical History: Coronary Artery Disease (CAD), Chest Pain / Angina, Deep 

Vein Thrombosis (DVT), Hyperlipidemia, Myocardial Infarction (MI), Pulmonary 

Embolus (PE)


Last Myocardial Infarction Date:: 2018


History of Any Multi-Drug Resistant Organisms: MRSA


Date of last positivie culture/infection: 


MDRO Source:: abscess abdomen


Past Surgical History:  Section, Cholecystectomy, Coronary Bypass/CABG, 

Heart Catheterization


Additional Past Surgical History / Comment(s): right wrist surgery, carpal 

tunnel, MRSA S/P wound. left knee surgery


Past Anesthesia/Blood Transfusion Reactions: No Reported Reaction


Past Psychological History: No Psychological Hx Reported


Smoking Status: Current every day smoker


Past Alcohol Use History: None Reported


Past Drug Use History: None Reported





- Past Family History


  ** Mother


Family Medical History: Cancer, CVA/TIA, Diabetes Mellitus, Myocardial 

Infarction (MI), Renal Disease


Additional Family Medical History / Comment(s): uterine cancer, artificial 

valves





  ** Father


Additional Family Medical History / Comment(s): PAD





  ** Brother(s)


Family Medical History: Coronary Artery Disease (CAD), Diabetes Mellitus


Additional Family Medical History / Comment(s): 2 HEART STENTS, issue with heart

valve





  ** Sister(s)


Additional Family Medical History / Comment(s): psych issues





  ** Daughter(s)


Family Medical History: No Reported History





  ** Son(s)


Family Medical History: No Reported History





Medications and Allergies


                                Home Medications











 Medication  Instructions  Recorded  Confirmed  Type


 


Apixaban [Eliquis] 5 mg PO BID 10/30/18 09/04/19 History


 


Metoprolol Tartrate 25 mg PO BID 19 History


 


Nitroglycerin Sl Tabs [Nitrostat] 0.4 mg SUBLINGUAL Q5M PRN 19 

History








                                    Allergies











Allergy/AdvReac Type Severity Reaction Status Date / Time


 


adhesive Allergy  Rash/Hives Verified 19 17:52


 


albuterol Allergy  Anaphylaxis Verified 19 17:52


 


amoxicillin Allergy  Anaphylaxis Verified 19 17:52


 


ampicillin Allergy  Anaphylaxis Verified 19 17:52


 


azithromycin [From Zithromax] Allergy  Anaphylaxis Verified 19 17:52


 


erythromycin base Allergy  Anaphylaxis Verified 19 17:52


 


latex Allergy  Rash/Hives Verified 19 17:52


 


penicillin V Allergy  Anaphylaxis Verified 19 17:52














Physical Exam


Vitals: 


                                   Vital Signs











  Temp Pulse Pulse Resp BP BP Pulse Ox


 


 19 04:00  97.9 F   67  17   121/81  93 L


 


 19 00:30    69  17   


 


 19 00:00  97.7 F   69  17   123/80  95


 


 19 23:00   72   18  114/71   95


 


 19 19:46   83   18  127/94   100


 


 19 18:28   73   18  149/83   99


 


 19 17:41  98.5 F  87   18  141/87   98








                                Intake and Output











 19





 22:59 06:59 14:59


 


Other:   


 


  Voiding Method  Toilet 


 


  # Voids  1 


 


  Weight 131.542 kg  














Results





                                 19 18:05





                                 19 18:05


                                 Cardiac Enzymes











  19 Range/Units





  18:05 18:05 00:20 


 


AST  22    (14-36)  U/L


 


CK-MB (CK-2)   0.5   (0.0-2.4)  ng/mL


 


Troponin I   <0.012  0.018  (0.000-0.034)  ng/mL














  19 Range/Units





  05:51 


 


AST   (14-36)  U/L


 


CK-MB (CK-2)   (0.0-2.4)  ng/mL


 


Troponin I  0.030  (0.000-0.034)  ng/mL








                                   Coagulation











  19 Range/Units





  18:05 


 


PT  9.5  (9.0-12.0)  sec


 


APTT  22.3  (22.0-30.0)  sec








                                       CBC











  19 Range/Units





  18:05 


 


WBC  9.8  (3.8-10.6)  k/uL


 


RBC  4.69  (3.80-5.40)  m/uL


 


Hgb  13.3  (11.4-16.0)  gm/dL


 


Hct  41.6  (34.0-46.0)  %


 


Plt Count  266  (150-450)  k/uL








                          Comprehensive Metabolic Panel











  19 Range/Units





  18:05 


 


Sodium  139  (137-145)  mmol/L


 


Potassium  4.4  (3.5-5.1)  mmol/L


 


Chloride  105  ()  mmol/L


 


Carbon Dioxide  27  (22-30)  mmol/L


 


BUN  12  (7-17)  mg/dL


 


Creatinine  0.72  (0.52-1.04)  mg/dL


 


Glucose  111 H  (74-99)  mg/dL


 


Calcium  9.5  (8.4-10.2)  mg/dL


 


AST  22  (14-36)  U/L


 


ALT  16  (9-52)  U/L


 


Alkaline Phosphatase  76  ()  U/L


 


Total Protein  6.8  (6.3-8.2)  g/dL


 


Albumin  4.0  (3.5-5.0)  g/dL








                               Current Medications











Generic Name Dose Route Start Last Admin





  Trade Name Freq  PRN Reason Stop Dose Admin


 


Aspirin  325 mg  19 09:00 





  Aspirin  PO  





  DAILY CHON  


 


Nitroglycerin  0.4 mg  19 19:34 





  Nitrostat  SUBLINGUAL  





  Q5M PRN  





  Chest Pain  








                                Intake and Output











 19





 22:59 06:59 14:59


 


Other:   


 


  Voiding Method  Toilet 


 


  # Voids  1 


 


  Weight 131.542 kg  








                                        





                                 19 18:05 





                                 19 18:05

## 2019-10-23 ENCOUNTER — HOSPITAL ENCOUNTER (OUTPATIENT)
Dept: HOSPITAL 47 - EC | Age: 39
Setting detail: OBSERVATION
Discharge: LEFT BEFORE DISCHARGE | End: 2019-10-23
Attending: HOSPITALIST | Admitting: HOSPITALIST
Payer: COMMERCIAL

## 2019-10-23 VITALS — HEART RATE: 89 BPM | DIASTOLIC BLOOD PRESSURE: 71 MMHG | TEMPERATURE: 98.1 F | SYSTOLIC BLOOD PRESSURE: 125 MMHG

## 2019-10-23 VITALS — RESPIRATION RATE: 18 BRPM

## 2019-10-23 DIAGNOSIS — I25.2: ICD-10-CM

## 2019-10-23 DIAGNOSIS — E78.5: ICD-10-CM

## 2019-10-23 DIAGNOSIS — Z86.14: ICD-10-CM

## 2019-10-23 DIAGNOSIS — Z53.21: ICD-10-CM

## 2019-10-23 DIAGNOSIS — Z88.8: ICD-10-CM

## 2019-10-23 DIAGNOSIS — E66.9: ICD-10-CM

## 2019-10-23 DIAGNOSIS — Z91.19: ICD-10-CM

## 2019-10-23 DIAGNOSIS — R07.81: Primary | ICD-10-CM

## 2019-10-23 DIAGNOSIS — F17.200: ICD-10-CM

## 2019-10-23 DIAGNOSIS — I25.10: ICD-10-CM

## 2019-10-23 DIAGNOSIS — Z98.890: ICD-10-CM

## 2019-10-23 DIAGNOSIS — Z91.048: ICD-10-CM

## 2019-10-23 DIAGNOSIS — Z79.899: ICD-10-CM

## 2019-10-23 DIAGNOSIS — Z91.040: ICD-10-CM

## 2019-10-23 DIAGNOSIS — Z88.0: ICD-10-CM

## 2019-10-23 DIAGNOSIS — Z86.718: ICD-10-CM

## 2019-10-23 DIAGNOSIS — Z80.49: ICD-10-CM

## 2019-10-23 DIAGNOSIS — Z86.711: ICD-10-CM

## 2019-10-23 DIAGNOSIS — Z82.49: ICD-10-CM

## 2019-10-23 DIAGNOSIS — Z88.1: ICD-10-CM

## 2019-10-23 DIAGNOSIS — R94.31: ICD-10-CM

## 2019-10-23 DIAGNOSIS — I10: ICD-10-CM

## 2019-10-23 DIAGNOSIS — Z95.1: ICD-10-CM

## 2019-10-23 DIAGNOSIS — Z83.3: ICD-10-CM

## 2019-10-23 DIAGNOSIS — R79.89: ICD-10-CM

## 2019-10-23 DIAGNOSIS — Z90.49: ICD-10-CM

## 2019-10-23 DIAGNOSIS — Z79.01: ICD-10-CM

## 2019-10-23 LAB
ALBUMIN SERPL-MCNC: 4.1 G/DL (ref 3.5–5)
ALP SERPL-CCNC: 85 U/L (ref 38–126)
ALT SERPL-CCNC: 26 U/L (ref 9–52)
ANION GAP SERPL CALC-SCNC: 9 MMOL/L
APTT BLD: 22.4 SEC (ref 22–30)
AST SERPL-CCNC: 33 U/L (ref 14–36)
BASOPHILS # BLD AUTO: 0 K/UL (ref 0–0.2)
BASOPHILS NFR BLD AUTO: 1 %
BUN SERPL-SCNC: 9 MG/DL (ref 7–17)
CALCIUM SPEC-MCNC: 9.4 MG/DL (ref 8.4–10.2)
CHLORIDE SERPL-SCNC: 105 MMOL/L (ref 98–107)
CO2 SERPL-SCNC: 26 MMOL/L (ref 22–30)
EOSINOPHIL # BLD AUTO: 0.2 K/UL (ref 0–0.7)
EOSINOPHIL NFR BLD AUTO: 2 %
ERYTHROCYTE [DISTWIDTH] IN BLOOD BY AUTOMATED COUNT: 4.62 M/UL (ref 3.8–5.4)
ERYTHROCYTE [DISTWIDTH] IN BLOOD: 13.1 % (ref 11.5–15.5)
GLUCOSE SERPL-MCNC: 94 MG/DL (ref 74–99)
HCT VFR BLD AUTO: 41.7 % (ref 34–46)
HGB BLD-MCNC: 13.5 GM/DL (ref 11.4–16)
INR PPP: 0.9 (ref ?–1.2)
LYMPHOCYTES # SPEC AUTO: 2.4 K/UL (ref 1–4.8)
LYMPHOCYTES NFR SPEC AUTO: 32 %
MAGNESIUM SPEC-SCNC: 2.1 MG/DL (ref 1.6–2.3)
MCH RBC QN AUTO: 29.2 PG (ref 25–35)
MCHC RBC AUTO-ENTMCNC: 32.4 G/DL (ref 31–37)
MCV RBC AUTO: 90.2 FL (ref 80–100)
MONOCYTES # BLD AUTO: 0.2 K/UL (ref 0–1)
MONOCYTES NFR BLD AUTO: 3 %
NEUTROPHILS # BLD AUTO: 4.6 K/UL (ref 1.3–7.7)
NEUTROPHILS NFR BLD AUTO: 61 %
PLATELET # BLD AUTO: 270 K/UL (ref 150–450)
POTASSIUM SERPL-SCNC: 4.1 MMOL/L (ref 3.5–5.1)
PROT SERPL-MCNC: 7.2 G/DL (ref 6.3–8.2)
PT BLD: 9.5 SEC (ref 9–12)
SODIUM SERPL-SCNC: 140 MMOL/L (ref 137–145)
WBC # BLD AUTO: 7.5 K/UL (ref 3.8–10.6)

## 2019-10-23 PROCEDURE — 84484 ASSAY OF TROPONIN QUANT: CPT

## 2019-10-23 PROCEDURE — 80053 COMPREHEN METABOLIC PANEL: CPT

## 2019-10-23 PROCEDURE — 71046 X-RAY EXAM CHEST 2 VIEWS: CPT

## 2019-10-23 PROCEDURE — 93005 ELECTROCARDIOGRAM TRACING: CPT

## 2019-10-23 PROCEDURE — 96376 TX/PRO/DX INJ SAME DRUG ADON: CPT

## 2019-10-23 PROCEDURE — 85025 COMPLETE CBC W/AUTO DIFF WBC: CPT

## 2019-10-23 PROCEDURE — 85610 PROTHROMBIN TIME: CPT

## 2019-10-23 PROCEDURE — 83735 ASSAY OF MAGNESIUM: CPT

## 2019-10-23 PROCEDURE — 36415 COLL VENOUS BLD VENIPUNCTURE: CPT

## 2019-10-23 PROCEDURE — 96365 THER/PROPH/DIAG IV INF INIT: CPT

## 2019-10-23 PROCEDURE — 85730 THROMBOPLASTIN TIME PARTIAL: CPT

## 2019-10-23 PROCEDURE — 99285 EMERGENCY DEPT VISIT HI MDM: CPT

## 2019-10-23 NOTE — ED
General Adult HPI





- General


Source: patient, EMS, RN notes reviewed, old records reviewed


Mode of arrival: EMS


Limitations: no limitations





<Nighat Bright - Last Filed: 10/23/19 15:47>





<Bobby Adams - Last Filed: 10/23/19 16:05>





- General


Chief complaint: Recheck/Abnormal Lab/Rx


Stated complaint: Chest pain


Time Seen by Provider: 10/23/19 14:21





- History of Present Illness


Initial comments: 


Patient is a 39-year-old female with history of CABG, hypertension..  She 

presents emergency room today for a transfer from [facility].  Patient 

was evaluated there for left-sided chest pain, pleurisy.  Patient had a full 

evaluation including blood work, EKGs.  Laboratory studies were reassuring and 

her initial troponin was negative.  They did repeat patient's troponin 2 hours 

later and it was noted to be elevated at 0.032.  She has reportedly had a 

history of PEs and DVTs.  And has been noncompliant with her L [place].  They did 

check a d-dimer on the Patient.  This was elevated and a computed tomography s

can of the chest was used done to rule out PE.  There was no evidence of PE or 

pneumonia per the radiologist on patient's CAT scan.  With the elevated troponin

and her 2 hour draw Patient was transferred to our hospital.  Patient reports 

her cardiologist is Dr. Jc.  Patient states that she continues to have some 

pain upon arriving here.  She was not initiated on heparin at [facility].


 (Nighat Bright)





- Related Data


                                Home Medications











 Medication  Instructions  Recorded  Confirmed


 


Apixaban [Eliquis] 5 mg PO BID 10/30/18 10/23/19


 


Metoprolol Tartrate 25 mg PO BID 02/18/19 10/23/19


 


Ranitidine HCl [Zantac] 150 mg PO HS 10/23/19 10/23/19


 


Rosuvastatin Calcium [Crestor] 5 mg PO DAILY 10/23/19 10/23/19











                                    Allergies











Allergy/AdvReac Type Severity Reaction Status Date / Time


 


adhesive Allergy  Rash/Hives Verified 10/23/19 14:56


 


albuterol Allergy  Anaphylaxis Verified 10/23/19 14:56


 


amoxicillin Allergy  Anaphylaxis Verified 10/23/19 14:56


 


ampicillin Allergy  Anaphylaxis Verified 10/23/19 14:56


 


azithromycin [From Zithromax] Allergy  Anaphylaxis Verified 10/23/19 14:56


 


erythromycin base Allergy  Anaphylaxis Verified 10/23/19 14:56


 


latex Allergy  Rash/Hives Verified 10/23/19 14:56


 


penicillin V Allergy  Anaphylaxis Verified 10/23/19 14:56














Review of Systems


ROS Other: All systems not noted in ROS Statement are negative.





<Nighat Bright - Last Filed: 10/23/19 15:47>


ROS Other: All systems not noted in ROS Statement are negative.





<Bobby Adams - Last Filed: 10/23/19 16:05>


ROS Statement: 


Those systems with pertinent positive or pertinent negative responses have been 

documented in the HPI.








Past Medical History


Past Medical History: Coronary Artery Disease (CAD), Chest Pain / Angina, Deep 

Vein Thrombosis (DVT), Hyperlipidemia, Myocardial Infarction (MI), Pulmonary 

Embolus (PE)


Last Myocardial Infarction Date:: 2018


History of Any Multi-Drug Resistant Organisms: MRSA


Date of last positivie culture/infection: 


MDRO Source:: abscess abdomen


Past Surgical History:  Section, Cholecystectomy, Coronary Bypass/CABG, 

Heart Catheterization


Additional Past Surgical History / Comment(s): right wrist surgery, carpal 

tunnel, MRSA S/P wound. left knee surgery


Past Anesthesia/Blood Transfusion Reactions: No Reported Reaction


Past Psychological History: No Psychological Hx Reported


Smoking Status: Current every day smoker


Past Alcohol Use History: None Reported


Past Drug Use History: None Reported





- Past Family History


  ** Mother


Family Medical History: Cancer, CVA/TIA, Diabetes Mellitus, Myocardial 

Infarction (MI), Renal Disease


Additional Family Medical History / Comment(s): uterine cancer, artificial 

valves





  ** Father


Additional Family Medical History / Comment(s): PAD





  ** Brother(s)


Family Medical History: Coronary Artery Disease (CAD), Diabetes Mellitus


Additional Family Medical History / Comment(s): 2 HEART STENTS, issue with heart

valve





  ** Sister(s)


Additional Family Medical History / Comment(s): psych issues





  ** Daughter(s)


Family Medical History: No Reported History





  ** Son(s)


Family Medical History: No Reported History





<Nihgat Bright - Last Filed: 10/23/19 15:47>





General Exam


Limitations: no limitations


Head exam: Present: atraumatic, normocephalic, normal inspection


Eye exam: Present: normal appearance, PERRL, EOMI.  Absent: scleral icterus, con

junctival injection, periorbital swelling


ENT exam: Present: normal exam, mucous membranes moist


Neck exam: Present: normal inspection.  Absent: tenderness, meningismus, 

lymphadenopathy


Respiratory exam: Present: normal lung sounds bilaterally.  Absent: respiratory 

distress, wheezes, rales, rhonchi, stridor


Cardiovascular Exam: Present: regular rate, normal rhythm, normal heart sounds. 

Absent: systolic murmur, diastolic murmur, rubs, gallop, clicks


GI/Abdominal exam: Present: soft, normal bowel sounds.  Absent: distended, 

tenderness, guarding, rebound, rigid


Extremities exam: Present: normal inspection, full ROM, normal capillary refill,

other (Patient has evidence of Candida intertrigo within her right axilla.).  

Absent: tenderness, pedal edema, joint swelling, calf tenderness


Back exam: Present: normal inspection


Neurological exam: Present: alert, oriented X3, CN II-XII intact


Psychiatric exam: Present: normal affect, normal mood


Skin exam: Present: warm, dry, intact, normal color.  Absent: rash





<Nighat Bright - Last Filed: 10/23/19 15:47>





- General Exam Comments


Initial Comments: 





Alert and oriented 39-year-old female.  Patient is currently obese.  Patient 

appears in no significant distress. (Nighat Bright)





Course





                                   Vital Signs











  10/23/19 10/23/19





  14:08 16:00


 


Temperature 97.6 F 


 


Pulse Rate 78 91


 


Respiratory 20 18





Rate  


 


Blood Pressure 114/80 111/72


 


O2 Sat by Pulse 100 98





Oximetry  














Medical Decision Making





- Lab Data


Result diagrams: 


                                 10/23/19 15:16





                                 10/23/19 15:16





- Radiology Data


Radiology results: report reviewed





<Nighat Bright - Last Filed: 10/23/19 15:47>





- Lab Data


Result diagrams: 


                                 10/23/19 15:16





                                 10/23/19 15:16





<Bobby Adams - Last Filed: 10/23/19 16:05>





- Medical Decision Making





Patient 39-year-old female, sent here for transfer for [facility] for 

concern for elevated second troponin.  Patient went to the ER for some left-

sided pleuritic description of chest pain.  She had a CT which was negative for 

PE.  She has been noncompliant with her  Eliquis.   When she arrived here 

Patient was initiated on heparin, she was given aspirin and morphine earlier 

today.  Patient's EKG shows no significant acute changes.  Her cardiologist is 

Dr. Jc.  I discussed the Patient will have repeat troponins and admitted for 

cardiac observation. (Nighat Bright)


The patient is seen and examined.  All diagnostics are reviewed.  The case is 

discussed with the PA and I agree with findings as documented.  Case also was 

discussed with Dr. Fernandez and he is agreeable with admission. (Bobby Adams)





- Lab Data





                                   Lab Results











  10/23/19 10/23/19 10/23/19 Range/Units





  15:16 15:16 15:16 


 


WBC  7.5    (3.8-10.6)  k/uL


 


RBC  4.62    (3.80-5.40)  m/uL


 


Hgb  13.5    (11.4-16.0)  gm/dL


 


Hct  41.7    (34.0-46.0)  %


 


MCV  90.2    (80.0-100.0)  fL


 


MCH  29.2    (25.0-35.0)  pg


 


MCHC  32.4    (31.0-37.0)  g/dL


 


RDW  13.1    (11.5-15.5)  %


 


Plt Count  270    (150-450)  k/uL


 


Neutrophils %  61    %


 


Lymphocytes %  32    %


 


Monocytes %  3    %


 


Eosinophils %  2    %


 


Basophils %  1    %


 


Neutrophils #  4.6    (1.3-7.7)  k/uL


 


Lymphocytes #  2.4    (1.0-4.8)  k/uL


 


Monocytes #  0.2    (0-1.0)  k/uL


 


Eosinophils #  0.2    (0-0.7)  k/uL


 


Basophils #  0.0    (0-0.2)  k/uL


 


PT    9.5  (9.0-12.0)  sec


 


INR    0.9  (<1.2)  


 


APTT    22.4  (22.0-30.0)  sec


 


Sodium   140   (137-145)  mmol/L


 


Potassium   4.1   (3.5-5.1)  mmol/L


 


Chloride   105   ()  mmol/L


 


Carbon Dioxide   26   (22-30)  mmol/L


 


Anion Gap   9   mmol/L


 


BUN   9   (7-17)  mg/dL


 


Creatinine   0.62   (0.52-1.04)  mg/dL


 


Est GFR (CKD-EPI)AfAm   >90   (>60 ml/min/1.73 sqM)  


 


Est GFR (CKD-EPI)NonAf   >90   (>60 ml/min/1.73 sqM)  


 


Glucose   94   (74-99)  mg/dL


 


Calcium   9.4   (8.4-10.2)  mg/dL


 


Magnesium   2.1   (1.6-2.3)  mg/dL


 


Total Bilirubin   0.6   (0.2-1.3)  mg/dL


 


AST   33   (14-36)  U/L


 


ALT   26   (9-52)  U/L


 


Alkaline Phosphatase   85   ()  U/L


 


Troponin I     (0.000-0.034)  ng/mL


 


Total Protein   7.2   (6.3-8.2)  g/dL


 


Albumin   4.1   (3.5-5.0)  g/dL














  10/23/19 Range/Units





  15:16 


 


WBC   (3.8-10.6)  k/uL


 


RBC   (3.80-5.40)  m/uL


 


Hgb   (11.4-16.0)  gm/dL


 


Hct   (34.0-46.0)  %


 


MCV   (80.0-100.0)  fL


 


MCH   (25.0-35.0)  pg


 


MCHC   (31.0-37.0)  g/dL


 


RDW   (11.5-15.5)  %


 


Plt Count   (150-450)  k/uL


 


Neutrophils %   %


 


Lymphocytes %   %


 


Monocytes %   %


 


Eosinophils %   %


 


Basophils %   %


 


Neutrophils #   (1.3-7.7)  k/uL


 


Lymphocytes #   (1.0-4.8)  k/uL


 


Monocytes #   (0-1.0)  k/uL


 


Eosinophils #   (0-0.7)  k/uL


 


Basophils #   (0-0.2)  k/uL


 


PT   (9.0-12.0)  sec


 


INR   (<1.2)  


 


APTT   (22.0-30.0)  sec


 


Sodium   (137-145)  mmol/L


 


Potassium   (3.5-5.1)  mmol/L


 


Chloride   ()  mmol/L


 


Carbon Dioxide   (22-30)  mmol/L


 


Anion Gap   mmol/L


 


BUN   (7-17)  mg/dL


 


Creatinine   (0.52-1.04)  mg/dL


 


Est GFR (CKD-EPI)AfAm   (>60 ml/min/1.73 sqM)  


 


Est GFR (CKD-EPI)NonAf   (>60 ml/min/1.73 sqM)  


 


Glucose   (74-99)  mg/dL


 


Calcium   (8.4-10.2)  mg/dL


 


Magnesium   (1.6-2.3)  mg/dL


 


Total Bilirubin   (0.2-1.3)  mg/dL


 


AST   (14-36)  U/L


 


ALT   (9-52)  U/L


 


Alkaline Phosphatase   ()  U/L


 


Troponin I  0.013  (0.000-0.034)  ng/mL


 


Total Protein   (6.3-8.2)  g/dL


 


Albumin   (3.5-5.0)  g/dL














10/23/19 15:56





EKG performed here 15 days 47 shows normal sinus rhythm low voltage QRS.  Cannot

rule out anterior infarct age undetermined.  Abnormal EKG.  Ventricular rate of 

84 bpm..  Intervals 152 ms.  QRS duration is 82 ms.  QT QTc is 370/446 ms.


 (Nighat Bright)





- Radiology Data





Chest x-ray shows prior median sternotomy.  Heart size is upper limits of 

normal.  Limited exam enlarged Patient by habitus.  Correlate for mild pulmonary

vascular congestion.





CT is negative for pulmonary embolism at this time. (Nighat Bright)





Disposition


Is patient prescribed a controlled substance at d/c from ED?: No


Time of Disposition: 16:01





<Nighat Bright - Last Filed: 10/23/19 15:47>





<Bobby Adams - Last Filed: 10/23/19 16:05>


Clinical Impression: 


 Elevated troponin, Chest pain





Disposition: ADMITTED AS IP TO THIS HOSP


Condition: Stable


Additional Instructions: 


Please use medication as discussed. Please follow up with family doctor if 

symptoms have not improved over the next two days. Please return to the 

emergency room if your symptoms increase or worsen or for any other concerns.


Referrals: 


Katy Tay MD [Primary Care Provider] - 1-2 days

## 2019-10-23 NOTE — XR
EXAMINATION TYPE: XR chest 2V

 

DATE OF EXAM: 10/23/2019

 

COMPARISON: Prior chest x-ray 9/4/2019, CTA chest 10/23/2019

 

HISTORY: Chest pain

 

TECHNIQUE:  Frontal and lateral views of the chest are obtained.

 

FINDINGS: Patient is post median sternotomy. Technique is somewhat apical lordotic. There is no focal
 air space opacity, pleural effusion, or pneumothorax seen.  The cardiac silhouette size is stable.  
 There are overlying cardiac leads. Prominent lung volumes are present, there is increased AP diamete
r chest. Patient is post cholecystectomy. The osseous structures are intact.

 

IMPRESSION:  No acute cardiopulmonary process.

## 2019-10-24 ENCOUNTER — HOSPITAL ENCOUNTER (OUTPATIENT)
Dept: HOSPITAL 47 - EC | Age: 39
Setting detail: OBSERVATION
LOS: 1 days | Discharge: HOME | End: 2019-10-25
Attending: HOSPITALIST | Admitting: HOSPITALIST
Payer: COMMERCIAL

## 2019-10-24 DIAGNOSIS — I82.532: ICD-10-CM

## 2019-10-24 DIAGNOSIS — E78.5: ICD-10-CM

## 2019-10-24 DIAGNOSIS — R07.1: Primary | ICD-10-CM

## 2019-10-24 DIAGNOSIS — R22.41: ICD-10-CM

## 2019-10-24 DIAGNOSIS — Z82.49: ICD-10-CM

## 2019-10-24 DIAGNOSIS — Z84.1: ICD-10-CM

## 2019-10-24 DIAGNOSIS — Z80.49: ICD-10-CM

## 2019-10-24 DIAGNOSIS — Z79.01: ICD-10-CM

## 2019-10-24 DIAGNOSIS — Z79.899: ICD-10-CM

## 2019-10-24 DIAGNOSIS — Z81.8: ICD-10-CM

## 2019-10-24 DIAGNOSIS — Z88.1: ICD-10-CM

## 2019-10-24 DIAGNOSIS — I10: ICD-10-CM

## 2019-10-24 DIAGNOSIS — R79.89: ICD-10-CM

## 2019-10-24 DIAGNOSIS — R06.02: ICD-10-CM

## 2019-10-24 DIAGNOSIS — I82.432: ICD-10-CM

## 2019-10-24 DIAGNOSIS — Z86.711: ICD-10-CM

## 2019-10-24 DIAGNOSIS — F17.200: ICD-10-CM

## 2019-10-24 DIAGNOSIS — Z88.0: ICD-10-CM

## 2019-10-24 DIAGNOSIS — Z91.048: ICD-10-CM

## 2019-10-24 DIAGNOSIS — I25.10: ICD-10-CM

## 2019-10-24 DIAGNOSIS — Z88.8: ICD-10-CM

## 2019-10-24 DIAGNOSIS — E66.01: ICD-10-CM

## 2019-10-24 DIAGNOSIS — Z91.14: ICD-10-CM

## 2019-10-24 DIAGNOSIS — I25.2: ICD-10-CM

## 2019-10-24 DIAGNOSIS — Z83.3: ICD-10-CM

## 2019-10-24 DIAGNOSIS — Z82.3: ICD-10-CM

## 2019-10-24 DIAGNOSIS — Z90.49: ICD-10-CM

## 2019-10-24 DIAGNOSIS — Z91.040: ICD-10-CM

## 2019-10-24 DIAGNOSIS — Z86.14: ICD-10-CM

## 2019-10-24 DIAGNOSIS — Z95.1: ICD-10-CM

## 2019-10-24 DIAGNOSIS — R51: ICD-10-CM

## 2019-10-24 LAB
ALBUMIN SERPL-MCNC: 4 G/DL (ref 3.5–5)
ALP SERPL-CCNC: 81 U/L (ref 38–126)
ALT SERPL-CCNC: 21 U/L (ref 9–52)
ANION GAP SERPL CALC-SCNC: 7 MMOL/L
APTT BLD: 24.1 SEC (ref 22–30)
AST SERPL-CCNC: 17 U/L (ref 14–36)
BASOPHILS # BLD AUTO: 0.1 K/UL (ref 0–0.2)
BASOPHILS NFR BLD AUTO: 1 %
BUN SERPL-SCNC: 16 MG/DL (ref 7–17)
CALCIUM SPEC-MCNC: 9.5 MG/DL (ref 8.4–10.2)
CHLORIDE SERPL-SCNC: 102 MMOL/L (ref 98–107)
CO2 SERPL-SCNC: 30 MMOL/L (ref 22–30)
D DIMER PPP FEU-MCNC: 1.08 MG/L FEU (ref ?–0.6)
EOSINOPHIL # BLD AUTO: 0.1 K/UL (ref 0–0.7)
EOSINOPHIL NFR BLD AUTO: 2 %
ERYTHROCYTE [DISTWIDTH] IN BLOOD BY AUTOMATED COUNT: 4.56 M/UL (ref 3.8–5.4)
ERYTHROCYTE [DISTWIDTH] IN BLOOD: 13 % (ref 11.5–15.5)
GLUCOSE SERPL-MCNC: 99 MG/DL (ref 74–99)
HCT VFR BLD AUTO: 40.6 % (ref 34–46)
HGB BLD-MCNC: 13.7 GM/DL (ref 11.4–16)
INR PPP: 0.9 (ref ?–1.2)
LYMPHOCYTES # SPEC AUTO: 2.8 K/UL (ref 1–4.8)
LYMPHOCYTES NFR SPEC AUTO: 31 %
MAGNESIUM SPEC-SCNC: 2.1 MG/DL (ref 1.6–2.3)
MCH RBC QN AUTO: 30 PG (ref 25–35)
MCHC RBC AUTO-ENTMCNC: 33.6 G/DL (ref 31–37)
MCV RBC AUTO: 89.2 FL (ref 80–100)
MONOCYTES # BLD AUTO: 0.4 K/UL (ref 0–1)
MONOCYTES NFR BLD AUTO: 4 %
NEUTROPHILS # BLD AUTO: 5.5 K/UL (ref 1.3–7.7)
NEUTROPHILS NFR BLD AUTO: 61 %
PLATELET # BLD AUTO: 322 K/UL (ref 150–450)
POTASSIUM SERPL-SCNC: 4.1 MMOL/L (ref 3.5–5.1)
PROT SERPL-MCNC: 7 G/DL (ref 6.3–8.2)
PT BLD: 9.4 SEC (ref 9–12)
SODIUM SERPL-SCNC: 139 MMOL/L (ref 137–145)
WBC # BLD AUTO: 9 K/UL (ref 3.8–10.6)

## 2019-10-24 PROCEDURE — 93970 EXTREMITY STUDY: CPT

## 2019-10-24 PROCEDURE — 71046 X-RAY EXAM CHEST 2 VIEWS: CPT

## 2019-10-24 PROCEDURE — 99285 EMERGENCY DEPT VISIT HI MDM: CPT

## 2019-10-24 PROCEDURE — 85379 FIBRIN DEGRADATION QUANT: CPT

## 2019-10-24 PROCEDURE — 84484 ASSAY OF TROPONIN QUANT: CPT

## 2019-10-24 PROCEDURE — 85610 PROTHROMBIN TIME: CPT

## 2019-10-24 PROCEDURE — 93306 TTE W/DOPPLER COMPLETE: CPT

## 2019-10-24 PROCEDURE — 85025 COMPLETE CBC W/AUTO DIFF WBC: CPT

## 2019-10-24 PROCEDURE — 93005 ELECTROCARDIOGRAM TRACING: CPT

## 2019-10-24 PROCEDURE — 85730 THROMBOPLASTIN TIME PARTIAL: CPT

## 2019-10-24 PROCEDURE — 96374 THER/PROPH/DIAG INJ IV PUSH: CPT

## 2019-10-24 PROCEDURE — 80053 COMPREHEN METABOLIC PANEL: CPT

## 2019-10-24 PROCEDURE — 96375 TX/PRO/DX INJ NEW DRUG ADDON: CPT

## 2019-10-24 PROCEDURE — 83735 ASSAY OF MAGNESIUM: CPT

## 2019-10-24 PROCEDURE — 36415 COLL VENOUS BLD VENIPUNCTURE: CPT

## 2019-10-24 NOTE — ED
General Adult HPI





- General


Source: patient, RN notes reviewed, old records reviewed


Mode of arrival: ambulatory


Limitations: no limitations





<Max Leung - Last Filed: 10/25/19 00:20>





<Yang Stack - Last Filed: 10/29/19 09:54>





- General


Chief complaint: Chest Pain


Stated complaint: Headache,Chest Pain with deep breath


Time Seen by Provider: 10/24/19 20:52





- History of Present Illness


Initial comments: 


39-year-old female patient presents to the chief complaint of chest pain.  

Patient reports that she has pain in her left region of her breast.  Describes 

it nature.  Patient reports that she has been having some coughing as well.  

Patient has a history of CABG, prior pulmonary embolism and DVTs.  Patient is 

post anticoagulated on eliquis hours noncompliant with her medication.  She was 

seen in this facility yesterday and recommended admission, signed out AGAINST 

MEDICAL ADVICE.  Patient was evaluated in [place] yesterday where she had a 

reported CT pulmonary angiography which was negative.  States that her symptoms 

have continued.  She does complain of some shortness of breath.  Denies any rece

nt prolonged travel or exogenous hormone use.





Systemic: Pt denies fatigue, fever/chills, rash. Pt denies weakness, night 

sweats, weight loss. 


Neuro: Pt denies headache, visual disturbances, syncope or pre-syncope.


HEENT: Pt denies ocular discharge or irritation, otalgia, rhinorrhea, 

pharyngitis or notable lymphadenopathy. 


Cardiopulmonary: Pt denies heart palpitations, dyspnea on exertion.  


Abdominal/GI: Pt denies abdominal pain, n/v/d. 


: Pt denies dysuria, burning w/ urination, frequency/urgency. Denies new onset

urinary or bowel incontinence.  


MSK: Pt denies myalgia, loss of strength or function in extremities. 


Neuro: Pt denies new onset weakness, paresthesias. 


 (Max Leung)





- Related Data


                                Home Medications











 Medication  Instructions  Recorded  Confirmed


 


Apixaban [Eliquis] 5 mg PO BID 10/30/18 10/25/19


 


Metoprolol Tartrate 25 mg PO BID 02/18/19 10/25/19


 


Ranitidine HCl [Zantac] 150 mg PO HS 10/23/19 10/25/19


 


Rosuvastatin Calcium [Crestor] 5 mg PO DAILY 10/23/19 10/25/19











                                    Allergies











Allergy/AdvReac Type Severity Reaction Status Date / Time


 


adhesive Allergy  Rash/Hives Verified 10/25/19 07:53


 


albuterol Allergy  Anaphylaxis Verified 10/25/19 07:53


 


amoxicillin Allergy  Anaphylaxis Verified 10/25/19 07:53


 


ampicillin Allergy  Anaphylaxis Verified 10/25/19 07:53


 


azithromycin [From Zithromax] Allergy  Anaphylaxis Verified 10/25/19 07:53


 


erythromycin base Allergy  Anaphylaxis Verified 10/25/19 07:53


 


latex Allergy  Rash/Hives Verified 10/25/19 07:53


 


penicillin V Allergy  Anaphylaxis Verified 10/25/19 07:53














Review of Systems


ROS Other: All systems not noted in ROS Statement are negative.





<Max Leung - Last Filed: 10/25/19 00:20>


ROS Other: All systems not noted in ROS Statement are negative.





<Yang Stack - Last Filed: 10/29/19 09:54>


ROS Statement: 


Those systems with pertinent positive or pertinent negative responses have been 

documented in the HPI.








Past Medical History


Past Medical History: Coronary Artery Disease (CAD), Chest Pain / Angina, Deep 

Vein Thrombosis (DVT), Hyperlipidemia, Myocardial Infarction (MI), Pulmonary 

Embolus (PE)


Last Myocardial Infarction Date:: 2018


History of Any Multi-Drug Resistant Organisms: MRSA


Date of last positivie culture/infection: 


MDRO Source:: abscess abdomen


Past Surgical History:  Section, Cholecystectomy, Coronary Bypass/CABG, 

Heart Catheterization


Additional Past Surgical History / Comment(s): right wrist surgery, carpal 

tunnel, MRSA S/P wound. left knee surgery


Past Anesthesia/Blood Transfusion Reactions: No Reported Reaction


Past Psychological History: No Psychological Hx Reported


Smoking Status: Current every day smoker


Past Alcohol Use History: None Reported


Past Drug Use History: None Reported





- Past Family History


  ** Mother


Family Medical History: Cancer, CVA/TIA, Diabetes Mellitus, Myocardial 

Infarction (MI), Renal Disease


Additional Family Medical History / Comment(s): uterine cancer, artificial 

valves





  ** Father


Additional Family Medical History / Comment(s): PAD





  ** Brother(s)


Family Medical History: Coronary Artery Disease (CAD), Diabetes Mellitus


Additional Family Medical History / Comment(s): 2 HEART STENTS, issue with heart

valve





  ** Sister(s)


Additional Family Medical History / Comment(s): psych issues





  ** Daughter(s)


Family Medical History: No Reported History





  ** Son(s)


Family Medical History: No Reported History





<MadhuMax SOUTH - Last Filed: 10/25/19 00:20>





General Exam


Limitations: no limitations





<MadhuMax DIA - Last Filed: 10/25/19 00:20>





- General Exam Comments


Initial Comments: 





Constitutional: NAD, AOX3, Pt has pleasant affect. 


HEENT: NC/AT, trachea midline, neck supple, no lymphadenopathy. Posterior 

pharynx non erythematous, without exudates. External ears appear normal, without

discharge. Mucous membranes moist. Eyes PERRLA, EOM intact. There is no scleral 

icterus. No pallor noted. 


Cardiopulmonary: RRR, no murmurs, rubs or gallops, no JVD noted.  Chest.  Pain 

reproducible upon palpation.  Lungs CTAB in anterior and posterior fields. No 

peripheral edema. 


Abdominal exam: Abdomen soft and non-distended. Abdomen non-tender to palpation 

in all 4 quadrants. Bowel sounds active in LLQ. No hepatosplenomegaly. No 

ecchymosis


Neuro: CN II-XII grossly intact. No nuchal rigidity. No raccon eyes, no cortes 

sign, no hemotympanum. No cervical spinal tenderness. 


MSK: No posterior calf tenderness bilaterally, homans sign negative bilaterally.

Posterior tibialis and radial pulse +2 bilaterally. Sensation intact in upper 

and lower extremities. Full active ROM in upper and lower extremities, 5/5 

stregnth. 





 (Max Leung)





Course


                                   Vital Signs











  10/24/19 10/24/19 10/25/19





  20:35 21:58 00:53


 


Temperature 98.7 F  98.1 F


 


Pulse Rate 96  


 


Pulse Rate [   84





Pulse Oximetery   





]   


 


Respiratory 16 18 18





Rate   


 


Blood Pressure 110/71  


 


Blood Pressure   123/77





[Right Arm]   


 


O2 Sat by Pulse 98  97





Oximetry   














Medical Decision Making





- Lab Data


Result diagrams: 


                                 10/24/19 23:39





                                 10/24/19 21:35





- EKG Data


-: EKG Interpreted by Me (and Dr. Cordero )





<Max Leung - Last Filed: 10/25/19 00:20>





- Lab Data


Result diagrams: 


                                 10/25/19 06:18





                                 10/24/19 21:35





<Yang Stack - Last Filed: 10/29/19 09:54>





- Medical Decision Making





39-year-old female patient presents to the chief complaint of chest pain.  

Patient reports that she has pain in her left region of her breast.  Describes 

it nature.  Patient reports that she has been having some coughing as well.  

Patient has a history of CABG, prior pulmonary embolism and DVTs.  Patient is 

post anticoagulated on eliquis hours noncompliant with her medication.  She was 

seen in this facility yesterday and recommended admission, signed out AGAINST 

MEDICAL ADVICE.  Patient was evaluated in [place] yesterday where she had a 

reported CT pulmonary angiography which was negative.  States that her symptoms 

have continued.  She does complain of some shortness of breath.  Denies any 

recent prolonged travel or exogenous hormone use.  Patient will signs stable, 

afebrile.  Physical exam displayed reproducible chest pain.  Patient studies 

revealed negative troponin, elevated d-dimer.  Patient denies any chance of 

being pregnant.  Patient declines repeat CT. further history taking reveals the 

patient does have an extensive history of PE including 2 unprovoked PEs and 6 

DVTs.  Patient will be heparinized and admitted for cardiac evaluation.  Case 

discussed with Dr. Cordero. 


 (Max Leung)


I saw this patient in conjunction with the physician assistant.  I performed 

independent history and physical exam.  Agree with case management. 

(Yang Stack)





- Lab Data


                                   Lab Results











  10/24/19 10/24/19 10/24/19 Range/Units





  21:35 21:35 21:35 


 


WBC  9.0    (3.8-10.6)  k/uL


 


RBC  4.56    (3.80-5.40)  m/uL


 


Hgb  13.7    (11.4-16.0)  gm/dL


 


Hct  40.6    (34.0-46.0)  %


 


MCV  89.2    (80.0-100.0)  fL


 


MCH  30.0    (25.0-35.0)  pg


 


MCHC  33.6    (31.0-37.0)  g/dL


 


RDW  13.0    (11.5-15.5)  %


 


Plt Count  322    (150-450)  k/uL


 


Neutrophils %  61    %


 


Lymphocytes %  31    %


 


Monocytes %  4    %


 


Eosinophils %  2    %


 


Basophils %  1    %


 


Neutrophils #  5.5    (1.3-7.7)  k/uL


 


Lymphocytes #  2.8    (1.0-4.8)  k/uL


 


Monocytes #  0.4    (0-1.0)  k/uL


 


Eosinophils #  0.1    (0-0.7)  k/uL


 


Basophils #  0.1    (0-0.2)  k/uL


 


PT    9.4  (9.0-12.0)  sec


 


INR    0.9  (<1.2)  


 


APTT    24.1  (22.0-30.0)  sec


 


D-Dimer    1.08 H  (<0.60)  mg/L FEU


 


Sodium   139   (137-145)  mmol/L


 


Potassium   4.1   (3.5-5.1)  mmol/L


 


Chloride   102   ()  mmol/L


 


Carbon Dioxide   30   (22-30)  mmol/L


 


Anion Gap   7   mmol/L


 


BUN   16   (7-17)  mg/dL


 


Creatinine   0.98   (0.52-1.04)  mg/dL


 


Est GFR (CKD-EPI)AfAm   85   (>60 ml/min/1.73 sqM)  


 


Est GFR (CKD-EPI)NonAf   73   (>60 ml/min/1.73 sqM)  


 


Glucose   99   (74-99)  mg/dL


 


Calcium   9.5   (8.4-10.2)  mg/dL


 


Magnesium   2.1   (1.6-2.3)  mg/dL


 


Total Bilirubin   0.2   (0.2-1.3)  mg/dL


 


AST   17   (14-36)  U/L


 


ALT   21   (9-52)  U/L


 


Alkaline Phosphatase   81   ()  U/L


 


Troponin I     (0.000-0.034)  ng/mL


 


Total Protein   7.0   (6.3-8.2)  g/dL


 


Albumin   4.0   (3.5-5.0)  g/dL














  10/24/19 10/24/19 10/24/19 Range/Units





  21:35 23:39 23:39 


 


WBC   9.1   (3.8-10.6)  k/uL


 


RBC   4.66   (3.80-5.40)  m/uL


 


Hgb   13.8   (11.4-16.0)  gm/dL


 


Hct   41.9   (34.0-46.0)  %


 


MCV   89.7   (80.0-100.0)  fL


 


MCH   29.7   (25.0-35.0)  pg


 


MCHC   33.1   (31.0-37.0)  g/dL


 


RDW   13.0   (11.5-15.5)  %


 


Plt Count   326   (150-450)  k/uL


 


Neutrophils %   58   %


 


Lymphocytes %   35   %


 


Monocytes %   3   %


 


Eosinophils %   2   %


 


Basophils %   0   %


 


Neutrophils #   5.3   (1.3-7.7)  k/uL


 


Lymphocytes #   3.2   (1.0-4.8)  k/uL


 


Monocytes #   0.3   (0-1.0)  k/uL


 


Eosinophils #   0.2   (0-0.7)  k/uL


 


Basophils #   0.0   (0-0.2)  k/uL


 


PT    9.4  (9.0-12.0)  sec


 


INR    0.9  (<1.2)  


 


APTT    23.6  (22.0-30.0)  sec


 


D-Dimer     (<0.60)  mg/L FEU


 


Sodium     (137-145)  mmol/L


 


Potassium     (3.5-5.1)  mmol/L


 


Chloride     ()  mmol/L


 


Carbon Dioxide     (22-30)  mmol/L


 


Anion Gap     mmol/L


 


BUN     (7-17)  mg/dL


 


Creatinine     (0.52-1.04)  mg/dL


 


Est GFR (CKD-EPI)AfAm     (>60 ml/min/1.73 sqM)  


 


Est GFR (CKD-EPI)NonAf     (>60 ml/min/1.73 sqM)  


 


Glucose     (74-99)  mg/dL


 


Calcium     (8.4-10.2)  mg/dL


 


Magnesium     (1.6-2.3)  mg/dL


 


Total Bilirubin     (0.2-1.3)  mg/dL


 


AST     (14-36)  U/L


 


ALT     (9-52)  U/L


 


Alkaline Phosphatase     ()  U/L


 


Troponin I  <0.012    (0.000-0.034)  ng/mL


 


Total Protein     (6.3-8.2)  g/dL


 


Albumin     (3.5-5.0)  g/dL














- EKG Data


EKG Comments: 


Ventricular rate 87, painful 148, QRS 84, QT/ is 440.  Normal sinus 

rhythm, low voltage QRS [place] EKG, no significant change from prior.  No 

concern for acute ischemia.


 (Max Leung)





Disposition


Is patient prescribed a controlled substance at d/c from ED?: No





<Max Leung - Last Filed: 10/25/19 00:20>





<Yang Stack - Last Filed: 10/29/19 09:54>


Clinical Impression: 


 Chest pain





Disposition: ADMITTED AS IP TO THIS HOSP


Condition: Serious

## 2019-10-24 NOTE — US
EXAMINATION TYPE: US venous doppler duplex LE 

 

DATE OF EXAM: 10/24/2019 11:34 PM

 

COMPARISON: US 2019 

 

CLINICAL HISTORY: elevated dimer . Elevated D Dimer. Hx PE, DVT. 

 

SIDE PERFORMED: Bilateral  

 

TECHNIQUE:  The lower extremity deep venous system is examined utilizing real time linear array sonog
arnaldo with graded compression, doppler sonography and color-flow sonography.

 

VESSELS IMAGED:

 

Common Femoral Vein

Deep Femoral Vein

Greater Saphenous Vein *

Femoral Vein

Popliteal Vein

Small Saphenous Vein *

Proximal Calf Veins

(* superficial vessels)

 

 

 

Right Leg:  EIV not visualized. No evidence of DVT in veins imaged from prox calf veins to CFV/GSV. P
atient cannot tolerate compression of distal femoral vein.

 

Left Leg:  EIV not visualized. Patient cannot tolerate compression of distal femoral vein. There appe
ars to be non-occlusive chronic thrombus in the proximal-mid popliteal vein. Unable to assess krysta
sibility of proximal popliteal vein due to patient's pain tolerance. Mid and distal popliteal vein ap
pear compressible. 

 

 

 

IMPRESSION:  

Limited exam. There is evidence of acute and chronic deep venous thrombosis in the left popliteal vei
n. No evidence of deep venous thrombosis in the right leg.

## 2019-10-24 NOTE — XR
EXAMINATION TYPE: XR chest 2V

 

DATE OF EXAM: 10/24/2019

 

COMPARISON: 10/23/2019

 

HISTORY: Chest pain

 

TECHNIQUE:  Frontal and lateral views of the chest are obtained.

 

FINDINGS:  There is no heart failure nor confluent pneumonic infiltrate. Costophrenic angles are markus
r. There are sternal wires.

 

IMPRESSION:  No definite active cardiopulmonary disease. No change.

## 2019-10-25 VITALS — SYSTOLIC BLOOD PRESSURE: 106 MMHG | DIASTOLIC BLOOD PRESSURE: 67 MMHG | HEART RATE: 68 BPM | TEMPERATURE: 98 F

## 2019-10-25 VITALS — RESPIRATION RATE: 18 BRPM

## 2019-10-25 LAB
APTT BLD: 23.6 SEC (ref 22–30)
BASOPHILS # BLD AUTO: 0 K/UL (ref 0–0.2)
BASOPHILS # BLD AUTO: 0 K/UL (ref 0–0.2)
BASOPHILS NFR BLD AUTO: 0 %
BASOPHILS NFR BLD AUTO: 0 %
EOSINOPHIL # BLD AUTO: 0.2 K/UL (ref 0–0.7)
EOSINOPHIL # BLD AUTO: 0.2 K/UL (ref 0–0.7)
EOSINOPHIL NFR BLD AUTO: 2 %
EOSINOPHIL NFR BLD AUTO: 2 %
ERYTHROCYTE [DISTWIDTH] IN BLOOD BY AUTOMATED COUNT: 4.3 M/UL (ref 3.8–5.4)
ERYTHROCYTE [DISTWIDTH] IN BLOOD BY AUTOMATED COUNT: 4.66 M/UL (ref 3.8–5.4)
ERYTHROCYTE [DISTWIDTH] IN BLOOD: 13 % (ref 11.5–15.5)
ERYTHROCYTE [DISTWIDTH] IN BLOOD: 13 % (ref 11.5–15.5)
HCT VFR BLD AUTO: 38.9 % (ref 34–46)
HCT VFR BLD AUTO: 41.9 % (ref 34–46)
HGB BLD-MCNC: 12.9 GM/DL (ref 11.4–16)
HGB BLD-MCNC: 13.8 GM/DL (ref 11.4–16)
INR PPP: 0.9 (ref ?–1.2)
LYMPHOCYTES # SPEC AUTO: 3.2 K/UL (ref 1–4.8)
LYMPHOCYTES # SPEC AUTO: 3.2 K/UL (ref 1–4.8)
LYMPHOCYTES NFR SPEC AUTO: 35 %
LYMPHOCYTES NFR SPEC AUTO: 42 %
MCH RBC QN AUTO: 29.7 PG (ref 25–35)
MCH RBC QN AUTO: 29.9 PG (ref 25–35)
MCHC RBC AUTO-ENTMCNC: 33.1 G/DL (ref 31–37)
MCHC RBC AUTO-ENTMCNC: 33.1 G/DL (ref 31–37)
MCV RBC AUTO: 89.7 FL (ref 80–100)
MCV RBC AUTO: 90.3 FL (ref 80–100)
MONOCYTES # BLD AUTO: 0.3 K/UL (ref 0–1)
MONOCYTES # BLD AUTO: 0.3 K/UL (ref 0–1)
MONOCYTES NFR BLD AUTO: 3 %
MONOCYTES NFR BLD AUTO: 4 %
NEUTROPHILS # BLD AUTO: 3.7 K/UL (ref 1.3–7.7)
NEUTROPHILS # BLD AUTO: 5.3 K/UL (ref 1.3–7.7)
NEUTROPHILS NFR BLD AUTO: 49 %
NEUTROPHILS NFR BLD AUTO: 58 %
PLATELET # BLD AUTO: 295 K/UL (ref 150–450)
PLATELET # BLD AUTO: 326 K/UL (ref 150–450)
PT BLD: 9.4 SEC (ref 9–12)
WBC # BLD AUTO: 7.6 K/UL (ref 3.8–10.6)
WBC # BLD AUTO: 9.1 K/UL (ref 3.8–10.6)

## 2019-10-25 NOTE — P.CRDCN
History of Present Illness


History of present illness: 


This is a pleasant 39-year-old  female past medical history significant

for coronary artery disease status post single-vessel bypass, DVT and PE 

maintained on long-term anticoagulation, dyslipidemia and hypertension.  She is 

also a daily smoker.  She underwent bypass surgery in 2018.  Initially 

she underwent heart catheterization at [facility] revealing a lesion in 

the mid LAD with evidence of intrinsic dissection, subsequent thereafter she 

went back to the hospital significant rise in troponin and had a total occlusion

of the LAD requiring bypass with LIMA to LAD.  Postprocedure she developed DVT 

and PE with right ventricular enlargement.  She currently follows in the office 

regularly with Dr. Jc.  We have been asked to see her in consultation 

secondary to chest pain. She states for the last 3-4 days she has been 

experiencing a sharp pain under the left breast that is worse with deep 

inspiration or cough. She went to [facility] and had a work-up done that 

was unremarkable per the patient. However they did want to admit her but she 

left AMA. Yesterday she continued having this pain and cough but then developed 

a sharp headache behind the left eye prompting her to come to ER. She is seen 

and examined laying flat in no acute distress. She continues to have pain when 

she takes a deep breath or coughs. She denies radiation tot he arm, back, neck 

or jaw. She denies palpitations, nausea, vomiting, diaphoresis or dizziness. 





EKG reveals sinus mechanism with T-wave inversions anteriorly, chronic.  No 

changes.


Bilateral lower extremity venous duplex reveals no DVT in the right lower 

extremity with evidence of chronic and acute left lower extremity DVT. 


Laboratory data reviewed, CBC unremarkable, d-dimer 1.08, sodium 139, potassium 

4.1, creatinine 0.98, magnesium 2.1, cardiac enzymes negative 2.


Current prescribed daily medications include Eliquis 5 mg twice a day, 

metoprolol 25 mg twice a day rosuvastatin 5 mg daily.





At the time of my exam:


CONSTITUTIONAL: Denies fever. Denies chills.


EYES: Denies blurred vision. Denies vision changes. Denies eye pain.


EARS, NOSE, MOUTH & THROAT: Denies headache. Denies sore throat. Denies ear 

pain.


CARDIOVASCULAR: Complains of pleuritic chest pain. Denies shortness of breath. 

Denies orthopnea. Denies PND. Denies palpitations.


RESPIRATORY: Complains of cough. 


GASTROINTESTINAL: Denies abdominal pain. Denies diarrhea. Denies constipation. 

Denies nausea. Denies vomiting.


MUSCULOSKELETAL: Complains of chronic left lower extremity discomfort.


INTEGUMENTARY: Denies pruitis. Denies rash.


NEUROLOGIC: Denies numbness. Denies tingling. Denies weakness.


PSYCHIATRIC: Denies anxiety. Denies depression.


ENDOCRINE: Denies fatigue. Denies weight change. Denies polydipsia. Denies 

polyurina.


GENITOURINARY: Denies burning, hematuria or urgency with micturation.


HEMATOLOGIC: Denies history of anemia. Denies bleeding. 





GENERAL: This is a 39-year-old  female in no apparent distress at the 

time of my examination.


HEENT: Head is atraumatic, normocephalic. Pupils are equal, round. Sclerae 

anicteric. Conjunctivae are clear. Mucous membranes of the mouth are moist. Neck

is supple. There is no jugular venous distention. No carotid bruit is heard.


LUNGS: Clear to auscultation no wheezes, rales or rhonchi. No chest wall 

tenderness is noted on palpation or with deep breathing.


HEART: Regular rate and rhythm without murmurs, rubs or gallops. S1 and S2 

heard.


ABDOMEN: Soft, nontender. Bowel sounds are heard. No organomegaly noted.


EXTREMITIES: No evidence of peripheral edema and no calf tenderness noted.


VASCULAR: Radial and dorsalis pedis pulses palpated, no evidence of clubbing.  


NEUROLOGIC: Patient is awake, alert and oriented x3.


 


ASSESSMENT


Chronic left lower extremity DVT, questionable acute. Will follow up with the 

radiologist


Pleuritic chest pain, worse with deep inspiration and cough. 


History of coronary artery bypass grafting secondary to LAD dissection with 

LIMA-LAD


Dyslipidemia


Hypertension


Morbid obesity, BMI 49


Non-compliance with long term anti-coagulation





PLAN


Spoke with Dr. Ontiveros who reviewed the films, he states the DVT looks chronic 

and will amend the previous report. 


Obtain records from [facility] of recent CTA, report reviewed is negative

for PE on 10/23/2019 at 1100. 


We will repeat an echo to assess cardiac structure and function. 


Compliance with eliquis imperative and strongly recommended. 


Thank you kindly for this consultation. 





Nurse Practitioner note has been reviewed, I agree with a documented findings 

and plan of care.  Patient was seen and examined.








Past Medical History


Past Medical History: Coronary Artery Disease (CAD), Chest Pain / Angina, Deep 

Vein Thrombosis (DVT), Hyperlipidemia, Myocardial Infarction (MI), Pulmonary 

Embolus (PE)


Last Myocardial Infarction Date:: 2018


History of Any Multi-Drug Resistant Organisms: MRSA


Date of last positivie culture/infection: 


MDRO Source:: abscess abdomen


Past Surgical History:  Section, Cholecystectomy, Coronary Bypass/CABG, 

Heart Catheterization


Additional Past Surgical History / Comment(s): right wrist surgery, carpal 

tunnel, MRSA S/P wound. left knee surgery


Past Anesthesia/Blood Transfusion Reactions: No Reported Reaction


Past Psychological History: No Psychological Hx Reported


Smoking Status: Current every day smoker


Past Alcohol Use History: None Reported


Past Drug Use History: None Reported





- Past Family History


  ** Mother


Family Medical History: Cancer, CVA/TIA, Diabetes Mellitus, Myocardial 

Infarction (MI), Renal Disease


Additional Family Medical History / Comment(s): uterine cancer, artificial 

valves





  ** Father


Additional Family Medical History / Comment(s): PAD





  ** Brother(s)


Family Medical History: Coronary Artery Disease (CAD), Diabetes Mellitus


Additional Family Medical History / Comment(s): 2 HEART STENTS, issue with heart

valve





  ** Sister(s)


Additional Family Medical History / Comment(s): psych issues





  ** Daughter(s)


Family Medical History: No Reported History





  ** Son(s)


Family Medical History: No Reported History





Medications and Allergies


                                Home Medications











 Medication  Instructions  Recorded  Confirmed  Type


 


Apixaban [Eliquis] 5 mg PO BID 10/30/18 10/25/19 History


 


Metoprolol Tartrate 25 mg PO BID 02/18/19 10/25/19 History


 


Ranitidine HCl [Zantac] 150 mg PO HS 10/23/19 10/25/19 History


 


Rosuvastatin Calcium [Crestor] 5 mg PO DAILY 10/23/19 10/25/19 History








                                    Allergies











Allergy/AdvReac Type Severity Reaction Status Date / Time


 


adhesive Allergy  Rash/Hives Verified 10/25/19 07:53


 


albuterol Allergy  Anaphylaxis Verified 10/25/19 07:53


 


amoxicillin Allergy  Anaphylaxis Verified 10/25/19 07:53


 


ampicillin Allergy  Anaphylaxis Verified 10/25/19 07:53


 


azithromycin [From Zithromax] Allergy  Anaphylaxis Verified 10/25/19 07:53


 


erythromycin base Allergy  Anaphylaxis Verified 10/25/19 07:53


 


latex Allergy  Rash/Hives Verified 10/25/19 07:53


 


penicillin V Allergy  Anaphylaxis Verified 10/25/19 07:53














Physical Exam


Vitals: 


                                   Vital Signs











  Temp Pulse Pulse Resp BP BP BP


 


 10/25/19 04:00  97.6 F   75  16   121/74 


 


 10/25/19 00:53  98.1 F   84  18    123/77


 


 10/24/19 21:58     18   


 


 10/24/19 20:35  98.7 F  96   16  110/71  














  Pulse Ox


 


 10/25/19 04:00 


 


 10/25/19 00:53  97


 


 10/24/19 21:58 


 


 10/24/19 20:35  98








                                Intake and Output











 10/24/19 10/25/19 10/25/19





 22:59 06:59 14:59


 


Other:   


 


  Weight 127.006 kg  














Results





                                 10/25/19 06:18





                                 10/24/19 21:35


                                 Cardiac Enzymes











  10/24/19 10/24/19 10/25/19 Range/Units





  21:35 21:35 06:18 


 


AST  17    (14-36)  U/L


 


Troponin I   <0.012  <0.012  (0.000-0.034)  ng/mL








                                   Coagulation











  10/24/19 10/24/19 10/25/19 Range/Units





  21:35 23:39 06:18 


 


PT  9.4  9.4   (9.0-12.0)  sec


 


APTT  24.1  23.6  145.6 H*  (22.0-30.0)  sec








                                       CBC











  10/24/19 10/24/19 10/25/19 Range/Units





  21:35 23:39 06:18 


 


WBC  9.0  9.1  7.6  (3.8-10.6)  k/uL


 


RBC  4.56  4.66  4.30  (3.80-5.40)  m/uL


 


Hgb  13.7  13.8  12.9  (11.4-16.0)  gm/dL


 


Hct  40.6  41.9  38.9  (34.0-46.0)  %


 


Plt Count  322  326  295  (150-450)  k/uL








                          Comprehensive Metabolic Panel











  10/24/19 Range/Units





  21:35 


 


Sodium  139  (137-145)  mmol/L


 


Potassium  4.1  (3.5-5.1)  mmol/L


 


Chloride  102  ()  mmol/L


 


Carbon Dioxide  30  (22-30)  mmol/L


 


BUN  16  (7-17)  mg/dL


 


Creatinine  0.98  (0.52-1.04)  mg/dL


 


Glucose  99  (74-99)  mg/dL


 


Calcium  9.5  (8.4-10.2)  mg/dL


 


AST  17  (14-36)  U/L


 


ALT  21  (9-52)  U/L


 


Alkaline Phosphatase  81  ()  U/L


 


Total Protein  7.0  (6.3-8.2)  g/dL


 


Albumin  4.0  (3.5-5.0)  g/dL








                               Current Medications











Generic Name Dose Route Start Last Admin





  Trade Name Freq  PRN Reason Stop Dose Admin


 


Heparin Sodium (Porcine)  0 unit  10/24/19 22:52 





  Heparin  IV  





  PER PROTOCOL PRN  





  Low PTT  





  Protocol  


 


Sodium Chloride  1,000 mls @ 80 mls/hr  10/25/19 00:30  10/25/19 01:25





  Saline 0.9%  IV   80 mls/hr





  .[id number] CHON   Administration


 


Morphine Sulfate  2 mg  10/25/19 02:07 





  Morphine Sulfate (Inj)  IVP  





  Q4H PRN  





  Pain/Discomfort  


 


Naloxone HCl  0.2 mg  10/25/19 00:21 





  Narcan  IV  





  Q2M PRN  





  Opioid Reversal  








                                Intake and Output











 10/24/19 10/25/19 10/25/19





 22:59 06:59 14:59


 


Other:   


 


  Weight 127.006 kg  








                                        





                                 10/25/19 06:18 





                                 10/24/19 21:35

## 2019-10-25 NOTE — P.HPIM
History of Present Illness


H&P Date: 10/25/19


Chief Complaint: Chest pain





Patient is a 39-year-old female with a known history of DVT and pulmonary 

embolism, coronary artery disease with history of CABG, history of MI and 

nicotine addiction came to ER with complaints of chest pain.  Chest pain is 

mainly under the left breast, worsens with deep breathing.  Worsens with 

coughing as well.  No associated nausea vomiting or abdominal pain.  Chest pain 

is like stabbing type pain.  No radiation.  No headache or dizziness or 

lightheadedness.  No palpitations.  No leg swelling.  Patient initially went to 

[facility] and had cardiac workup.  Is unremarkable.  Patient left AMA.  

Patient otherwise continues to have pain and came to ER for evaluation.


Patient is on chronic anticoagulation with Eliquis.  Patient is not taking 

adequate ablation for the past 4-5 days.





EKG reveals sinus mechanism with T-wave inversions anteriorly, chronic.  No 

changes.


Bilateral lower extremity venous duplex reveals no DVT in the right lower 

extremity with evidence of chronic and acute left lower extremity DVT. 


Laboratory data reviewed, CBC unremarkable, d-dimer 1.08, sodium 139, potassium 

4.1, creatinine 0.98, magnesium 2.1, cardiac enzymes negative 2.


Current prescribed daily medications include Eliquis 5 mg twice a day, m

etoprolol 25 mg twice a day rosuvastatin 5 mg daily.





D-dimer 1.08





Review of Systems





Constitutional: Patient denies any fever or chills .  No generalized weakness or

weight loss.  


Abdomen: Patient denied nausea vomiting and diarrhea and abdominal pain.


Cardiovascular: Agent does have pleuritic chest pain and mild shortness of 

breath.  No palpitations.


Respiratory: patient denied any cough is from production.  No shortness of 

breath


Neurologic: Patient denied any numbness or tingling headache.


Musculoskeletal: Patient denies any complaints of joint swelling or deformity.


Skin: Negative


Psychiatric: Negative


Endocrine: No heat or cold intolerance.  No recent weight gain.


Genitourinary: No dysuria or hematuria.


All other 14 point ROS negative except the above





Past Medical History


Past Medical History: Coronary Artery Disease (CAD), Chest Pain / Angina, Deep 

Vein Thrombosis (DVT), Hyperlipidemia, Myocardial Infarction (MI), Pulmonary 

Embolus (PE)


Last Myocardial Infarction Date:: 2018


History of Any Multi-Drug Resistant Organisms: MRSA


Date of last positivie culture/infection: 2015


MDRO Source:: abscess abdomen


Past Surgical History:  Section, Cholecystectomy, Coronary Bypass/CABG, 

Heart Catheterization


Additional Past Surgical History / Comment(s): right wrist surgery, carpal 

tunnel, MRSA S/P wound. left knee surgery


Past Anesthesia/Blood Transfusion Reactions: No Reported Reaction


Past Psychological History: No Psychological Hx Reported


Smoking Status: Current every day smoker


Past Alcohol Use History: None Reported


Past Drug Use History: None Reported





- Past Family History


  ** Mother


Family Medical History: Cancer, CVA/TIA, Diabetes Mellitus, Myocardial 

Infarction (MI), Renal Disease


Additional Family Medical History / Comment(s): uterine cancer, artificial 

valves





  ** Father


Additional Family Medical History / Comment(s): PAD





  ** Brother(s)


Family Medical History: Coronary Artery Disease (CAD), Diabetes Mellitus


Additional Family Medical History / Comment(s): 2 HEART STENTS, issue with heart

valve





  ** Sister(s)


Additional Family Medical History / Comment(s): psych issues





  ** Daughter(s)


Family Medical History: No Reported History





  ** Son(s)


Family Medical History: No Reported History





Medications and Allergies


                                Home Medications











 Medication  Instructions  Recorded  Confirmed  Type


 


Apixaban [Eliquis] 5 mg PO BID 10/30/18 10/25/19 History


 


Metoprolol Tartrate 25 mg PO BID 02/18/19 10/25/19 History


 


Ranitidine HCl [Zantac] 150 mg PO HS 10/23/19 10/25/19 History


 


Rosuvastatin Calcium [Crestor] 5 mg PO DAILY 10/23/19 10/25/19 History








                                    Allergies











Allergy/AdvReac Type Severity Reaction Status Date / Time


 


adhesive Allergy  Rash/Hives Verified 10/25/19 07:53


 


albuterol Allergy  Anaphylaxis Verified 10/25/19 07:53


 


amoxicillin Allergy  Anaphylaxis Verified 10/25/19 07:53


 


ampicillin Allergy  Anaphylaxis Verified 10/25/19 07:53


 


azithromycin [From Zithromax] Allergy  Anaphylaxis Verified 10/25/19 07:53


 


erythromycin base Allergy  Anaphylaxis Verified 10/25/19 07:53


 


latex Allergy  Rash/Hives Verified 10/25/19 07:53


 


penicillin V Allergy  Anaphylaxis Verified 10/25/19 07:53














Physical Exam


Vitals: 


                                   Vital Signs











  Temp Pulse Pulse Resp BP BP BP


 


 10/25/19 08:00  97.8 F   71  18    113/72


 


 10/25/19 04:00  97.6 F   75  16   121/74 


 


 10/25/19 00:53  98.1 F   84  18    123/77


 


 10/24/19 21:58     18   


 


 10/24/19 20:35  98.7 F  96   16  110/71  














  Pulse Ox


 


 10/25/19 08:00  97


 


 10/25/19 04:00 


 


 10/25/19 00:53  97


 


 10/24/19 21:58 


 


 10/24/19 20:35  98








                                Intake and Output











 10/24/19 10/25/19 10/25/19





 22:59 06:59 14:59


 


Other:   


 


  Voiding Method   Toilet


 


  Weight 127.006 kg  














PHYSICAL EXAMINATION: 


Patient is lying in the bed comfortably, no acute distress, awake alert and 

oriented.. 


HEENT: Normocephalic. Neck is supple. Pupils reactive. Nostrils clear. Oral 

cavity is moist. Ears reveal no drainage. 


Neck reveals no JVD, carotid bruits, or thyromegaly. 


CHEST EXAMINATION: Trachea is central. Symmetrical expansion. Lung fields clear 

to auscultation and percussion. 


CARDIAC: Normal S1, S2 with no gallops. No murmurs 


ABDOMEN: Soft. Bowel sounds normal. No organomegaly. No abdominal bruits. 


Extremities: reveal no edema.  No clubbing or cyanosis


Neurologically awake, alert, oriented x3 with well-coordinated movements.  No 

focal deficits noted


Skin: No rash or skin lesions. 


Psychiatric: Coperative.  Nonsuicidal


Musculoskeletal: No joint swelling or deformity.  Normal range of motion.








Results


CBC & Chem 7: 


                                 10/25/19 06:18





                                 10/24/19 21:35


Labs: 


                  Abnormal Lab Results - Last 24 Hours (Table)











  10/24/19 10/25/19 Range/Units





  21:35 06:18 


 


APTT   145.6 H*  (22.0-30.0)  sec


 


D-Dimer  1.08 H   (<0.60)  mg/L FEU














Thrombosis Risk Factor Assmnt





- Choose All That Apply


Any of the Below Risk Factors Present?: Yes


Each Factor Represents 1 point: Obesity (BMI >25), Swollen legs (current)


Other Risk Factors: Yes


Each Risk Factor Represents 3 Points: History of DVT/PE


Other congenital or acquired thrombophilia - If yes, enter type in comment: No


Thrombosis Risk Factor Assessment Total Risk Factor Score: 5


Thrombosis Risk Factor Assessment Level: High Risk





Assessment and Plan


Assessment: 





Pleuritic chest pain with history of PE.  CT angiogram at the [facility] 

is negative for PE on 10/23/2019


Acute and chronic left lower extremity DVT.  Currently on anticoagulation with 

Eliquis.


Coronary artery disease with history of CABG


Hypertension


Hyperlipidemia


Ongoing nicotine addiction


Noncompliance with anticoagulation.





Plan:


Patient was continued on heparin drip.  Patient did improve symptomatically.  

Currently started back on Eliquis.  2-D echo cardiac exam showed no structural 

abnormalities.  Patient was counseled extensively for medication complaints.  

Recommends a follow with hematology as well as an outpatient.


Patient was seen by cardiology.


\]


Time with Patient: Greater than 30

## 2019-10-25 NOTE — P.DS
Providers


Date of admission: 


10/25/19 00:10





Expected date of discharge: 10/25/19


Attending physician: 


Catherine Rai





Consults: 





                                        





10/25/19 00:21


Consult Physician Stat 


   Consulting Provider: Bishop Mares


   Consult Reason/Comments: chest pain, hx PE CABG


   Do you want consulting provider notified?: Yes, Notify in am











Primary care physician: 


K[facility] Course: 





Discharge diagnosis


Pleuritic chest pain with history of PE.  CT angiogram at the [facility] 

is negative for PE on 10/23/2019


Acute and chronic left lower extremity DVT.  Currently on anticoagulation with E

liquis.


Coronary artery disease with history of CABG


Hypertension


Hyperlipidemia


Ongoing nicotine addiction


Noncompliance with anticoagulation.





Hospital course


Patient is a 39-year-old female with a known history of DVT and pulmonary 

embolism, coronary artery disease with history of CABG, history of MI and 

nicotine addiction came to ER with complaints of chest pain.  Chest pain is 

mainly under the left breast, worsens with deep breathing.  Worsens with 

coughing as well.  No associated nausea vomiting or abdominal pain.  Chest pain 

is like stabbing type pain.  No radiation.  No headache or dizziness or 

lightheadedness.  No palpitations.  No leg swelling.  Patient initially went to 

[facility] and had cardiac workup.  Is unremarkable.  Patient left AMA.  

Patient otherwise continues to have pain and came to ER for evaluation.


Patient is on chronic anticoagulation with Eliquis.  Patient is not taking 

adequate ablation for the past 4-5 days.





EKG reveals sinus mechanism with T-wave inversions anteriorly, chronic.  No 

changes.


Bilateral lower extremity venous duplex reveals no DVT in the right lower 

extremity with evidence of chronic and acute left lower extremity DVT. 


Laboratory data reviewed, CBC unremarkable, d-dimer 1.08, sodium 139, potassium 

4.1, creatinine 0.98, magnesium 2.1, cardiac enzymes negative 2.


Current prescribed daily medications include Eliquis 5 mg twice a day, 

metoprolol 25 mg twice a day rosuvastatin 5 mg daily.





D-dimer 1.08





Patient was continued on heparin drip.  Patient did improve symptomatically.  

Currently started back on Eliquis.  2-D echo cardiac exam showed no structural 

abnormalities.  Patient was counseled extensively for medication complaints.  

Recommends a follow with hematology as well as an outpatient.


Patient was seen by cardiology.





Discharge physical examination was done and vitals reviewed.


Patient Condition at Discharge: Serious





Plan - Discharge Summary


Discharge Rx Participant: No


New Discharge Prescriptions: 


Continue


   Apixaban [Eliquis] 5 mg PO BID


   Metoprolol Tartrate 25 mg PO BID


   Ranitidine HCl [Zantac] 150 mg PO HS


   Rosuvastatin Calcium [Crestor] 5 mg PO DAILY


Discharge Medication List





Apixaban [Eliquis] 5 mg PO BID 10/30/18 [History]


Metoprolol Tartrate 25 mg PO BID 02/18/19 [History]


Ranitidine HCl [Zantac] 150 mg PO HS 10/23/19 [History]


Rosuvastatin Calcium [Crestor] 5 mg PO DAILY 10/23/19 [History]








Follow up Appointment(s)/Referral(s): 


Eloise Jc MD [STAFF PHYSICIAN] - 1 Week (office will call the patient 

with date and time for follow up with Dr. JULIUS Jc.)


Katy Tay MD [Primary Care Provider] - 1-2 days


Patient Instructions/Handouts:  Chest Pain (DC)


Discharge Disposition: HOME SELF-CARE

## 2019-10-25 NOTE — ED
Medical Decision Making





- Medical Decision Making


Patient began complaining of headache behind her left eye.  Reports this is 

consistent with migraine she has had in the past.  Neurologic exam within normal

limits.  No focal deficit.  Patient administered analgesia.








- Lab Data


Result diagrams: 


                                 10/24/19 23:39





                                 10/24/19 21:35





                                   Lab Results











  10/24/19 10/24/19 10/24/19 Range/Units





  21:35 21:35 21:35 


 


WBC  9.0    (3.8-10.6)  k/uL


 


RBC  4.56    (3.80-5.40)  m/uL


 


Hgb  13.7    (11.4-16.0)  gm/dL


 


Hct  40.6    (34.0-46.0)  %


 


MCV  89.2    (80.0-100.0)  fL


 


MCH  30.0    (25.0-35.0)  pg


 


MCHC  33.6    (31.0-37.0)  g/dL


 


RDW  13.0    (11.5-15.5)  %


 


Plt Count  322    (150-450)  k/uL


 


Neutrophils %  61    %


 


Lymphocytes %  31    %


 


Monocytes %  4    %


 


Eosinophils %  2    %


 


Basophils %  1    %


 


Neutrophils #  5.5    (1.3-7.7)  k/uL


 


Lymphocytes #  2.8    (1.0-4.8)  k/uL


 


Monocytes #  0.4    (0-1.0)  k/uL


 


Eosinophils #  0.1    (0-0.7)  k/uL


 


Basophils #  0.1    (0-0.2)  k/uL


 


PT    9.4  (9.0-12.0)  sec


 


INR    0.9  (<1.2)  


 


APTT    24.1  (22.0-30.0)  sec


 


D-Dimer    1.08 H  (<0.60)  mg/L FEU


 


Sodium   139   (137-145)  mmol/L


 


Potassium   4.1   (3.5-5.1)  mmol/L


 


Chloride   102   ()  mmol/L


 


Carbon Dioxide   30   (22-30)  mmol/L


 


Anion Gap   7   mmol/L


 


BUN   16   (7-17)  mg/dL


 


Creatinine   0.98   (0.52-1.04)  mg/dL


 


Est GFR (CKD-EPI)AfAm   85   (>60 ml/min/1.73 sqM)  


 


Est GFR (CKD-EPI)NonAf   73   (>60 ml/min/1.73 sqM)  


 


Glucose   99   (74-99)  mg/dL


 


Calcium   9.5   (8.4-10.2)  mg/dL


 


Magnesium   2.1   (1.6-2.3)  mg/dL


 


Total Bilirubin   0.2   (0.2-1.3)  mg/dL


 


AST   17   (14-36)  U/L


 


ALT   21   (9-52)  U/L


 


Alkaline Phosphatase   81   ()  U/L


 


Troponin I     (0.000-0.034)  ng/mL


 


Total Protein   7.0   (6.3-8.2)  g/dL


 


Albumin   4.0   (3.5-5.0)  g/dL














  10/24/19 10/24/19 10/24/19 Range/Units





  21:35 23:39 23:39 


 


WBC   9.1   (3.8-10.6)  k/uL


 


RBC   4.66   (3.80-5.40)  m/uL


 


Hgb   13.8   (11.4-16.0)  gm/dL


 


Hct   41.9   (34.0-46.0)  %


 


MCV   89.7   (80.0-100.0)  fL


 


MCH   29.7   (25.0-35.0)  pg


 


MCHC   33.1   (31.0-37.0)  g/dL


 


RDW   13.0   (11.5-15.5)  %


 


Plt Count   326   (150-450)  k/uL


 


Neutrophils %   58   %


 


Lymphocytes %   35   %


 


Monocytes %   3   %


 


Eosinophils %   2   %


 


Basophils %   0   %


 


Neutrophils #   5.3   (1.3-7.7)  k/uL


 


Lymphocytes #   3.2   (1.0-4.8)  k/uL


 


Monocytes #   0.3   (0-1.0)  k/uL


 


Eosinophils #   0.2   (0-0.7)  k/uL


 


Basophils #   0.0   (0-0.2)  k/uL


 


PT    9.4  (9.0-12.0)  sec


 


INR    0.9  (<1.2)  


 


APTT    23.6  (22.0-30.0)  sec


 


D-Dimer     (<0.60)  mg/L FEU


 


Sodium     (137-145)  mmol/L


 


Potassium     (3.5-5.1)  mmol/L


 


Chloride     ()  mmol/L


 


Carbon Dioxide     (22-30)  mmol/L


 


Anion Gap     mmol/L


 


BUN     (7-17)  mg/dL


 


Creatinine     (0.52-1.04)  mg/dL


 


Est GFR (CKD-EPI)AfAm     (>60 ml/min/1.73 sqM)  


 


Est GFR (CKD-EPI)NonAf     (>60 ml/min/1.73 sqM)  


 


Glucose     (74-99)  mg/dL


 


Calcium     (8.4-10.2)  mg/dL


 


Magnesium     (1.6-2.3)  mg/dL


 


Total Bilirubin     (0.2-1.3)  mg/dL


 


AST     (14-36)  U/L


 


ALT     (9-52)  U/L


 


Alkaline Phosphatase     ()  U/L


 


Troponin I  <0.012    (0.000-0.034)  ng/mL


 


Total Protein     (6.3-8.2)  g/dL


 


Albumin     (3.5-5.0)  g/dL














Disposition


Clinical Impression: 


 Chest pain





Disposition: ADMITTED AS IP TO THIS [facility]


Condition: Serious


Is patient prescribed a controlled substance at d/c from ED?: No


Referrals: 


Katy Tay MD [Primary Care Provider] - 1-2 days

## 2019-10-25 NOTE — ECHOF
Referral Reason:



MEASUREMENTS

--------

HEIGHT: 160.0 cm

WEIGHT: 127.0 kg

BP: 121/74

RVIDd:   3.5 cm     (< 3.3)

IVSd:   1.4 cm     (0.6 - 1.1)

LVIDd:   3.8 cm     (3.9 - 5.3)

LVPWd:   1.5 cm     (0.6 - 1.1)

IVSs:   1.5 cm

LVIDs:   2.8 cm

LVPWs:   1.7 cm

LAESV Index (A-L):   19.61 ml/m

Ao Diam:   3.1 cm     (2.0 - 3.7)

AV Cusp:   2.2 cm     (1.5 - 2.6)

LA Diam:   4.4 cm     (2.7 - 3.8)

MV EXCURSION:   17.459 mm     (> 18.000)

MV EF SLOPE:   52 mm/s     (70 - 150)

EPSS:   0.7 cm

MV E Karthik:   0.81 m/s

MV DecT:   196 ms

MV A Karthik:   0.88 m/s

MV E/A Ratio:   0.92 

RAP:   5.00 mmHg

RVSP:   29.34 mmHg







FINDINGS

--------

Sinus rhythm.

This was a technically difficult study with suboptimal views.

The left ventricular size is normal.   There is moderate concentric left ventricular hypertrophy.   O
verall left ventricular systolic function is normal with, an EF between 55 - 60 %.   Septal wall cinthia
on is delayed and consistent with prior cardiac surgery.   Apical septum LV wall motion is hypokineti
c.

The right ventricle is mildly enlarged.

Normal LA  size by volume 22+/-6 ml/m2.

The right atrial size is normal.

5.0mg of Lumason was utilized for enhancement of images

Interatrial and interventricular septum intact.

The aortic valve was not well visualized.   There is no evidence of aortic regurgitation.   There is 
no evidence of aortic stenosis.

No mitral regurgitation.

Mild tricuspid regurgitation present.   There is no evidence of pulmonary hypertension.   The right v
entricular systolic pressure, as measured by Doppler, is 29.34mmHg.

There is no pulmonic regurgitation present.

The aortic root size is normal.

IVC Not well visulized.

There is no pericardial effusion.



CONCLUSIONS

--------

1. Sinus rhythm.

2. This was a technically difficult study with suboptimal views.

3. The left ventricular size is normal.

4. There is moderate concentric left ventricular hypertrophy.

5. Overall left ventricular systolic function is normal with, an EF between 55 - 60 %.

6. Septal wall motion is delayed and consistent with prior cardiac surgery.

7. Apical septum LV wall motion is hypokinetic.

8. The right ventricle is mildly enlarged.

9. Normal LA size by volume 22+/-6 ml/m2.

10. The right atrial size is normal.

11. 5.0mg of Lumason was utilized for enhancement of images

12. Interatrial and interventricular septum intact.

13. The aortic valve was not well visualized.

14. There is no evidence of aortic regurgitation.

15. There is no evidence of aortic stenosis.

16. No mitral regurgitation.

17. Mild tricuspid regurgitation present.

18. There is no evidence of pulmonary hypertension.

19. The right ventricular systolic pressure, as measured by Doppler, is 29.34mmHg.

20. There is no pulmonic regurgitation present.

21. The aortic root size is normal.

22. IVC Not well visulized.

23. There is no pericardial effusion.





SONOGRAPHER: Marry Iniguez RDCS

## 2019-10-28 NOTE — HP
HISTORY AND PHYSICAL



COMBINATION HISTORY AND PHYSICAL AND DISCHARGE SUMMARY:



CHIEF COMPLAINT:

Chest pain.



HISTORY OF PRESENT ILLNESS:

This 39-year-old woman was admitted with chest pain; however, the patient left the

hospital AGAINST MEDICAL ADVICE from the ER itself.  Please refer to the ER notes and

staff notes for further details.



FINAL DIAGNOSIS:

Chest pain; rule out myocardial infarction.



The prognosis remained guarded throughout the hospital stay.





MMODL / IJN: [id number] / Job#: [id number]

## 2019-11-19 ENCOUNTER — HOSPITAL ENCOUNTER (EMERGENCY)
Dept: HOSPITAL 47 - EC | Age: 39
Discharge: HOME | End: 2019-11-19
Payer: COMMERCIAL

## 2019-11-19 VITALS
SYSTOLIC BLOOD PRESSURE: 140 MMHG | DIASTOLIC BLOOD PRESSURE: 85 MMHG | TEMPERATURE: 98 F | RESPIRATION RATE: 18 BRPM | HEART RATE: 88 BPM

## 2019-11-19 DIAGNOSIS — I25.2: ICD-10-CM

## 2019-11-19 DIAGNOSIS — E78.5: ICD-10-CM

## 2019-11-19 DIAGNOSIS — Z88.8: ICD-10-CM

## 2019-11-19 DIAGNOSIS — L02.411: Primary | ICD-10-CM

## 2019-11-19 DIAGNOSIS — Z79.01: ICD-10-CM

## 2019-11-19 DIAGNOSIS — I25.119: ICD-10-CM

## 2019-11-19 DIAGNOSIS — Z91.040: ICD-10-CM

## 2019-11-19 DIAGNOSIS — F17.200: ICD-10-CM

## 2019-11-19 DIAGNOSIS — Z88.1: ICD-10-CM

## 2019-11-19 DIAGNOSIS — Z79.899: ICD-10-CM

## 2019-11-19 DIAGNOSIS — Z88.0: ICD-10-CM

## 2019-11-19 DIAGNOSIS — Z95.1: ICD-10-CM

## 2019-11-19 DIAGNOSIS — Z91.048: ICD-10-CM

## 2019-11-19 DIAGNOSIS — Z98.890: ICD-10-CM

## 2019-11-19 DIAGNOSIS — Z86.718: ICD-10-CM

## 2019-11-19 DIAGNOSIS — Z86.14: ICD-10-CM

## 2019-11-19 DIAGNOSIS — Z86.711: ICD-10-CM

## 2019-11-19 PROCEDURE — 87070 CULTURE OTHR SPECIMN AEROBIC: CPT

## 2019-11-19 PROCEDURE — 87205 SMEAR GRAM STAIN: CPT

## 2019-11-19 PROCEDURE — 87186 SC STD MICRODIL/AGAR DIL: CPT

## 2019-11-19 PROCEDURE — 87077 CULTURE AEROBIC IDENTIFY: CPT

## 2019-11-19 PROCEDURE — 10060 I&D ABSCESS SIMPLE/SINGLE: CPT

## 2019-11-19 PROCEDURE — 99283 EMERGENCY DEPT VISIT LOW MDM: CPT

## 2019-11-19 NOTE — ED
General Adult HPI





- General


Chief complaint: Wound/Laceration


Stated complaint: Abcess underarm


Time Seen by Provider: 19 18:22


Source: patient


Mode of arrival: ambulatory


Limitations: no limitations





- History of Present Illness


Initial comments: 





patient is a 39-year-old female presenting to emergency Department with chief 

complaint of a draining wound.  Patient reports on Saturday she developed a 

small abrasion in the right axilla which she thought it was an ingrown hair.  

Patient reports she attempted to "pop".  Patient reports since then the lesion 

has increased in size and is not draining green/yellow discharge.  Patient 

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

 Patient reports that the first case since then.  Patient denies any night 

sweats fevers or chills.  Patient reports the region in the right eczema is very

tender.  Patient also reports she is very ALLERGIC to tape and has multiple exc

oriations in the right axilla from using not appropriate tape.





- Related Data


                                Home Medications











 Medication  Instructions  Recorded  Confirmed


 


Apixaban [Eliquis] 5 mg PO BID 10/30/18 10/25/19


 


Metoprolol Tartrate 25 mg PO BID 02/18/19 10/25/19


 


Ranitidine HCl [Zantac] 150 mg PO HS 10/23/19 10/25/19


 


Rosuvastatin Calcium [Crestor] 5 mg PO DAILY 10/23/19 10/25/19








                                  Previous Rx's











 Medication  Instructions  Recorded


 


Doxycycline Monohydrate [Monodox] 100 mg PO Q12HR #20 cap 19











                                    Allergies











Allergy/AdvReac Type Severity Reaction Status Date / Time


 


adhesive Allergy  Rash/Hives Verified 19 18:21


 


albuterol Allergy  Anaphylaxis Verified 19 18:21


 


amoxicillin Allergy  Anaphylaxis Verified 19 18:21


 


ampicillin Allergy  Anaphylaxis Verified 19 18:21


 


azithromycin [From Zithromax] Allergy  Anaphylaxis Verified 19 18:21


 


erythromycin base Allergy  Anaphylaxis Verified 19 18:21


 


latex Allergy  Rash/Hives Verified 19 18:21


 


penicillin V Allergy  Anaphylaxis Verified 19 18:21














Review of Systems


ROS Statement: 


Those systems with pertinent positive or pertinent negative responses have been 

documented in the HPI.





ROS Other: All systems not noted in ROS Statement are negative.





Past Medical History


Past Medical History: Coronary Artery Disease (CAD), Chest Pain / Angina, Deep 

Vein Thrombosis (DVT), Hyperlipidemia, Myocardial Infarction (MI), Pulmonary 

Embolus (PE)


Last Myocardial Infarction Date:: 2018


History of Any Multi-Drug Resistant Organisms: MRSA


Date of last positivie culture/infection: 


MDRO Source:: abscess abdomen


Past Surgical History:  Section, Cholecystectomy, Coronary Bypass/CABG, 

Heart Catheterization


Additional Past Surgical History / Comment(s): right wrist surgery, carpal 

tunnel, MRSA S/P wound. left knee surgery


Past Anesthesia/Blood Transfusion Reactions: No Reported Reaction


Past Psychological History: No Psychological Hx Reported


Smoking Status: Current every day smoker


Past Alcohol Use History: None Reported


Past Drug Use History: None Reported





- Past Family History


  ** Mother


Family Medical History: Cancer, CVA/TIA, Diabetes Mellitus, Myocardial 

Infarction (MI), Renal Disease


Additional Family Medical History / Comment(s): uterine cancer, artificial 

valves





  ** Father


Additional Family Medical History / Comment(s): PAD





  ** Brother(s)


Family Medical History: Coronary Artery Disease (CAD), Diabetes Mellitus


Additional Family Medical History / Comment(s): 2 HEART STENTS, issue with heart

valve





  ** Sister(s)


Additional Family Medical History / Comment(s): psych issues





  ** Daughter(s)


Family Medical History: No Reported History





  ** Son(s)


Family Medical History: No Reported History





General Exam


Limitations: no limitations


General appearance: alert, in no apparent distress, obese


Head exam: Present: atraumatic, normocephalic, normal inspection


Eye exam: Present: normal appearance, PERRL, EOMI


Pupils: Present: normal accommodation


ENT exam: Present: normal exam, normal oropharynx, mucous membranes moist, TM's 

normal bilaterally, normal external ear exam


Neck exam: Present: normal inspection, full ROM


Respiratory exam: Present: normal lung sounds bilaterally


Cardiovascular Exam: Present: regular rate, normal rhythm, normal heart sounds


Extremities exam: Present: full ROM.  Absent: normal inspection (Lesion in the 

right axilla appears to be an abscess with yellow/green drainage.  1.5 cm of 

induration with less than 1 cm of fluctuance.  Mild surrounding erythema.)


Back exam: Present: normal inspection, full ROM.  Absent: CVA tenderness (R), 

CVA tenderness (L)


Neurological exam: Present: alert, oriented X3


Psychiatric exam: Present: normal affect, normal mood


Skin exam: Present: warm, dry, intact, normal color





Course


                                   Vital Signs











  19





  18:19


 


Temperature 97.7 F


 


Pulse Rate 92


 


Respiratory 20





Rate 


 


Blood Pressure 145/91


 


O2 Sat by Pulse 99





Oximetry 














Procedures





- Incision & Drainage


Consent Obtained: verbal consent


Indication: Axillary abscess


Site: other (Right axilla)


Size (cm): 2


Anesthetic Used: lidocaine 1%


Amount (mLs): 3


I&D Cleaning Method: Betadine


Sterile Field Used?: Yes


Scalpel Used: #11


Needle Aspiration Performed?: No


Irrigation Performed?: No


I&D Drainage Obtained: Pus, Blood


Culture Obtained?: Yes


Complications: pain, bleeding


Patient Tolerated Procedure: well, no complications





Medical Decision Making





- Medical Decision Making





patient is a 39-year-old female presenting to emergency Department chief 

complaint of an abscess.  On physical examination there appears to be a draining

abscess in the right axilla with about 1.5 cm of induration and less than 1 cm 

fluctuance.  Wound culture was obtained.  Patient was given Norco 5 for pain.  

Patient will be discharged with a Tylenol 3 starter pack.  Patient advised about

the possible side effects of the medication.  Patient given a single dose of 

doxycycline discharged with 10 day dose of doxycycline.  Patient has a history 

of MRSA so we will cover for it.  I performed an quite a bit of pus and blood 

was removed.  No packing applied.  Patient advised to follow-up with primary 

care.  Strict return primary's were thoroughly discussed with patient was 

understanding and agreeable.  Case discussed with physician.





Disposition


Clinical Impression: 


 Abscess of axilla, right





Disposition: HOME SELF-CARE


Condition: Stable


Instructions (If sedation given, give patient instructions):  Abscess (ED)


Additional Instructions: 


Please take prescribed medication as directed.  Please follow up with primary 

care.  Please return to emergency department if symptoms worsen.


Prescriptions: 


Doxycycline Monohydrate [Monodox] 100 mg PO Q12HR #20 cap


Is patient prescribed a controlled substance at d/c from ED?: No


Referrals: 


Katy Tay MD [Primary Care Provider] - 1-2 days


Time of Disposition: 19:29

## 2019-12-12 ENCOUNTER — HOSPITAL ENCOUNTER (OUTPATIENT)
Dept: HOSPITAL 47 - EC | Age: 39
Setting detail: OBSERVATION
LOS: 1 days | Discharge: HOME | End: 2019-12-13
Payer: COMMERCIAL

## 2019-12-12 VITALS — RESPIRATION RATE: 18 BRPM

## 2019-12-12 DIAGNOSIS — Z79.899: ICD-10-CM

## 2019-12-12 DIAGNOSIS — Z86.711: ICD-10-CM

## 2019-12-12 DIAGNOSIS — I25.2: ICD-10-CM

## 2019-12-12 DIAGNOSIS — Z80.49: ICD-10-CM

## 2019-12-12 DIAGNOSIS — F17.200: ICD-10-CM

## 2019-12-12 DIAGNOSIS — Z90.49: ICD-10-CM

## 2019-12-12 DIAGNOSIS — Z88.1: ICD-10-CM

## 2019-12-12 DIAGNOSIS — Z83.3: ICD-10-CM

## 2019-12-12 DIAGNOSIS — Z86.14: ICD-10-CM

## 2019-12-12 DIAGNOSIS — Z86.718: ICD-10-CM

## 2019-12-12 DIAGNOSIS — Z91.09: ICD-10-CM

## 2019-12-12 DIAGNOSIS — R53.83: ICD-10-CM

## 2019-12-12 DIAGNOSIS — Z91.040: ICD-10-CM

## 2019-12-12 DIAGNOSIS — E78.5: ICD-10-CM

## 2019-12-12 DIAGNOSIS — Z84.1: ICD-10-CM

## 2019-12-12 DIAGNOSIS — Z88.0: ICD-10-CM

## 2019-12-12 DIAGNOSIS — Z95.1: ICD-10-CM

## 2019-12-12 DIAGNOSIS — R06.02: ICD-10-CM

## 2019-12-12 DIAGNOSIS — Z98.890: ICD-10-CM

## 2019-12-12 DIAGNOSIS — Z82.3: ICD-10-CM

## 2019-12-12 DIAGNOSIS — Z82.49: ICD-10-CM

## 2019-12-12 DIAGNOSIS — Z81.8: ICD-10-CM

## 2019-12-12 DIAGNOSIS — R07.89: Primary | ICD-10-CM

## 2019-12-12 DIAGNOSIS — Z88.8: ICD-10-CM

## 2019-12-12 DIAGNOSIS — Z86.69: ICD-10-CM

## 2019-12-12 DIAGNOSIS — Z79.01: ICD-10-CM

## 2019-12-12 DIAGNOSIS — E66.01: ICD-10-CM

## 2019-12-12 DIAGNOSIS — I25.10: ICD-10-CM

## 2019-12-12 PROCEDURE — 84703 CHORIONIC GONADOTROPIN ASSAY: CPT

## 2019-12-12 PROCEDURE — 85025 COMPLETE CBC W/AUTO DIFF WBC: CPT

## 2019-12-12 PROCEDURE — 85652 RBC SED RATE AUTOMATED: CPT

## 2019-12-12 PROCEDURE — 93005 ELECTROCARDIOGRAM TRACING: CPT

## 2019-12-12 PROCEDURE — 84484 ASSAY OF TROPONIN QUANT: CPT

## 2019-12-12 PROCEDURE — 99285 EMERGENCY DEPT VISIT HI MDM: CPT

## 2019-12-12 PROCEDURE — 80061 LIPID PANEL: CPT

## 2019-12-12 PROCEDURE — 80048 BASIC METABOLIC PNL TOTAL CA: CPT

## 2019-12-12 PROCEDURE — 93351 STRESS TTE COMPLETE: CPT

## 2019-12-12 PROCEDURE — 86140 C-REACTIVE PROTEIN: CPT

## 2019-12-12 PROCEDURE — 36415 COLL VENOUS BLD VENIPUNCTURE: CPT

## 2019-12-12 RX ADMIN — APIXABAN SCH MG: 5 TABLET, FILM COATED ORAL at 21:14

## 2019-12-12 RX ADMIN — NICOTINE SCH PATCH: 14 PATCH, EXTENDED RELEASE TRANSDERMAL at 21:14

## 2019-12-12 NOTE — ED
General Adult HPI





- General


Stated complaint: chest pain


Time Seen by Provider: 19 18:05


Source: RN notes reviewed, old records reviewed





- History of Present Illness


Initial comments: 





39-year-old female with past medical history significant for MI bypass multiple 

pulmonary embolisms and multiple DVTs.  Patient is on eliquis.  Patient arrived 

at [place] this morning with chest pain shortness of breath.  Patient had a CAT

scan at [facility] showed no PE.  Patient's initial troponin was 

negative.  They wanted the patient to come down reported be evaluated by 

cardiology.  Patient states she still having some mild chest pain and some 

shortness of breath.  Patient states at no time was she diaphoretic.  Patient 

denies any nausea patient patient had abdominal pain patient denies any headache

patient denies numbness weakness.  Patient denies any lightheadedness or 

dizziness.





- Related Data


                                Home Medications











 Medication  Instructions  Recorded  Confirmed


 


Apixaban [Eliquis] 5 mg PO BID 10/30/18 10/25/19


 


Metoprolol Tartrate 25 mg PO BID 02/18/19 10/25/19


 


Ranitidine HCl [Zantac] 150 mg PO HS 10/23/19 10/25/19


 


Rosuvastatin Calcium [Crestor] 5 mg PO DAILY 10/23/19 10/25/19








                                  Previous Rx's











 Medication  Instructions  Recorded


 


Doxycycline Monohydrate [Monodox] 100 mg PO Q12HR #20 cap 19











                                    Allergies











Allergy/AdvReac Type Severity Reaction Status Date / Time


 


adhesive Allergy  Rash/Hives Verified 19 18:21


 


albuterol Allergy  Anaphylaxis Verified 19 18:21


 


amoxicillin Allergy  Anaphylaxis Verified 19 18:21


 


ampicillin Allergy  Anaphylaxis Verified 19 18:21


 


azithromycin [From Zithromax] Allergy  Anaphylaxis Verified 19 18:21


 


erythromycin base Allergy  Anaphylaxis Verified 19 18:21


 


latex Allergy  Rash/Hives Verified 19 18:21


 


penicillin V Allergy  Anaphylaxis Verified 19 18:21














Review of Systems


ROS Statement: 


Those systems with pertinent positive or pertinent negative responses have been 

documented in the HPI.





ROS Other: All systems not noted in ROS Statement are negative.





Past Medical History


Past Medical History: Coronary Artery Disease (CAD), Chest Pain / Angina, Deep 

Vein Thrombosis (DVT), Hyperlipidemia, Myocardial Infarction (MI), Pulmonary 

Embolus (PE)


Last Myocardial Infarction Date:: 2018


History of Any Multi-Drug Resistant Organisms: MRSA


Date of last positivie culture/infection: 


MDRO Source:: abscess abdomen


Past Surgical History:  Section, Cholecystectomy, Coronary Bypass/CABG, 

Heart Catheterization


Additional Past Surgical History / Comment(s): right wrist surgery, carpal 

tunnel, MRSA S/P wound. left knee surgery


Past Anesthesia/Blood Transfusion Reactions: No Reported Reaction


Past Psychological History: No Psychological Hx Reported


Smoking Status: Current every day smoker


Past Alcohol Use History: None Reported


Past Drug Use History: None Reported





- Past Family History


  ** Mother


Family Medical History: Cancer, CVA/TIA, Diabetes Mellitus, Myocardial 

Infarction (MI), Renal Disease


Additional Family Medical History / Comment(s): uterine cancer, artificial 

valves





  ** Father


Additional Family Medical History / Comment(s): PAD





  ** Brother(s)


Family Medical History: Coronary Artery Disease (CAD), Diabetes Mellitus


Additional Family Medical History / Comment(s): 2 HEART STENTS, issue with heart

valve





  ** Sister(s)


Additional Family Medical History / Comment(s): psych issues





  ** Daughter(s)


Family Medical History: No Reported History





  ** Son(s)


Family Medical History: No Reported History





General Exam





- General Exam Comments


Initial Comments: 





GENERAL:


Patient is well-developed and well-nourished.  Patient is nontoxic and well-

hydrated and is in mild distress.





ENT:


Neck is soft and supple.  No significant lymphadenopathy is noted.  Oropharynx 

is clear.  Moist mucous membranes.  Neck has full range of motion without 

eliciting any pain. 





EYES:


The sclera were anicteric and conjunctiva were pink and moist.  Extraocular 

movements were intact and pupils were equal round and reactive to light.  Ey

elids were unremarkable.





PULMONARY:


Unlabored respirations.  Good breath sounds bilaterally.  No audible rales 

rhonchi or wheezing was noted.





CARDIOVASCULAR:


There is a regular rate and rhythm without any murmurs gallops or rubs.  





ABDOMEN:


Soft and nontender with normal bowel sounds.  





SKIN:


Skin is clear with no lesions or rashes and otherwise unremarkable.





NEUROLOGIC:


Patient is alert and oriented x3.  Cranial nerves II through XII are grossly 

intact.  Motor and sensory are also intact.  Normal speech, volume and content. 

Symmetrical smile.  





MUSCULOSKELETAL:


Normal extremities with adequate strength and full range of motion.  No lower 

extremity swelling or edema.  No calf tenderness.





LYMPHATICS:


No significant lymphadenopathy is noted





PSYCHIATRIC:


Normal psychiatric evaluation.  





Medical Decision Making





- Medical Decision Making





EKG shows normal sinus rhythm at 86 bpm PA interval 250 QRS is 84 QT interval 

376 QTC is 449.  Patient's EKG shows no ST segment elevation or depression or T 

wave abnormalities are noted.





I spoke with  she he agreed to admit the patient admitted the patient I wrote

admitting orders.





Disposition


Clinical Impression: 


 Unstable angina





Disposition: ADMITTED AS IP TO THIS HOSP


Referrals: 


Katy Tay MD [Primary Care Provider] - 1-2 days


Time of Disposition: 18:19

## 2019-12-13 VITALS — TEMPERATURE: 98.3 F | SYSTOLIC BLOOD PRESSURE: 111 MMHG | HEART RATE: 71 BPM | DIASTOLIC BLOOD PRESSURE: 73 MMHG

## 2019-12-13 LAB
ANION GAP SERPL CALC-SCNC: 5 MMOL/L
BASOPHILS # BLD AUTO: 0 K/UL (ref 0–0.2)
BASOPHILS NFR BLD AUTO: 0 %
BUN SERPL-SCNC: 10 MG/DL (ref 7–17)
CALCIUM SPEC-MCNC: 8.9 MG/DL (ref 8.4–10.2)
CHLORIDE SERPL-SCNC: 103 MMOL/L (ref 98–107)
CHOLEST SERPL-MCNC: 169 MG/DL (ref ?–200)
CO2 SERPL-SCNC: 29 MMOL/L (ref 22–30)
EOSINOPHIL # BLD AUTO: 0.2 K/UL (ref 0–0.7)
EOSINOPHIL NFR BLD AUTO: 2 %
ERYTHROCYTE [DISTWIDTH] IN BLOOD BY AUTOMATED COUNT: 4.24 M/UL (ref 3.8–5.4)
ERYTHROCYTE [DISTWIDTH] IN BLOOD: 13.2 % (ref 11.5–15.5)
GLUCOSE SERPL-MCNC: 109 MG/DL (ref 74–99)
HCT VFR BLD AUTO: 37.6 % (ref 34–46)
HDLC SERPL-MCNC: 37 MG/DL (ref 40–60)
HGB BLD-MCNC: 12.3 GM/DL (ref 11.4–16)
LDLC SERPL CALC-MCNC: 108 MG/DL (ref 0–99)
LYMPHOCYTES # SPEC AUTO: 2.6 K/UL (ref 1–4.8)
LYMPHOCYTES NFR SPEC AUTO: 40 %
MCH RBC QN AUTO: 29.1 PG (ref 25–35)
MCHC RBC AUTO-ENTMCNC: 32.8 G/DL (ref 31–37)
MCV RBC AUTO: 88.8 FL (ref 80–100)
MONOCYTES # BLD AUTO: 0.4 K/UL (ref 0–1)
MONOCYTES NFR BLD AUTO: 5 %
NEUTROPHILS # BLD AUTO: 3.3 K/UL (ref 1.3–7.7)
NEUTROPHILS NFR BLD AUTO: 49 %
PLATELET # BLD AUTO: 245 K/UL (ref 150–450)
POTASSIUM SERPL-SCNC: 3.7 MMOL/L (ref 3.5–5.1)
SODIUM SERPL-SCNC: 137 MMOL/L (ref 137–145)
TRIGL SERPL-MCNC: 120 MG/DL (ref ?–150)
WBC # BLD AUTO: 6.6 K/UL (ref 3.8–10.6)

## 2019-12-13 RX ADMIN — NICOTINE SCH PATCH: 14 PATCH, EXTENDED RELEASE TRANSDERMAL at 10:38

## 2019-12-13 RX ADMIN — NITROGLYCERIN SCH: 20 OINTMENT TOPICAL at 00:13

## 2019-12-13 RX ADMIN — NITROGLYCERIN SCH: 20 OINTMENT TOPICAL at 04:05

## 2019-12-13 RX ADMIN — APIXABAN SCH MG: 5 TABLET, FILM COATED ORAL at 12:04

## 2019-12-13 NOTE — P.CRDCN
History of Present Illness


History of present illness: 





HISTORY OF PRESENTING ILLNESS


This is a pleasant 39-year-old  female past medical history significant

for coronary artery disease status post single-vessel bypass, DVT and PE mainta

ined on long-term anticoagulation, dyslipidemia and hypertension.  She has also 

daily smoker.  She underwent bypass surgery in 2018.  Initially she 

underwent heart catheterization at [facility] revealing a lesion in the 

mid LAD with evidence of intrinsic dissection, subsequent thereafter she went 

back to the hospital with a significant rise in her troponin and was found to 

have a complete total occlusion of the LAD requiring LIMA to LAD.  Postprocedure

she developed DVT and PE with right ventricular enlargement.  She follows in the

office currently with Dr. Jc.  We've been asked to see her in consultation 

secondary to chest pain. she is seen and examined sitting up resting comfortably

in bed in no acute distress.  She states for the previous 2 days she has felt a 

pressure in the left precordial region.  There is no radiation to the arm, back,

neck or jaw.  It is associated with shortness of breath and fatigue.  She denies

palpitations, dizziness, nausea, vomiting or diaphoresis.  The pain is 

constantly tight like a pressure pushing on her chest and squeezing her heart 

with intermittent episodes of sharp stabbing pains when she takes in a deep 

breath.





DIAGNOSTICS


EKG reveals sinus mechanism.


CTA chest negative for PE.


Laboratory reviewed, WBC 6.6, hemoglobin 12.3, platelets 245, sodium 137, 

potassium 3.7, creatinine 0.1, cardiac enzymes negative 3, C-reactive protein 

61.5, .


Current cardiac medications include Eliquis 5 mg twice a day and metoprolol 25 

mg at bedtime.


Most recent echocardiogram obtained 2019 reveals preserved LV systolic 

function with ejection fraction 55-60%, septal wall motion delay consistent with

prior cardiac surgery. 





REVIEW OF SYSTEMS


At the time of my exam:


CONSTITUTIONAL: Denies fever or chills.


CARDIOVASCULAR: Complains of chest pain. Denies shortness of breath, orthopnea, 

PND or palpitations.


RESPIRATORY: Denies cough. 


GASTROINTESTINAL: Denies abdominal pain, diarrhea, constipation, nausea or 

vomiting.


MUSCULOSKELETAL: Denies myalgias.


NEUROLOGIC: Denies numbness, tingling or weakness.


ENDOCRINE: Denies fatigue, weight change,  polydipsia or polyurina.


GENITOURINARY: Denies burning, hematuria or urgency with micturation.


HEMATOLOGIC: Denies history of anemia or bleeding. 





PHYSICAL EXAMINATION


Blood pressure 111/67 heart rate 69 afebrile and maintaining oxygen saturation 

on room air.


CONSTITUTIONAL: No apparent distress. Mobidly obese. 


HEENT: Head is normocephalic. Pupils are equal, round. Sclerae anicteric. Mucous

membranes of the mouth are moist.  No JVD. No carotid bruit.


CHEST EXAMINATION: Lungs are clear to auscultation. No chest wall tenderness is 

noted on palpation or with deep breathing. 


HEART EXAMINATION: Regular rate and rhythm. S1, S2 heard. No murmurs, gallops or

rub. Distant heart sounds. 


ABDOMEN: Soft, nontender. Positive bowel sounds.


EXTREMITIES: 2+ peripheral pulses, no lower extremity edema and no calf 

tenderness.


NEUROLOGIC EXAMINATION: Patient is awake, alert and oriented x3. 





ASSESSMENT


Chest pain, atypical. An acute coronary event has been ruled out. Pleuritic 

features. 


History of coronary artery disease s/p bypass grafting with LIMA-LAD


History of PE and DVT on long term anti-coagulation


Dyslipidemia


Hypertension


Chronic nicotine dependence


Morbid obesity, BMI 50





PLAN


An acute coronary event has been ruled out. 


No PE on CTA from [place]. 


Check sedimentation rate and CRP. 


Perform dobutamine stress echo to assess for stress induced ischemia. 


Smoking cessation recommended. 


Initiate on atorvastatin 40 mg daily. 


Thank you kindly for this consultation. 








Nurse Practitioner note has been reviewed, I agree with a documented findings 

and plan of care.  Patient was seen and examined.











Past Medical History


Past Medical History: Coronary Artery Disease (CAD), Chest Pain / Angina, Deep 

Vein Thrombosis (DVT), Hyperlipidemia, Myocardial Infarction (MI), Pulmonary 

Embolus (PE)


Last Myocardial Infarction Date:: 2018


History of Any Multi-Drug Resistant Organisms: MRSA


Date of last positivie culture/infection: 


MDRO Source:: abscess abdomen


Past Surgical History:  Section, Cholecystectomy, Coronary Bypass/CABG, 

Heart Catheterization


Additional Past Surgical History / Comment(s): right wrist surgery, carpal 

tunnel, MRSA S/P wound. left knee surgery, had a miscarrage


Past Anesthesia/Blood Transfusion Reactions: No Reported Reaction


Smoking Status: Current some day smoker





- Past Family History


  ** Mother


Family Medical History: Cancer, CVA/TIA, Diabetes Mellitus, Myocardial 

Infarction (MI), Renal Disease


Additional Family Medical History / Comment(s): uterine cancer, artificial 

valves





  ** Father


Additional Family Medical History / Comment(s): PAD





  ** Brother(s)


Family Medical History: Coronary Artery Disease (CAD), Diabetes Mellitus


Additional Family Medical History / Comment(s): 2 HEART STENTS, issue with heart

valve





  ** Sister(s)


Additional Family Medical History / Comment(s): psych issues





  ** Daughter(s)


Family Medical History: No Reported History





  ** Son(s)


Family Medical History: No Reported History





Medications and Allergies


                                Home Medications











 Medication  Instructions  Recorded  Confirmed  Type


 


Apixaban [Eliquis] 5 mg PO BID 10/30/18 12/12/19 History


 


Metoprolol Tartrate 25 mg PO HS 19 History








                                    Allergies











Allergy/AdvReac Type Severity Reaction Status Date / Time


 


adhesive Allergy  Rash/Hives Verified 19 20:23


 


albuterol Allergy  Anaphylaxis Verified 19 20:23


 


amoxicillin Allergy  Anaphylaxis Verified 19 20:23


 


ampicillin Allergy  Anaphylaxis Verified 19 20:23


 


azithromycin [From Zithromax] Allergy  Anaphylaxis Verified 19 20:23


 


erythromycin base Allergy  Anaphylaxis Verified 19 20:23


 


latex Allergy  Rash/Hives Verified 19 20:23


 


morphine Allergy  Hallucinati Verified 19 20:23





   ons  


 


penicillin V Allergy  Anaphylaxis Verified 19 20:23














Physical Exam


Vitals: 


                                   Vital Signs











  Temp Pulse Pulse Resp BP BP Pulse Ox


 


 19 07:58  98.3 F   69  18   111/67  97


 


 19 04:00  99.1 F   76  18   110/74  96


 


 19 03:58     18   


 


 19 00:00     18   


 


 19 23:48  98.4 F   81  18   110/66  96


 


 19 20:00     18   


 


 19 19:59  98.2 F   82  19   111/82  96


 


 19 18:19  97.8 F  84   18  125/76   95








                                Intake and Output











 19





 22:59 06:59 14:59


 


Other:   


 


  Voiding Method   Toilet


 


  # Voids  1 


 


  Weight 128.367 kg  














Results





                                 19 00:33





                                 19 00:33


                                 Cardiac Enzymes











  19 Range/Units





  18:16 00:33 06:33 


 


Troponin I  <0.012  <0.012  <0.012  (0.000-0.034)  ng/mL








                                     Lipids











  19 Range/Units





  06:33 


 


Triglycerides  120  (<150)  mg/dL


 


Cholesterol  169  (<200)  mg/dL


 


HDL Cholesterol  37 L  (40-60)  mg/dL








                                       CBC











  19 Range/Units





  00:33 


 


WBC  6.6  (3.8-10.6)  k/uL


 


RBC  4.24  (3.80-5.40)  m/uL


 


Hgb  12.3  (11.4-16.0)  gm/dL


 


Hct  37.6  (34.0-46.0)  %


 


Plt Count  245  (150-450)  k/uL








                          Comprehensive Metabolic Panel











  19 Range/Units





  00:33 


 


Sodium  137  (137-145)  mmol/L


 


Potassium  3.7  (3.5-5.1)  mmol/L


 


Chloride  103  ()  mmol/L


 


Carbon Dioxide  29  (22-30)  mmol/L


 


BUN  10  (7-17)  mg/dL


 


Creatinine  0.81  (0.52-1.04)  mg/dL


 


Glucose  109 H  (74-99)  mg/dL


 


Calcium  8.9  (8.4-10.2)  mg/dL








                               Current Medications











Generic Name Dose Route Start Last Admin





  Trade Name Freq  PRN Reason Stop Dose Admin


 


Hydrocodone Bitart/Acetaminophen  1 each  19 21:01  19 05:16





  [place] 5-325  PO   1 each





  Q6HR PRN   Administration





  Pain  


 


Apixaban  5 mg  19 21:15  19 21:14





  Eliquis  PO   5 mg





  BID CHON   Administration


 


Aspirin  81 mg  19 09:00 





  Aspirin  PO  





  DAILY CHON  


 


Atorvastatin Calcium  40 mg  19 09:00 





  Lipitor  PO  





  DAILY [facility]  


 


Dobutamine HCl/Dextrose 500 mg  250 mls @ 38.51 mls/hr  19 08:12 





  / IV Solution  IV  19 14:41 





  .Q6H30M ONE  





  Protocol  





  10 MCG/KG/MIN  


 


Metoprolol Tartrate  25 mg  19 21:15  19 21:14





  Lopressor  PO   25 mg





  HS CHON   Administration


 


Nicotine  1 patch  19 21:15  19 21:14





  Habitrol 14mg/24hr Patch  TRANSDERM   1 patch





  DAILY CHON   Administration


 


Nitroglycerin  0.4 mg  19 18:19 





  Nitrostat  SUBLINGUAL  





  Q5M PRN  





  Chest Pain  








                                Intake and Output











 19





 22:59 06:59 14:59


 


Other:   


 


  Voiding Method   Toilet


 


  # Voids  1 


 


  Weight 128.367 kg  








                                        





                                 19 00:33 





                                 19 00:33

## 2019-12-13 NOTE — P.DS
Providers


Date of admission: 


12/12/19 18:19





Expected date of discharge: 12/13/19


Attending physician: 


Bijan Mcdonald MD





Consults: 





                                        





12/12/19 18:19


Consult Physician Urgent 


   Consulting Provider: [facility]


   Consult Reason/Comments: Unstable angina


   Do you want consulting provider notified?: Yes











Primary care physician: 


[facility] Course: 


39-year-old female with past medical history significant for MI bypass multiple 

pulmonary embolisms and multiple DVTs.  Patient is on eliquis.  Patient arrived 

at [facility] this morning with chest pain shortness of breath.  Patient had a CAT

scan at [facility] showed no PE.  Patient's initial troponin was 

negative.  They wanted the patient to come down reported be evaluated by 

cardiology.  Patient states she still having some mild chest pain and some 

shortness of breath.  Patient states at no time was she diaphoretic.  Patient 

denies any nausea patient patient had abdominal pain patient denies any headache

patient denies numbness weakness.  Patient denies any lightheadedness or 

dizziness.





EKG reveals sinus mechanism.


CTA chest negative for PE.


Laboratory reviewed, WBC 6.6, hemoglobin 12.3, platelets 245, sodium 137, 

potassium 3.7, creatinine 0.1, cardiac enzymes negative 3, C-reactive protein 

61.5, .


Current cardiac medications include Eliquis 5 mg twice a day and metoprolol 25 

mg at bedtime.


Most recent echocardiogram obtained October 2019 reveals preserved LV systolic 

function with ejection fraction 55-60%, septal wall motion delay consistent with

prior cardiac surgery. 





Cardiology saw patient and recommended dobutamine stress echo which was 

unremarkable; patient was started on colchicine with diagnosis of possible 

pericarditis and recommended outpatient follow-up with cardiology as an 

outpatient








Plan - Discharge Summary


New Discharge Prescriptions: 


New


   Aspirin 81 mg PO DAILY  chew


   Colchicine [Colcrys] 0.6 mg PO BID #14 each


   Atorvastatin [Lipitor] 40 mg PO DAILY #30 tab


   Doxycycline [Vibramycin] 100 mg PO BID #14 cap





No Action


   Apixaban [Eliquis] 5 mg PO BID


   Metoprolol Tartrate 25 mg PO HS


Discharge Medication List





Apixaban [Eliquis] 5 mg PO BID 10/30/18 [History]


Metoprolol Tartrate 25 mg PO HS 02/18/19 [History]


Aspirin 81 mg PO DAILY  chew 12/13/19 [Rx]


Atorvastatin [Lipitor] 40 mg PO DAILY #30 tab 12/13/19 [Rx]


Colchicine [Colcrys] 0.6 mg PO BID #14 each 12/13/19 [Rx]


Doxycycline [Vibramycin] 100 mg PO BID #14 cap 12/13/19 [Rx]








Follow up Appointment(s)/Referral(s): 


Eloise Jc MD [STAFF PHYSICIAN] - 2 Weeks


Katy Tay MD [Primary Care Provider] - 1-2 days

## 2019-12-13 NOTE — P.HPIM
History of Present Illness


H&P Date: 19


Chief Complaint: Chest pain








39-year-old female with past medical history significant for MI bypass multiple 

pulmonary embolisms and multiple DVTs.  Patient is on eliquis.  Patient arrived 

at [place] this morning with chest pain shortness of breath.  Patient had a CAT

scan at [facility] showed no PE.  Patient's initial troponin was 

negative.  They wanted the patient to come down reported be evaluated by 

cardiology.  Patient states she still having some mild chest pain and some 

shortness of breath.  Patient states at no time was she diaphoretic.  Patient 

denies any nausea patient patient had abdominal pain patient denies any headache

patient denies numbness weakness.  Patient denies any lightheadedness or 

dizziness.





EKG reveals sinus mechanism.


CTA chest negative for PE.


Laboratory reviewed, WBC 6.6, hemoglobin 12.3, platelets 245, sodium 137, 

potassium 3.7, creatinine 0.1, cardiac enzymes negative 3, C-reactive protein 

61.5, .


Current cardiac medications include Eliquis 5 mg twice a day and metoprolol 25 

mg at bedtime.


Most recent echocardiogram obtained 2019 reveals preserved LV systolic 

function with ejection fraction 55-60%, septal wall motion delay consistent with

prior cardiac surgery. 





Cardiology saw patient and recommended dobutamine stress echo which was 

unremarkable; patient was started on colchicine with diagnosis of possible 

pericarditis and recommended outpatient follow-up with cardiology as an 

outpatient





Review of Systems








REVIEW OF SYSTEMS: 


CONSTITUTIONAL: No fever, no malaise, no fatigue. 


HEENT: No recent visual problems or hearing problems. Denied any sore throat. 


CARDIOVASCULAR: No chest pain, orthopnea, PND, no palpitations, no syncope. 


PULMONARY: No shortness of breath, no cough, no hemoptysis. 


GASTROINTESTINAL: No diarrhea, no nausea, no vomiting, no abdominal pain. 


NEUROLOGICAL: No headaches, no weakness, no numbness. 


HEMATOLOGICAL: Denies any bleeding or petechiae. 


GENITOURINARY: Denies any burning micturition, frequency, or urgency. 


MUSCULOSKELETAL/RHEUMATOLOGICAL: Denies any joint pain, swelling, or any muscle 

pain. 


ENDOCRINE: Denies any polyuria or polydipsia. 





The rest of the 14-point review of systems is negative.








Past Medical History


Past Medical History: Coronary Artery Disease (CAD), Chest Pain / Angina, Deep 

Vein Thrombosis (DVT), Hyperlipidemia, Myocardial Infarction (MI), Pulmonary 

Embolus (PE)


Last Myocardial Infarction Date:: 2018


History of Any Multi-Drug Resistant Organisms: MRSA


Date of last positivie culture/infection: 


MDRO Source:: abscess abdomen


Past Surgical History:  Section, Cholecystectomy, Coronary Bypass/CABG, 

Heart Catheterization


Additional Past Surgical History / Comment(s): right wrist surgery, carpal 

tunnel, MRSA S/P wound. left knee surgery, had a miscarrage


Past Anesthesia/Blood Transfusion Reactions: No Reported Reaction


Smoking Status: Current some day smoker





- Past Family History


  ** Mother


Family Medical History: Cancer, CVA/TIA, Diabetes Mellitus, Myocardial 

Infarction (MI), Renal Disease


Additional Family Medical History / Comment(s): uterine cancer, artificial 

valves





  ** Father


Additional Family Medical History / Comment(s): PAD





  ** Brother(s)


Family Medical History: Coronary Artery Disease (CAD), Diabetes Mellitus


Additional Family Medical History / Comment(s): 2 HEART STENTS, issue with heart

valve





  ** Sister(s)


Additional Family Medical History / Comment(s): psych issues





  ** Daughter(s)


Family Medical History: No Reported History





  ** Son(s)


Family Medical History: No Reported History





Medications and Allergies


                                Home Medications











 Medication  Instructions  Recorded  Confirmed  Type


 


Apixaban [Eliquis] 5 mg PO BID 10/30/18 12/12/19 History


 


Metoprolol Tartrate 25 mg PO HS 19 History








                                    Allergies











Allergy/AdvReac Type Severity Reaction Status Date / Time


 


adhesive Allergy  Rash/Hives Verified 19 20:23


 


albuterol Allergy  Anaphylaxis Verified 19 20:23


 


amoxicillin Allergy  Anaphylaxis Verified 19 20:23


 


ampicillin Allergy  Anaphylaxis Verified 19 20:23


 


azithromycin [From Zithromax] Allergy  Anaphylaxis Verified 19 20:23


 


erythromycin base Allergy  Anaphylaxis Verified 19 20:23


 


latex Allergy  Rash/Hives Verified 19 20:23


 


morphine Allergy  Hallucinati Verified 19 20:23





   ons  


 


penicillin V Allergy  Anaphylaxis Verified 19 20:23














Physical Exam


Vitals: 


                                   Vital Signs











  Temp Pulse Pulse Resp BP BP Pulse Ox


 


 19 11:53  98.1 F   88  18   129/78  98


 


 19 07:58  98.3 F   69  18   111/67  97


 


 19 04:00  99.1 F   76  18   110/74  96


 


 19 03:58     18   


 


 19 00:00     18   


 


 19 23:48  98.4 F   81  18   110/66  96


 


 19 20:00     18   


 


 19 19:59  98.2 F   82  19   111/82  96


 


 19 18:19  97.8 F  84   18  125/76   95








                                Intake and Output











 19





 22:59 06:59 14:59


 


Other:   


 


  Voiding Method   Toilet


 


  # Voids  1 


 


  Weight 128.367 kg  

















PHYSICAL EXAMINATION: 





GENERAL: The patient is alert and oriented x3, not in any acute distress. Well 

developed, well nourished. 


HEENT: Pupils are round and equally reacting to light. EOMI. No scleral icterus.

No conjunctival pallor. Normocephalic, atraumatic. No pharyngeal erythema. No 

thyromegaly. 


CARDIOVASCULAR: S1 and S2 present. No murmurs, rubs, or gallops. 


PULMONARY: Chest is clear to auscultation, no wheezing or crackles. 


ABDOMEN: Soft, nontender, nondistended, normoactive bowel sounds. No palpable 

organomegaly. 


MUSCULOSKELETAL: No joint swelling or deformity.


EXTREMITIES: No cyanosis, clubbing, or pedal edema. 


NEUROLOGICAL: Gross neurological examination did not reveal any focal deficits. 


SKIN: No rashes. 














Results


CBC & Chem 7: 


                                 19 00:33





                                 19 00:33


Labs: 


                  Abnormal Lab Results - Last 24 Hours (Table)











  19 Range/Units





  00:33 06:33 06:33 


 


Glucose  109 H    (74-99)  mg/dL


 


C-Reactive Protein    61.5 H  (<10.0)  mg/L


 


LDL Cholesterol, Calc   108 H   (0-99)  mg/dL


 


HDL Cholesterol   37 L   (40-60)  mg/dL














Thrombosis Risk Factor Assmnt





- Choose All That Apply


Each Risk Factor Represents 3 Points: History of DVT/PE


Thrombosis Risk Factor Assessment Total Risk Factor Score: 3


Thrombosis Risk Factor Assessment Level: Moderate Risk





Assessment and Plan


Assessment: 


Chest pain, atypical. An acute coronary event has been ruled out. Pleuritic 

features; patient has been evaluated by cardiology and recommended dobutamine 

stress echo for stress-induced ischemia and is also started on atorvastatin 40 

mg daily; patient will be started on colchicine for possible pericarditis





History of coronary artery disease s/p bypass grafting with LIMA-LAD


History of PE and DVT on long term anti-coagulation


Dyslipidemia


Hypertension


Chronic nicotine dependence


Morbid obesity, BMI 50

## 2019-12-16 NOTE — ECHOS
STRESS ECHOCARDIOGRAM



INDICATION:

Chest pain.



AGE:

39



SEX:

F



HT:

63"



WT:

283



PROTOCOL:

Dobutamine stress echo



STAGE:



DURATION OF EXERCISE:



HEART RATE REST:

76



BLOOD PRESSURE REST:

160/82



MAXIMUM HEART RATE ACHIEVED:

152



MAXIMUM BLOOD PRESSURE:

214/103



85% MPHR:

155



100% MPHR:

182



METS:



STRESS DATA:

Heart rate 76, blood pressure is 160/82 mmHg.  Baseline EKG showed sinus rhythm.

Dobutamine infusion at a dose of 10 mcg/kg per minute was initiated and increased to 40

mcg/kg per minute per protocol.  Max heart rate was 152 which is about 83% of maximum

predicted heart rate.



Maximum blood pressure was 214/103 mmHg. Clinically the patient did not have no

symptoms.  The EKG did not show any significant ST or T-wave abnormalities concerning

for ischemia.



ECHOCARDIOGRAM IMAGES:

On echocardiogram images from parasternal long axis view, parasternal short axis view,

apical 4 chamber and apical 2 chamber view, were obtained as the baseline images, at

low dose dobutamine infusion, at peak heart rate as well as on recovery.  The

echocardiogram images did not show any evidence of wall motion abnormalities concerning

for ischemia.



CONCLUSION:

1. Normal EKG in response to dobutamine.

2. Normal echocardiogram in response to dobutamine.

3. Essentially normal dobutamine stress echocardiogram for the patient.





MMODL / IJN: [id number] / Job#: [id number]

## 2020-01-20 ENCOUNTER — HOSPITAL ENCOUNTER (EMERGENCY)
Dept: HOSPITAL 47 - EC | Age: 40
Discharge: HOME | End: 2020-01-20
Payer: COMMERCIAL

## 2020-01-20 VITALS — HEART RATE: 85 BPM | RESPIRATION RATE: 20 BRPM | DIASTOLIC BLOOD PRESSURE: 82 MMHG | SYSTOLIC BLOOD PRESSURE: 119 MMHG

## 2020-01-20 VITALS — TEMPERATURE: 97.8 F

## 2020-01-20 DIAGNOSIS — Z79.899: ICD-10-CM

## 2020-01-20 DIAGNOSIS — Z91.040: ICD-10-CM

## 2020-01-20 DIAGNOSIS — Z79.01: ICD-10-CM

## 2020-01-20 DIAGNOSIS — I25.119: ICD-10-CM

## 2020-01-20 DIAGNOSIS — L02.411: Primary | ICD-10-CM

## 2020-01-20 DIAGNOSIS — F17.200: ICD-10-CM

## 2020-01-20 DIAGNOSIS — I25.2: ICD-10-CM

## 2020-01-20 DIAGNOSIS — Z86.718: ICD-10-CM

## 2020-01-20 DIAGNOSIS — Z95.1: ICD-10-CM

## 2020-01-20 DIAGNOSIS — Z91.048: ICD-10-CM

## 2020-01-20 DIAGNOSIS — Z88.0: ICD-10-CM

## 2020-01-20 DIAGNOSIS — Z86.711: ICD-10-CM

## 2020-01-20 DIAGNOSIS — Z88.5: ICD-10-CM

## 2020-01-20 DIAGNOSIS — Z88.1: ICD-10-CM

## 2020-01-20 LAB
ANION GAP SERPL CALC-SCNC: 5 MMOL/L
BASOPHILS # BLD AUTO: 0 K/UL (ref 0–0.2)
BASOPHILS NFR BLD AUTO: 1 %
BUN SERPL-SCNC: 8 MG/DL (ref 7–17)
CALCIUM SPEC-MCNC: 9.2 MG/DL (ref 8.4–10.2)
CHLORIDE SERPL-SCNC: 107 MMOL/L (ref 98–107)
CO2 SERPL-SCNC: 27 MMOL/L (ref 22–30)
EOSINOPHIL # BLD AUTO: 0.2 K/UL (ref 0–0.7)
EOSINOPHIL NFR BLD AUTO: 2 %
ERYTHROCYTE [DISTWIDTH] IN BLOOD BY AUTOMATED COUNT: 4.42 M/UL (ref 3.8–5.4)
ERYTHROCYTE [DISTWIDTH] IN BLOOD: 13.3 % (ref 11.5–15.5)
GLUCOSE SERPL-MCNC: 107 MG/DL (ref 74–99)
HCT VFR BLD AUTO: 39.3 % (ref 34–46)
HGB BLD-MCNC: 13.2 GM/DL (ref 11.4–16)
LYMPHOCYTES # SPEC AUTO: 2.6 K/UL (ref 1–4.8)
LYMPHOCYTES NFR SPEC AUTO: 26 %
MCH RBC QN AUTO: 30 PG (ref 25–35)
MCHC RBC AUTO-ENTMCNC: 33.7 G/DL (ref 31–37)
MCV RBC AUTO: 89 FL (ref 80–100)
MONOCYTES # BLD AUTO: 0.3 K/UL (ref 0–1)
MONOCYTES NFR BLD AUTO: 3 %
NEUTROPHILS # BLD AUTO: 6.6 K/UL (ref 1.3–7.7)
NEUTROPHILS NFR BLD AUTO: 67 %
PLATELET # BLD AUTO: 291 K/UL (ref 150–450)
POTASSIUM SERPL-SCNC: 4.2 MMOL/L (ref 3.5–5.1)
SODIUM SERPL-SCNC: 139 MMOL/L (ref 137–145)
WBC # BLD AUTO: 9.8 K/UL (ref 3.8–10.6)

## 2020-01-20 PROCEDURE — 84703 CHORIONIC GONADOTROPIN ASSAY: CPT

## 2020-01-20 PROCEDURE — 80048 BASIC METABOLIC PNL TOTAL CA: CPT

## 2020-01-20 PROCEDURE — 85025 COMPLETE CBC W/AUTO DIFF WBC: CPT

## 2020-01-20 PROCEDURE — 86140 C-REACTIVE PROTEIN: CPT

## 2020-01-20 PROCEDURE — 96374 THER/PROPH/DIAG INJ IV PUSH: CPT

## 2020-01-20 PROCEDURE — 99283 EMERGENCY DEPT VISIT LOW MDM: CPT

## 2020-01-20 PROCEDURE — 36415 COLL VENOUS BLD VENIPUNCTURE: CPT

## 2020-01-20 PROCEDURE — 93005 ELECTROCARDIOGRAM TRACING: CPT

## 2020-01-20 NOTE — ED
General Adult HPI





- General


Chief complaint: Skin/Abscess/Foreign Body


Stated complaint: ruptured abcess under arm


Time Seen by Provider: 20 11:19


Source: patient, RN notes reviewed


Mode of arrival: ambulatory


Limitations: no limitations





- History of Present Illness


Initial comments: 





39-year-old female presents to the emergency department for cyst.  She states 

she has had an abscess under the right armpit for 2 months.  States that she has

had it drained here 4 times and it does not seem to be improving.  States it is 

draining on its own at home.  States she was on one course of doxycycline for 

this.  Denies fevers.  States that she called Dr. quiroga office for a appointment

and she was told she cannot get into April so to come to the emergency 

department to see him through the hospital. Patient has no other complaints at 

this time including shortness of breath, chest pain, abdominal pain, nausea or 

vomiting, headache, or visual changes.





- Related Data


                                Home Medications











 Medication  Instructions  Recorded  Confirmed


 


Apixaban [Eliquis] 5 mg PO BID 10/30/18 12/12/19


 


Metoprolol Tartrate 25 mg PO HS 19








                                  Previous Rx's











 Medication  Instructions  Recorded


 


Aspirin 81 mg PO DAILY  chew 19


 


Atorvastatin [Lipitor] 40 mg PO DAILY #30 tab 19


 


Colchicine [Colcrys] 0.6 mg PO BID #14 each 19


 


Doxycycline [Vibramycin] 100 mg PO BID #14 cap 19


 


Doxycycline [Vibramycin] 100 mg PO BID 10 Days #20 capsule 20











                                    Allergies











Allergy/AdvReac Type Severity Reaction Status Date / Time


 


adhesive Allergy  Rash/Hives Verified 20 11:00


 


albuterol Allergy  Anaphylaxis Verified 20 11:00


 


amoxicillin Allergy  Anaphylaxis Verified 20 11:00


 


ampicillin Allergy  Anaphylaxis Verified 20 11:00


 


azithromycin [From Zithromax] Allergy  Anaphylaxis Verified 20 11:00


 


erythromycin base Allergy  Anaphylaxis Verified 20 11:00


 


latex Allergy  Rash/Hives Verified 20 11:00


 


morphine Allergy  Hallucinati Verified 20 11:00





   ons  


 


penicillin V Allergy  Anaphylaxis Verified 20 11:00














Review of Systems


ROS Statement: 


Those systems with pertinent positive or pertinent negative responses have been 

documented in the HPI.





ROS Other: All systems not noted in ROS Statement are negative.





Past Medical History


Past Medical History: Coronary Artery Disease (CAD), Chest Pain / Angina, Deep 

Vein Thrombosis (DVT), Hyperlipidemia, Myocardial Infarction (MI), Pulmonary 

Embolus (PE)


Last Myocardial Infarction Date:: 2018


History of Any Multi-Drug Resistant Organisms: MRSA


Date of last positivie culture/infection: 


MDRO Source:: abscess abdomen


Past Surgical History:  Section, Cholecystectomy, Coronary Bypass/CABG, 

Heart Catheterization


Additional Past Surgical History / Comment(s): right wrist surgery, carpal 

tunnel, MRSA S/P wound. left knee surgery, had a miscarrage


Past Anesthesia/Blood Transfusion Reactions: No Reported Reaction


Past Psychological History: ADD/ADHD


Smoking Status: Current some day smoker


Past Alcohol Use History: Occasional


Past Drug Use History: None Reported





- Past Family History


  ** Mother


Family Medical History: Cancer, CVA/TIA, Diabetes Mellitus, Myocardial 

Infarction (MI), Renal Disease


Additional Family Medical History / Comment(s): uterine cancer, artificial 

valves





  ** Father


Additional Family Medical History / Comment(s): PAD





  ** Brother(s)


Family Medical History: Coronary Artery Disease (CAD), Diabetes Mellitus


Additional Family Medical History / Comment(s): 2 HEART STENTS, issue with heart

valve





  ** Sister(s)


Additional Family Medical History / Comment(s): psych issues





  ** Daughter(s)


Family Medical History: No Reported History





  ** Son(s)


Family Medical History: No Reported History





General Exam


Limitations: no limitations


General appearance: alert, in no apparent distress


Head exam: Present: atraumatic, normocephalic, normal inspection


Eye exam: Present: normal appearance, PERRL, EOMI.  Absent: scleral icterus, 

conjunctival injection, periorbital swelling


ENT exam: Present: normal exam, mucous membranes moist


Neck exam: Present: normal inspection, full ROM.  Absent: tenderness, 

meningismus, lymphadenopathy


Respiratory exam: Present: normal lung sounds bilaterally.  Absent: respiratory 

distress, wheezes, rales, rhonchi, stridor


Cardiovascular Exam: Present: regular rate, normal rhythm, normal heart sounds. 

Absent: systolic murmur, diastolic murmur, rubs, gallop, clicks


Extremities exam: Present: other (6 cm x 3 cm abscess noted in the right armpit.

 Slight drainage noted.  No cellulitis.)





Course


                                   Vital Signs











  20





  10:56


 


Temperature 97.8 F


 


Pulse Rate 106 H


 


Respiratory 18





Rate 


 


Blood Pressure 143/85


 


O2 Sat by Pulse 98





Oximetry 














Medical Decision Making





- Medical Decision Making





Vitals are stable.  Patient is afebrile.  She is about 6 x 3 cm abscess in the 

right axilla.  No surrounding cellulitis.  Patient states she did have this I&D 

and went away with exercise and for about a week but came back.  Denies fevers. 

Denies any streaking redness.  CBC BMP was unremarkable.  CRP 27.7 which is 

improved.  EKG shows a normal sinus rhythm.  He did recommend incision and 

drainage.  Patient adamantly refuses this stating she will not have this done 

again to the ER because it was too painful.  I did offer to numb this however 

she still refuses.  It is freely draining at this time.  I did offer to call on-

call surgeon the patient refuses stating she just wants to leave and would 

rather follow up with her doctor.  She will be restarted on doxycycline.  

Discussed warm compresses to keep to strain.  She will return here if she has 

any worsening symptoms or constitutional symptoms or fevers.





- Lab Data


Result diagrams: 


                                 20 12:33





                                 20 12:33


                                   Lab Results











  20 Range/Units





  12:33 12:33 12:33 


 


WBC   9.8   (3.8-10.6)  k/uL


 


RBC   4.42   (3.80-5.40)  m/uL


 


Hgb   13.2   (11.4-16.0)  gm/dL


 


Hct   39.3   (34.0-46.0)  %


 


MCV   89.0   (80.0-100.0)  fL


 


MCH   30.0   (25.0-35.0)  pg


 


MCHC   33.7   (31.0-37.0)  g/dL


 


RDW   13.3   (11.5-15.5)  %


 


Plt Count   291   (150-450)  k/uL


 


Neutrophils %   67   %


 


Lymphocytes %   26   %


 


Monocytes %   3   %


 


Eosinophils %   2   %


 


Basophils %   1   %


 


Neutrophils #   6.6   (1.3-7.7)  k/uL


 


Lymphocytes #   2.6   (1.0-4.8)  k/uL


 


Monocytes #   0.3   (0-1.0)  k/uL


 


Eosinophils #   0.2   (0-0.7)  k/uL


 


Basophils #   0.0   (0-0.2)  k/uL


 


Sodium  139    (137-145)  mmol/L


 


Potassium  4.2    (3.5-5.1)  mmol/L


 


Chloride  107    ()  mmol/L


 


Carbon Dioxide  27    (22-30)  mmol/L


 


Anion Gap  5    mmol/L


 


BUN  8    (7-17)  mg/dL


 


Creatinine  0.63    (0.52-1.04)  mg/dL


 


Est GFR (CKD-EPI)AfAm  >90    (>60 ml/min/1.73 sqM)  


 


Est GFR (CKD-EPI)NonAf  >90    (>60 ml/min/1.73 sqM)  


 


Glucose  107 H    (74-99)  mg/dL


 


Calcium  9.2    (8.4-10.2)  mg/dL


 


C-Reactive Protein  27.7 H    (<10.0)  mg/L


 


HCG, Qual    Not Detected  














Disposition


Clinical Impression: 


 Abscess





Disposition: HOME SELF-CARE


Condition: Good


Instructions (If sedation given, give patient instructions):  Abscess Incision 

and Drainage (ED), Abscess (ED)


Additional Instructions: 


Please take antibiotic as directed.  Please keep the area draining by applying 

warm compresses.  Follow-up with surgery and primary care.  Return to the 

emergency department for any worsening symptoms such as fevers or increased size

of abscess


Prescriptions: 


Doxycycline [Vibramycin] 100 mg PO BID 10 Days #20 capsule


Is patient prescribed a controlled substance at d/c from ED?: No


Referrals: 


Katy Tay MD [Primary Care Provider] - 1-2 days


Time of Disposition: 14:14

## 2020-06-17 ENCOUNTER — HOSPITAL ENCOUNTER (EMERGENCY)
Dept: HOSPITAL 47 - EC | Age: 40
Discharge: HOME | End: 2020-06-17
Payer: COMMERCIAL

## 2020-06-17 VITALS — TEMPERATURE: 98.4 F | SYSTOLIC BLOOD PRESSURE: 131 MMHG | DIASTOLIC BLOOD PRESSURE: 88 MMHG | HEART RATE: 84 BPM

## 2020-06-17 VITALS — RESPIRATION RATE: 18 BRPM

## 2020-06-17 DIAGNOSIS — Z91.048: ICD-10-CM

## 2020-06-17 DIAGNOSIS — R10.13: ICD-10-CM

## 2020-06-17 DIAGNOSIS — I25.119: ICD-10-CM

## 2020-06-17 DIAGNOSIS — Z88.8: ICD-10-CM

## 2020-06-17 DIAGNOSIS — Z79.899: ICD-10-CM

## 2020-06-17 DIAGNOSIS — Z88.5: ICD-10-CM

## 2020-06-17 DIAGNOSIS — Z86.718: ICD-10-CM

## 2020-06-17 DIAGNOSIS — K44.9: ICD-10-CM

## 2020-06-17 DIAGNOSIS — Z88.0: ICD-10-CM

## 2020-06-17 DIAGNOSIS — I25.2: ICD-10-CM

## 2020-06-17 DIAGNOSIS — F17.200: ICD-10-CM

## 2020-06-17 DIAGNOSIS — M79.89: Primary | ICD-10-CM

## 2020-06-17 DIAGNOSIS — Z91.040: ICD-10-CM

## 2020-06-17 DIAGNOSIS — Z86.14: ICD-10-CM

## 2020-06-17 DIAGNOSIS — Z95.1: ICD-10-CM

## 2020-06-17 DIAGNOSIS — Z79.01: ICD-10-CM

## 2020-06-17 DIAGNOSIS — Z88.1: ICD-10-CM

## 2020-06-17 LAB
ALBUMIN SERPL-MCNC: 4.4 G/DL (ref 3.5–5)
ALP SERPL-CCNC: 79 U/L (ref 38–126)
ALT SERPL-CCNC: 16 U/L (ref 4–34)
ANION GAP SERPL CALC-SCNC: 9 MMOL/L
APTT BLD: 22 SEC (ref 22–30)
AST SERPL-CCNC: 21 U/L (ref 14–36)
BASOPHILS # BLD AUTO: 0.1 K/UL (ref 0–0.2)
BASOPHILS NFR BLD AUTO: 1 %
BUN SERPL-SCNC: 16 MG/DL (ref 7–17)
CALCIUM SPEC-MCNC: 9.9 MG/DL (ref 8.4–10.2)
CHLORIDE SERPL-SCNC: 103 MMOL/L (ref 98–107)
CO2 SERPL-SCNC: 26 MMOL/L (ref 22–30)
D DIMER PPP FEU-MCNC: 0.42 MG/L FEU (ref ?–0.6)
EOSINOPHIL # BLD AUTO: 0.4 K/UL (ref 0–0.7)
EOSINOPHIL NFR BLD AUTO: 3 %
ERYTHROCYTE [DISTWIDTH] IN BLOOD BY AUTOMATED COUNT: 4.7 M/UL (ref 3.8–5.4)
ERYTHROCYTE [DISTWIDTH] IN BLOOD: 13.6 % (ref 11.5–15.5)
GLUCOSE SERPL-MCNC: 112 MG/DL (ref 74–99)
HCT VFR BLD AUTO: 42.8 % (ref 34–46)
HGB BLD-MCNC: 13.9 GM/DL (ref 11.4–16)
INR PPP: 0.9 (ref ?–1.2)
LYMPHOCYTES # SPEC AUTO: 3.7 K/UL (ref 1–4.8)
LYMPHOCYTES NFR SPEC AUTO: 31 %
MCH RBC QN AUTO: 29.5 PG (ref 25–35)
MCHC RBC AUTO-ENTMCNC: 32.5 G/DL (ref 31–37)
MCV RBC AUTO: 90.9 FL (ref 80–100)
MONOCYTES # BLD AUTO: 0.4 K/UL (ref 0–1)
MONOCYTES NFR BLD AUTO: 3 %
NEUTROPHILS # BLD AUTO: 7.3 K/UL (ref 1.3–7.7)
NEUTROPHILS NFR BLD AUTO: 61 %
PLATELET # BLD AUTO: 260 K/UL (ref 150–450)
POTASSIUM SERPL-SCNC: 3.9 MMOL/L (ref 3.5–5.1)
PROT SERPL-MCNC: 7.2 G/DL (ref 6.3–8.2)
PT BLD: 9.4 SEC (ref 9–12)
SODIUM SERPL-SCNC: 138 MMOL/L (ref 137–145)
WBC # BLD AUTO: 12 K/UL (ref 3.8–10.6)

## 2020-06-17 PROCEDURE — 80053 COMPREHEN METABOLIC PANEL: CPT

## 2020-06-17 PROCEDURE — 85025 COMPLETE CBC W/AUTO DIFF WBC: CPT

## 2020-06-17 PROCEDURE — 99284 EMERGENCY DEPT VISIT MOD MDM: CPT

## 2020-06-17 PROCEDURE — 85730 THROMBOPLASTIN TIME PARTIAL: CPT

## 2020-06-17 PROCEDURE — 71046 X-RAY EXAM CHEST 2 VIEWS: CPT

## 2020-06-17 PROCEDURE — 36415 COLL VENOUS BLD VENIPUNCTURE: CPT

## 2020-06-17 PROCEDURE — 84484 ASSAY OF TROPONIN QUANT: CPT

## 2020-06-17 PROCEDURE — 83690 ASSAY OF LIPASE: CPT

## 2020-06-17 PROCEDURE — 85610 PROTHROMBIN TIME: CPT

## 2020-06-17 PROCEDURE — 93005 ELECTROCARDIOGRAM TRACING: CPT

## 2020-06-17 PROCEDURE — 85379 FIBRIN DEGRADATION QUANT: CPT

## 2020-06-17 NOTE — US
EXAMINATION TYPE: US venous doppler duplex LE LT

 

DATE OF EXAM: 6/17/2020 9:05 PM

 

COMPARISON: US bilateral venous October 24, 2019

 

CLINICAL HISTORY: hx of blood clots, some noncompliance with thinner. Swelling and pain x 1 week. Hx 
chronic popliteal vein clot and PE. Patient is off Eloquis for 14 days before her dental procedure. 

 

SIDE PERFORMED: Left  

 

TECHNIQUE:  The lower extremity deep venous system is examined utilizing real time linear array sonog
arnaldo with graded compression, doppler sonography and color-flow sonography.

 

VESSELS IMAGED:

External Iliac Vein (EIV)

Common Femoral Vein

Deep Femoral Vein

Greater Saphenous Vein *

Femoral Vein

Popliteal Vein

Small Saphenous Vein *

Proximal Calf Veins

(* superficial vessels)

 

Limited due to large body habitus.

 

Left Leg: Patient unable to tolerate compression of distal femoral vein segment. There appear to be i
nternal echoes suggestive of chronic thrombus within the popliteal vein. Popliteal vein does appear t
o compress and to show color but with slight color defect.

 

 

IMPRESSION: Suboptimal study with suspected partial chronic occlusive thrombus distal superficial fem
oral vein. No convincing evidence for new acute DVT.

## 2020-06-17 NOTE — XR
EXAMINATION TYPE: XR chest 2V

 

DATE OF EXAM: 6/17/2020

 

COMPARISON: Chest CT from 3/19/2019. 2 view chest x-ray October 24, 2019. 

 

HISTORY: Chest and epigastric pain and discomfort.

 

 

TECHNIQUE:  Frontal and lateral views of the chest are obtained.

 

FINDINGS:  Overlying sternal wires are redemonstrated. There is no focal air space opacity, pleural e
ffusion, or pneumothorax seen.  The cardiac silhouette size remains enlarged.   The osseous structure
s are intact.

 

IMPRESSION:  Cardiomegaly without acute pulmonary process.

## 2020-06-17 NOTE — ED
Extremity Problem HPI





- General


Chief complaint: Extremity Problem,Nontraumatic


Stated complaint: L Leg Swelling


Time Seen by Provider: 20 20:23


Source: patient


Mode of arrival: ambulatory


Limitations: no limitations





- History of Present Illness


Initial comments: 


39-year-old female with history of CAD with previous CABG 2018, significant 

history of DVT/PE-pt is current smoker, patient is supposed to be on eliquis 

however states she is "terrible at taking the medication" and stopped taking 

them this week for a dental procedure but hasnt taken the medication in a total 

of 2 weeks. Patient presents to the ER today for cc of left leg swelling. Denies

calf or leg pain. Denies edema of the legs b/l.  Patient also admits on ROS to a

vague epigastric discomfort ongoing for the past 2-3 days in the upper abdomen, 

denies severe kate, chest pressure, jaw pain, arm pain, nausea, vomiting, 

diarrhea. Denies SOB or pain with deep inspiration. Patient states she has a 

chronic DVT of the left popliteal region. Patient presented to the ER today for 

cc of left leg swelling concerned for DVT. Denies numbness, tingling, loss of 

sensation, pallor or coolness of the extremity. 








- Related Data


                                Home Medications











 Medication  Instructions  Recorded  Confirmed


 


Apixaban [Eliquis] 5 mg PO BID 10/30/18 06/17/20


 


Metoprolol Tartrate 25 mg PO BID 19


 


Ranitidine HCl [Zantac] 150 mg PO HS PRN 20











                                    Allergies











Allergy/AdvReac Type Severity Reaction Status Date / Time


 


adhesive Allergy  Rash/Hives Verified 20 21:28


 


albuterol Allergy  Anaphylaxis Verified 20 21:28


 


amoxicillin Allergy  Anaphylaxis Verified 20 21:28


 


ampicillin Allergy  Anaphylaxis Verified 20 21:28


 


azithromycin [From Zithromax] Allergy  Anaphylaxis Verified 20 21:28


 


erythromycin base Allergy  Anaphylaxis Verified 20 21:28


 


latex Allergy  Rash/Hives Verified 20 21:28


 


morphine Allergy  Hallucinati Verified 20 21:28





   ons  


 


penicillin V Allergy  Anaphylaxis Verified 20 21:28














Review of Systems


ROS Statement: 


Those systems with pertinent positive or pertinent negative responses have been 

documented in the HPI.





ROS Other: All systems not noted in ROS Statement are negative.





Past Medical History


Past Medical History: Coronary Artery Disease (CAD), Chest Pain / Angina, Deep 

Vein Thrombosis (DVT), Hyperlipidemia, Myocardial Infarction (MI), Pulmonary 

Embolus (PE)


Last Myocardial Infarction Date:: 2018


History of Any Multi-Drug Resistant Organisms: MRSA


Date of last positivie culture/infection: 


MDRO Source:: abscess abdomen


Past Surgical History:  Section, Cholecystectomy, Coronary Bypass/CABG, 

Heart Catheterization


Additional Past Surgical History / Comment(s): right wrist surgery, carpal 

tunnel, MRSA S/P wound. left knee surgery, had a miscarrage


Past Anesthesia/Blood Transfusion Reactions: No Reported Reaction


Past Psychological History: ADD/ADHD


Smoking Status: Current every day smoker


Past Alcohol Use History: Occasional


Past Drug Use History: None Reported





- Past Family History


  ** Mother


Family Medical History: Cancer, CVA/TIA, Diabetes Mellitus, Myocardial 

Infarction (MI), Renal Disease


Additional Family Medical History / Comment(s): uterine cancer, artificial 

valves





  ** Father


Additional Family Medical History / Comment(s): PAD





  ** Brother(s)


Family Medical History: Coronary Artery Disease (CAD), Diabetes Mellitus


Additional Family Medical History / Comment(s): 2 HEART STENTS, issue with heart

valve





  ** Sister(s)


Additional Family Medical History / Comment(s): psych issues





  ** Daughter(s)


Family Medical History: No Reported History





  ** Son(s)


Family Medical History: No Reported History





General Exam





- General Exam Comments


Initial Comments: 


General:  The patient is awake and alert, in no distress, and does not appear 

acutely ill. 


Eye:  =3 mm pupils are equal, round and reactive to light, extra-ocular 

movements are intact.  No nystagmus.  There is normal conjunctiva bilaterally.  

No signs of icterus.  


Ears, nose, mouth and throat:  There are moist mucous membranes and no oral 

lesions. 


Neck:  The neck is supple, there is no tenderness or JVD.  


Cardiovascular:  There is a regular rate and rhythm. No murmur, rub or gallop is

appreciated.


Respiratory:  Lungs are clear to auscultation, respirations are non-labored, 

breath sounds are equal.  No wheezes, stridor, rales, or rhonchi.


Gastrointestinal:  Soft, non-distended,mild tenderness to palpation of the 

epigastric region of the abdomen without masses or organomegaly noted. There is 

no rebound or guarding present.  No CVA tenderness. Bowel sounds are 

unremarkable.


Musculoskeletal:  Normal ROM, no tenderness.  Strength 5/5. Sensation intact. 

Radial pulses equal bilaterally 2+.  


Neurological:  A&O x 3. CN II-XII intact grossly, There are no obvious motor or 

sensory deficits. Coordination appears grossly intact. Speech is normal.


Skin:  Skin is warm and dry and no rashes or lesions are noted. No pitting edema

or leg swelling appreciated on exam. mild pain to palpation of posterior knee.


Psychiatric:  Cooperative, appropriate mood & affect, normal judgment.  





Limitations: no limitations





Course


                                   Vital Signs











  20





  20:17 22:51


 


Temperature 98 F 98.4 F


 


Pulse Rate 91 84


 


Respiratory 18 18





Rate  


 


Blood Pressure 134/82 131/88


 


O2 Sat by Pulse 99 97





Oximetry  














Medical Decision Making





- Medical Decision Making


Dimer (-). US (-) for acute thrombosis, no swelling appreciated. Vague 

epigastric pain 2-3 days. Pt states she has had on and off for years and has 

been diagnosed with a hiatal hernia.  Denies any significant change denies any 

substernal chest pain jaw pain arm pain nausea. Patient EKG no acute findings. 

WIll be discharged with PCP f/u and return for worsening symptoms. Recommended 

immediately beginning her eliquis again and consulting PCP prior to dental 

procedure for alternatives such as a reversibel agent temporarily. Dr. Dobson 

attending is agreeable to this care plan and discharge at this time.








- Lab Data


Result diagrams: 


                                 20 20:42





                                 20 20:42


                                   Lab Results











  20 Range/Units





  20:42 20:42 20:42 


 


WBC  12.0 H    (3.8-10.6)  k/uL


 


RBC  4.70    (3.80-5.40)  m/uL


 


Hgb  13.9    (11.4-16.0)  gm/dL


 


Hct  42.8    (34.0-46.0)  %


 


MCV  90.9    (80.0-100.0)  fL


 


MCH  29.5    (25.0-35.0)  pg


 


MCHC  32.5    (31.0-37.0)  g/dL


 


RDW  13.6    (11.5-15.5)  %


 


Plt Count  260    (150-450)  k/uL


 


Neutrophils %  61    %


 


Lymphocytes %  31    %


 


Monocytes %  3    %


 


Eosinophils %  3    %


 


Basophils %  1    %


 


Neutrophils #  7.3    (1.3-7.7)  k/uL


 


Lymphocytes #  3.7    (1.0-4.8)  k/uL


 


Monocytes #  0.4    (0-1.0)  k/uL


 


Eosinophils #  0.4    (0-0.7)  k/uL


 


Basophils #  0.1    (0-0.2)  k/uL


 


PT   9.4   (9.0-12.0)  sec


 


INR   0.9   (<1.2)  


 


APTT   22.0   (22.0-30.0)  sec


 


D-Dimer   0.42   (<0.60)  mg/L FEU


 


Sodium    138  (137-145)  mmol/L


 


Potassium    3.9  (3.5-5.1)  mmol/L


 


Chloride    103  ()  mmol/L


 


Carbon Dioxide    26  (22-30)  mmol/L


 


Anion Gap    9  mmol/L


 


BUN    16  (7-17)  mg/dL


 


Creatinine    0.73  (0.52-1.04)  mg/dL


 


Est GFR (CKD-EPI)AfAm    >90  (>60 ml/min/1.73 sqM)  


 


Est GFR (CKD-EPI)NonAf    >90  (>60 ml/min/1.73 sqM)  


 


Glucose    112 H  (74-99)  mg/dL


 


Calcium    9.9  (8.4-10.2)  mg/dL


 


Total Bilirubin    0.2  (0.2-1.3)  mg/dL


 


AST    21  (14-36)  U/L


 


ALT    16  (4-34)  U/L


 


Alkaline Phosphatase    79  ()  U/L


 


Troponin I     (0.000-0.034)  ng/mL


 


Total Protein    7.2  (6.3-8.2)  g/dL


 


Albumin    4.4  (3.5-5.0)  g/dL


 


Lipase     ()  U/L














  20 Range/Units





  20:42 20:42 


 


WBC    (3.8-10.6)  k/uL


 


RBC    (3.80-5.40)  m/uL


 


Hgb    (11.4-16.0)  gm/dL


 


Hct    (34.0-46.0)  %


 


MCV    (80.0-100.0)  fL


 


MCH    (25.0-35.0)  pg


 


MCHC    (31.0-37.0)  g/dL


 


RDW    (11.5-15.5)  %


 


Plt Count    (150-450)  k/uL


 


Neutrophils %    %


 


Lymphocytes %    %


 


Monocytes %    %


 


Eosinophils %    %


 


Basophils %    %


 


Neutrophils #    (1.3-7.7)  k/uL


 


Lymphocytes #    (1.0-4.8)  k/uL


 


Monocytes #    (0-1.0)  k/uL


 


Eosinophils #    (0-0.7)  k/uL


 


Basophils #    (0-0.2)  k/uL


 


PT    (9.0-12.0)  sec


 


INR    (<1.2)  


 


APTT    (22.0-30.0)  sec


 


D-Dimer    (<0.60)  mg/L FEU


 


Sodium    (137-145)  mmol/L


 


Potassium    (3.5-5.1)  mmol/L


 


Chloride    ()  mmol/L


 


Carbon Dioxide    (22-30)  mmol/L


 


Anion Gap    mmol/L


 


BUN    (7-17)  mg/dL


 


Creatinine    (0.52-1.04)  mg/dL


 


Est GFR (CKD-EPI)AfAm    (>60 ml/min/1.73 sqM)  


 


Est GFR (CKD-EPI)NonAf    (>60 ml/min/1.73 sqM)  


 


Glucose    (74-99)  mg/dL


 


Calcium    (8.4-10.2)  mg/dL


 


Total Bilirubin    (0.2-1.3)  mg/dL


 


AST    (14-36)  U/L


 


ALT    (4-34)  U/L


 


Alkaline Phosphatase    ()  U/L


 


Troponin I  <0.012   (0.000-0.034)  ng/mL


 


Total Protein    (6.3-8.2)  g/dL


 


Albumin    (3.5-5.0)  g/dL


 


Lipase   271  ()  U/L














Disposition


Clinical Impression: 


 Left leg swelling, Epigastric pain





Disposition: HOME SELF-CARE


Condition: Good


Instructions (If sedation given, give patient instructions):  Leg Edema (ED), 

Abdominal Pain (ED)


Additional Instructions: 


Please use medication as discussed.  Please follow-up with family doctor in the 

next 2 days.  Please return to emergency room if the symptoms increase or worsen

or for any other concerns.


Is patient prescribed a controlled substance at d/c from ED?: No


Referrals: 


Katy Tay MD [Primary Care Provider] - 1-2 days


Time of Disposition: 21:56

## 2020-08-01 ENCOUNTER — HOSPITAL ENCOUNTER (OUTPATIENT)
Dept: HOSPITAL 47 - EC | Age: 40
Setting detail: OBSERVATION
LOS: 2 days | Discharge: HOME | End: 2020-08-03
Attending: HOSPITALIST | Admitting: HOSPITALIST
Payer: COMMERCIAL

## 2020-08-01 DIAGNOSIS — I25.2: ICD-10-CM

## 2020-08-01 DIAGNOSIS — Z86.14: ICD-10-CM

## 2020-08-01 DIAGNOSIS — E78.5: ICD-10-CM

## 2020-08-01 DIAGNOSIS — Z80.49: ICD-10-CM

## 2020-08-01 DIAGNOSIS — E66.01: ICD-10-CM

## 2020-08-01 DIAGNOSIS — F90.9: ICD-10-CM

## 2020-08-01 DIAGNOSIS — Z86.711: ICD-10-CM

## 2020-08-01 DIAGNOSIS — I25.10: ICD-10-CM

## 2020-08-01 DIAGNOSIS — Z88.5: ICD-10-CM

## 2020-08-01 DIAGNOSIS — F17.200: ICD-10-CM

## 2020-08-01 DIAGNOSIS — Z88.0: ICD-10-CM

## 2020-08-01 DIAGNOSIS — Z88.1: ICD-10-CM

## 2020-08-01 DIAGNOSIS — Z84.1: ICD-10-CM

## 2020-08-01 DIAGNOSIS — E78.1: ICD-10-CM

## 2020-08-01 DIAGNOSIS — Z98.890: ICD-10-CM

## 2020-08-01 DIAGNOSIS — Z79.01: ICD-10-CM

## 2020-08-01 DIAGNOSIS — R07.9: Primary | ICD-10-CM

## 2020-08-01 DIAGNOSIS — Z03.818: ICD-10-CM

## 2020-08-01 DIAGNOSIS — Z91.040: ICD-10-CM

## 2020-08-01 DIAGNOSIS — Z79.899: ICD-10-CM

## 2020-08-01 DIAGNOSIS — Z82.49: ICD-10-CM

## 2020-08-01 DIAGNOSIS — Z95.1: ICD-10-CM

## 2020-08-01 DIAGNOSIS — Z81.8: ICD-10-CM

## 2020-08-01 DIAGNOSIS — Z82.3: ICD-10-CM

## 2020-08-01 DIAGNOSIS — Z88.8: ICD-10-CM

## 2020-08-01 DIAGNOSIS — Z86.718: ICD-10-CM

## 2020-08-01 DIAGNOSIS — Z83.3: ICD-10-CM

## 2020-08-01 DIAGNOSIS — Z91.09: ICD-10-CM

## 2020-08-01 DIAGNOSIS — Z90.49: ICD-10-CM

## 2020-08-01 LAB
ANION GAP SERPL CALC-SCNC: 9 MMOL/L
APTT BLD: 23.3 SEC (ref 22–30)
BASOPHILS # BLD AUTO: 0.1 K/UL (ref 0–0.2)
BASOPHILS NFR BLD AUTO: 1 %
BUN SERPL-SCNC: 10 MG/DL (ref 7–17)
CALCIUM SPEC-MCNC: 9.7 MG/DL (ref 8.4–10.2)
CHLORIDE SERPL-SCNC: 105 MMOL/L (ref 98–107)
CO2 SERPL-SCNC: 23 MMOL/L (ref 22–30)
D DIMER PPP FEU-MCNC: 0.35 MG/L FEU (ref ?–0.6)
EOSINOPHIL # BLD AUTO: 0.3 K/UL (ref 0–0.7)
EOSINOPHIL NFR BLD AUTO: 3 %
ERYTHROCYTE [DISTWIDTH] IN BLOOD BY AUTOMATED COUNT: 5.29 M/UL (ref 3.8–5.4)
ERYTHROCYTE [DISTWIDTH] IN BLOOD: 13.3 % (ref 11.5–15.5)
GLUCOSE SERPL-MCNC: 103 MG/DL (ref 74–99)
HCT VFR BLD AUTO: 47.6 % (ref 34–46)
HGB BLD-MCNC: 15.3 GM/DL (ref 11.4–16)
INR PPP: 0.9 (ref ?–1.2)
LYMPHOCYTES # SPEC AUTO: 3.1 K/UL (ref 1–4.8)
LYMPHOCYTES NFR SPEC AUTO: 33 %
MAGNESIUM SPEC-SCNC: 1.9 MG/DL (ref 1.6–2.3)
MCH RBC QN AUTO: 28.9 PG (ref 25–35)
MCHC RBC AUTO-ENTMCNC: 32.1 G/DL (ref 31–37)
MCV RBC AUTO: 90 FL (ref 80–100)
MONOCYTES # BLD AUTO: 0.3 K/UL (ref 0–1)
MONOCYTES NFR BLD AUTO: 3 %
NEUTROPHILS # BLD AUTO: 5.4 K/UL (ref 1.3–7.7)
NEUTROPHILS NFR BLD AUTO: 59 %
PLATELET # BLD AUTO: 321 K/UL (ref 150–450)
POTASSIUM SERPL-SCNC: 4.3 MMOL/L (ref 3.5–5.1)
PT BLD: 9.5 SEC (ref 9–12)
SODIUM SERPL-SCNC: 137 MMOL/L (ref 137–145)
WBC # BLD AUTO: 9.2 K/UL (ref 3.8–10.6)

## 2020-08-01 PROCEDURE — 96375 TX/PRO/DX INJ NEW DRUG ADDON: CPT

## 2020-08-01 PROCEDURE — 83605 ASSAY OF LACTIC ACID: CPT

## 2020-08-01 PROCEDURE — 85730 THROMBOPLASTIN TIME PARTIAL: CPT

## 2020-08-01 PROCEDURE — 71046 X-RAY EXAM CHEST 2 VIEWS: CPT

## 2020-08-01 PROCEDURE — 93351 STRESS TTE COMPLETE: CPT

## 2020-08-01 PROCEDURE — 71275 CT ANGIOGRAPHY CHEST: CPT

## 2020-08-01 PROCEDURE — 84484 ASSAY OF TROPONIN QUANT: CPT

## 2020-08-01 PROCEDURE — 83880 ASSAY OF NATRIURETIC PEPTIDE: CPT

## 2020-08-01 PROCEDURE — 96365 THER/PROPH/DIAG IV INF INIT: CPT

## 2020-08-01 PROCEDURE — 99285 EMERGENCY DEPT VISIT HI MDM: CPT

## 2020-08-01 PROCEDURE — 80048 BASIC METABOLIC PNL TOTAL CA: CPT

## 2020-08-01 PROCEDURE — 85379 FIBRIN DEGRADATION QUANT: CPT

## 2020-08-01 PROCEDURE — 80061 LIPID PANEL: CPT

## 2020-08-01 PROCEDURE — 93306 TTE W/DOPPLER COMPLETE: CPT

## 2020-08-01 PROCEDURE — 93005 ELECTROCARDIOGRAM TRACING: CPT

## 2020-08-01 PROCEDURE — 85025 COMPLETE CBC W/AUTO DIFF WBC: CPT

## 2020-08-01 PROCEDURE — 85610 PROTHROMBIN TIME: CPT

## 2020-08-01 PROCEDURE — 83735 ASSAY OF MAGNESIUM: CPT

## 2020-08-01 PROCEDURE — 96376 TX/PRO/DX INJ SAME DRUG ADON: CPT

## 2020-08-01 PROCEDURE — 96366 THER/PROPH/DIAG IV INF ADDON: CPT

## 2020-08-01 PROCEDURE — 80306 DRUG TEST PRSMV INSTRMNT: CPT

## 2020-08-01 PROCEDURE — 74174 CTA ABD&PLVS W/CONTRAST: CPT

## 2020-08-01 PROCEDURE — 36415 COLL VENOUS BLD VENIPUNCTURE: CPT

## 2020-08-01 RX ADMIN — NICOTINE SCH PATCH: 14 PATCH, EXTENDED RELEASE TRANSDERMAL at 23:19

## 2020-08-01 RX ADMIN — HYDROMORPHONE HYDROCHLORIDE PRN MG: 1 INJECTION, SOLUTION INTRAMUSCULAR; INTRAVENOUS; SUBCUTANEOUS at 21:11

## 2020-08-01 RX ADMIN — PANTOPRAZOLE SODIUM SCH MG: 40 INJECTION, POWDER, FOR SOLUTION INTRAVENOUS at 23:19

## 2020-08-01 NOTE — ED
General Adult HPI





- General


Chief complaint: Chest Pain


Stated complaint: chest pain


Time Seen by Provider: 20 15:43


Source: patient


Mode of arrival: wheelchair


Limitations: no limitations





- History of Present Illness


Initial comments: 


Dictation was produced using dragon dictation software. please excuse any 

grammatical, word or spelling errors. 





This patient was cared for during a federal and state declared state of 

emergency secondary to Covid 19





Chief Complaint: 40-year-old female with past medical history of coronary artery

disease, coronary artery bypass grafting sensitive chest pain





History of Present Illness: 10-year-old female she has past medical history of 

pulmonary embolus, myocardial infarction, dyslipidemia, coronary artery bypass 

graft.  Patient states that she's been complaining of left-sided anterior chest 

pressure that has been ongoing since waking up this morning.  Patient describes 

it as a dull pressure that feels like squeezing since this morning.  Patient 

also complains of some tingling to the left hand and forearm.  Patient states 

the pain does slightly radiate to her back.  Denies any fever, chills or night 

sweats.  She states that it doesn't feel worse when she takes deep breath.  Does

not radiate to the jaw or shoulders.  No associated diaphoresis or nausea.  

Patient does have established care with cardiology.  She was urged by her 

daughter to come to the emergency department for evaluation.  She has pain being

worse with lying flat.  She does report that the pain does feel slightly worse 

however when she moves.








The ROS documented in this emergency department record has been reviewed and 

confirmed by me.  Those systems with pertinent positive or negative responses 

have been documented in the HPI.  All other systems are other negative and/or 

noncontributory.








PHYSICAL EXAM:


General Impression: Alert and oriented x3, not in acute distress


HEENT: Normocephalic atraumatic, extra-ocular movements intact, pupils equal and

reactive to light bilaterally, mucous membranes moist.


Cardiovascular: Heart regular rate and rhythm


Chest: Able to complete full sentences, no retractions, no tachypnea


Abdomen: abdomen soft, non-tender, non-distended, no organomegaly


Musculoskeletal: Pulses present and equal in all extremities, no peripheral 

edema


Motor:  no focal deficits noted


Neurological: CN II-XII grossly intact, no focal motor or sensory deficits noted


Skin: Intact with no visualized rashes


Psych: Normal affect and mood





ED course:40-year-old female presents with chest pain.  Patient has multiple 

risk factors and history of myocardial infarction.  She status post CABG.  Vital

signs upon arrival are within acceptable limits.  EKG appears to show no signs 

of ischemia or infarction.  EKG appears to be baseline compared to EKG from 

2020.  Patient's pain is atypical with typical features.  She is well-

appearing at bedside.Chart review was performed.  As recent as 2019 

patient had normal dobutamine stress echo.


After evaluation obtained.  CBC unremarkable.  Coag panel is negative.  D-dimer 

is negative.  Metabolic panel is negative.  Cardiac enzymes negative.  Chest x-

ray is nonacute.  Considering that patient had chest pain with radiation of the 

left upper extremity is concern for acute aortic pathology.  CT angio of the 

aorta is unremarkable.  Given patient's clinical presentation is consistent with

atypical chest pain typical features.  Patient does have history of myocardia 

infarction.  Patient be admitted for surgical intensive cardiology consultation.

 Patient's care was accepted by Dr. Rai of [facility] hospitalist group.





EKG interpretation: Ventricular rate 89, normal sinus rhythm,.  Interval 148, 

QRS 86, . No NE prolongation, no QTC prolongation, no ST or T-wave 

changes noted.  EKG compared to 2020 showing no changes.  Overall, this 

EKG is unremarkable











- Related Data


                                Home Medications











 Medication  Instructions  Recorded  Confirmed


 


Apixaban [Eliquis] 5 mg PO BID 10/30/18 06/17/20


 


Metoprolol Tartrate 25 mg PO BID 19


 


raNITIdine HCL [Zantac] 150 mg PO HS PRN 20











                                    Allergies











Allergy/AdvReac Type Severity Reaction Status Date / Time


 


adhesive Allergy  Rash/Hives Verified 20 15:37


 


albuterol Allergy  Anaphylaxis Verified 20 15:37


 


amoxicillin Allergy  Anaphylaxis Verified 20 15:37


 


ampicillin Allergy  Anaphylaxis Verified 20 15:37


 


azithromycin [From Zithromax] Allergy  Anaphylaxis Verified 20 15:37


 


erythromycin base Allergy  Anaphylaxis Verified 20 15:37


 


latex Allergy  Rash/Hives Verified 20 15:37


 


morphine Allergy  Hallucinati Verified 20 15:37





   ons  


 


penicillin V Allergy  Anaphylaxis Verified 20 15:37














Review of Systems


ROS Statement: 


Those systems with pertinent positive or pertinent negative responses have been 

documented in the HPI.





ROS Other: All systems not noted in ROS Statement are negative.





Past Medical History


Past Medical History: Coronary Artery Disease (CAD), Chest Pain / Angina, Deep 

Vein Thrombosis (DVT), Hyperlipidemia, Myocardial Infarction (MI), Pulmonary 

Embolus (PE)


Last Myocardial Infarction Date:: 2018


History of Any Multi-Drug Resistant Organisms: MRSA


Date of last positivie culture/infection: 


MDRO Source:: abscess abdomen


Past Surgical History:  Section, Cholecystectomy, Coronary Bypass/CABG, 

Heart Catheterization


Additional Past Surgical History / Comment(s): right wrist surgery, carpal 

tunnel, MRSA S/P wound. left knee surgery, had a miscarrage


Past Anesthesia/Blood Transfusion Reactions: No Reported Reaction


Past Psychological History: ADD/ADHD


Smoking Status: Current every day smoker


Past Alcohol Use History: None Reported, Occasional


Past Drug Use History: None Reported





- Past Family History


  ** Mother


Family Medical History: Cancer, CVA/TIA, Diabetes Mellitus, Myocardial 

Infarction (MI), Renal Disease


Additional Family Medical History / Comment(s): uterine cancer, artificial 

valves





  ** Father


Additional Family Medical History / Comment(s): PAD





  ** Brother(s)


Family Medical History: Coronary Artery Disease (CAD), Diabetes Mellitus


Additional Family Medical History / Comment(s): 2 HEART STENTS, issue with heart

valve





  ** Sister(s)


Additional Family Medical History / Comment(s): psych issues





  ** Daughter(s)


Family Medical History: No Reported History





  ** Son(s)


Family Medical History: No Reported History





General Exam


Limitations: no limitations





Course


                                   Vital Signs











  20





  15:38 16:15 17:22


 


Temperature 98.3 F  


 


Pulse Rate 88  68


 


Respiratory 18 16 18





Rate   


 


Blood Pressure 121/71  124/78


 


O2 Sat by Pulse 99  99





Oximetry   














Medical Decision Making





- Lab Data


Result diagrams: 


                                 20 16:30





                                 20 16:30


                                   Lab Results











  20 Range/Units





  16:20 16:30 16:30 


 


WBC   9.2   (3.8-10.6)  k/uL


 


RBC   5.29   (3.80-5.40)  m/uL


 


Hgb   15.3   (11.4-16.0)  gm/dL


 


Hct   47.6 H   (34.0-46.0)  %


 


MCV   90.0   (80.0-100.0)  fL


 


MCH   28.9   (25.0-35.0)  pg


 


MCHC   32.1   (31.0-37.0)  g/dL


 


RDW   13.3   (11.5-15.5)  %


 


Plt Count   321   (150-450)  k/uL


 


Neutrophils %   59   %


 


Lymphocytes %   33   %


 


Monocytes %   3   %


 


Eosinophils %   3   %


 


Basophils %   1   %


 


Neutrophils #   5.4   (1.3-7.7)  k/uL


 


Lymphocytes #   3.1   (1.0-4.8)  k/uL


 


Monocytes #   0.3   (0-1.0)  k/uL


 


Eosinophils #   0.3   (0-0.7)  k/uL


 


Basophils #   0.1   (0-0.2)  k/uL


 


PT    9.5  (9.0-12.0)  sec


 


INR    0.9  (<1.2)  


 


APTT    23.3  (22.0-30.0)  sec


 


D-Dimer    0.35  (<0.60)  mg/L FEU


 


Sodium     (137-145)  mmol/L


 


Potassium     (3.5-5.1)  mmol/L


 


Chloride     ()  mmol/L


 


Carbon Dioxide     (22-30)  mmol/L


 


Anion Gap     mmol/L


 


BUN     (7-17)  mg/dL


 


Creatinine     (0.52-1.04)  mg/dL


 


Est GFR (CKD-EPI)AfAm     (>60 ml/min/1.73 sqM)  


 


Est GFR (CKD-EPI)NonAf     (>60 ml/min/1.73 sqM)  


 


Glucose     (74-99)  mg/dL


 


Plasma Lactic Acid Alexx     (0.7-2.0)  mmol/L


 


Calcium     (8.4-10.2)  mg/dL


 


Magnesium     (1.6-2.3)  mg/dL


 


Troponin I     (0.000-0.034)  ng/mL


 


NT-Pro-B Natriuret Pep     pg/mL


 


Urine Opiates Screen  Detected H    (NotDetected)  


 


Ur Oxycodone Screen  Not Detected    (NotDetected)  


 


Urine Methadone Screen  Not Detected    (NotDetected)  


 


Ur Propoxyphene Screen  Not Detected    (NotDetected)  


 


Ur Barbiturates Screen  Not Detected    (NotDetected)  


 


U Tricyclic Antidepress  Not Detected    (NotDetected)  


 


Ur Phencyclidine Scrn  Not Detected    (NotDetected)  


 


Ur Amphetamines Screen  Not Detected    (NotDetected)  


 


U Methamphetamines Scrn  Not Detected    (NotDetected)  


 


U Benzodiazepines Scrn  Not Detected    (NotDetected)  


 


Urine Cocaine Screen  Not Detected    (NotDetected)  


 


U Marijuana (THC) Screen  Not Detected    (NotDetected)  














  20 Range/Units





  16:30 16:30 16:30 


 


WBC     (3.8-10.6)  k/uL


 


RBC     (3.80-5.40)  m/uL


 


Hgb     (11.4-16.0)  gm/dL


 


Hct     (34.0-46.0)  %


 


MCV     (80.0-100.0)  fL


 


MCH     (25.0-35.0)  pg


 


MCHC     (31.0-37.0)  g/dL


 


RDW     (11.5-15.5)  %


 


Plt Count     (150-450)  k/uL


 


Neutrophils %     %


 


Lymphocytes %     %


 


Monocytes %     %


 


Eosinophils %     %


 


Basophils %     %


 


Neutrophils #     (1.3-7.7)  k/uL


 


Lymphocytes #     (1.0-4.8)  k/uL


 


Monocytes #     (0-1.0)  k/uL


 


Eosinophils #     (0-0.7)  k/uL


 


Basophils #     (0-0.2)  k/uL


 


PT     (9.0-12.0)  sec


 


INR     (<1.2)  


 


APTT     (22.0-30.0)  sec


 


D-Dimer     (<0.60)  mg/L FEU


 


Sodium  137    (137-145)  mmol/L


 


Potassium  4.3    (3.5-5.1)  mmol/L


 


Chloride  105    ()  mmol/L


 


Carbon Dioxide  23    (22-30)  mmol/L


 


Anion Gap  9    mmol/L


 


BUN  10    (7-17)  mg/dL


 


Creatinine  0.68    (0.52-1.04)  mg/dL


 


Est GFR (CKD-EPI)AfAm  >90    (>60 ml/min/1.73 sqM)  


 


Est GFR (CKD-EPI)NonAf  >90    (>60 ml/min/1.73 sqM)  


 


Glucose  103 H    (74-99)  mg/dL


 


Plasma Lactic Acid Alexx   1.0   (0.7-2.0)  mmol/L


 


Calcium  9.7    (8.4-10.2)  mg/dL


 


Magnesium  1.9    (1.6-2.3)  mg/dL


 


Troponin I    <0.012  (0.000-0.034)  ng/mL


 


NT-Pro-B Natriuret Pep     pg/mL


 


Urine Opiates Screen     (NotDetected)  


 


Ur Oxycodone Screen     (NotDetected)  


 


Urine Methadone Screen     (NotDetected)  


 


Ur Propoxyphene Screen     (NotDetected)  


 


Ur Barbiturates Screen     (NotDetected)  


 


U Tricyclic Antidepress     (NotDetected)  


 


Ur Phencyclidine Scrn     (NotDetected)  


 


Ur Amphetamines Screen     (NotDetected)  


 


U Methamphetamines Scrn     (NotDetected)  


 


U Benzodiazepines Scrn     (NotDetected)  


 


Urine Cocaine Screen     (NotDetected)  


 


U Marijuana (THC) Screen     (NotDetected)  














  20 Range/Units





  16:30 


 


WBC   (3.8-10.6)  k/uL


 


RBC   (3.80-5.40)  m/uL


 


Hgb   (11.4-16.0)  gm/dL


 


Hct   (34.0-46.0)  %


 


MCV   (80.0-100.0)  fL


 


MCH   (25.0-35.0)  pg


 


MCHC   (31.0-37.0)  g/dL


 


RDW   (11.5-15.5)  %


 


Plt Count   (150-450)  k/uL


 


Neutrophils %   %


 


Lymphocytes %   %


 


Monocytes %   %


 


Eosinophils %   %


 


Basophils %   %


 


Neutrophils #   (1.3-7.7)  k/uL


 


Lymphocytes #   (1.0-4.8)  k/uL


 


Monocytes #   (0-1.0)  k/uL


 


Eosinophils #   (0-0.7)  k/uL


 


Basophils #   (0-0.2)  k/uL


 


PT   (9.0-12.0)  sec


 


INR   (<1.2)  


 


APTT   (22.0-30.0)  sec


 


D-Dimer   (<0.60)  mg/L FEU


 


Sodium   (137-145)  mmol/L


 


Potassium   (3.5-5.1)  mmol/L


 


Chloride   ()  mmol/L


 


Carbon Dioxide   (22-30)  mmol/L


 


Anion Gap   mmol/L


 


BUN   (7-17)  mg/dL


 


Creatinine   (0.52-1.04)  mg/dL


 


Est GFR (CKD-EPI)AfAm   (>60 ml/min/1.73 sqM)  


 


Est GFR (CKD-EPI)NonAf   (>60 ml/min/1.73 sqM)  


 


Glucose   (74-99)  mg/dL


 


Plasma Lactic Acid Alexx   (0.7-2.0)  mmol/L


 


Calcium   (8.4-10.2)  mg/dL


 


Magnesium   (1.6-2.3)  mg/dL


 


Troponin I   (0.000-0.034)  ng/mL


 


NT-Pro-B Natriuret Pep  66  pg/mL


 


Urine Opiates Screen   (NotDetected)  


 


Ur Oxycodone Screen   (NotDetected)  


 


Urine Methadone Screen   (NotDetected)  


 


Ur Propoxyphene Screen   (NotDetected)  


 


Ur Barbiturates Screen   (NotDetected)  


 


U Tricyclic Antidepress   (NotDetected)  


 


Ur Phencyclidine Scrn   (NotDetected)  


 


Ur Amphetamines Screen   (NotDetected)  


 


U Methamphetamines Scrn   (NotDetected)  


 


U Benzodiazepines Scrn   (NotDetected)  


 


Urine Cocaine Screen   (NotDetected)  


 


U Marijuana (THC) Screen   (NotDetected)  














Disposition


Clinical Impression: 


 Chest pain





Disposition: ADMITTED AS IP TO THIS [facility]


Condition: Fair


Referrals: 


Katy Tay MD [Primary Care Provider] - 1-2 days


Decision Time: 18:10

## 2020-08-01 NOTE — XR
EXAMINATION TYPE: XR chest 2V

 

DATE OF EXAM: 8/1/2020

 

COMPARISON: 6/17/2020

 

HISTORY: Epigastric pain

 

TECHNIQUE: 2 views

 

FINDINGS: Heart and mediastinum are normal. Lungs are clear. Diaphragm is normal. There are sternal w
ires. Bony thorax is intact. There is no pleural effusion.

 

IMPRESSION: No active cardiopulmonary disease. Normal heart. No change.

## 2020-08-01 NOTE — CT
EXAMINATION TYPE: CT angio thor/abd pel aorta

 

DATE OF EXAM: 8/1/2020

 

COMPARISON: 10/30/2018

 

HISTORY: Chest pain, history of CABG.

 

CT DLP: 2975.1 mGycm

Automated exposure control for dose reduction was used.

 

CONTRAST: 

Performed without and with IV Contrast, patient injected with 100ml mL of Isovue 370.

 

Images were obtained from the thoracic inlet to the floor the pelvis without and subsequently with IV
 contrast.

 

There are 3-D post processed images.

 

The lungs are clear of consolidation. There is minimal subsegmental atelectasis at the lung bases. He
art size is normal. There is no pericardial effusion. There is no mediastinal adenopathy. There are n
o hilar masses. Thoracic aorta is intact. There is no aneurysm or dissection. I see no filling defect
s in the pulmonary arteries.

 

Liver spleen pancreas appear normal. There are clips from cholecystectomy. Stomach appears intact.

 

Kidneys show satisfactory contrast opacification. There is no hydronephrosis. There is no adrenal mas
s. Ureters are not dilated. Bladder distends smoothly. There is no inguinal hernia. Uterus is antever
elliot. There is no pelvic mass. There is no free fluid in the pelvis. Appendix is posterior and appears
 normal. There is no mesenteric edema. There is no ascites or free air. There is no bowel obstruction
.

 

Abdominal aorta has normal size and contour. There is patency of the celiac artery and superior mesen
teric artery. There is bilateral wide patency of the renal arteries. There is bilateral arterial flow
 in the iliac and femoral arteries. There are sternal wires. Thoracic and lumbar spine appear intact.
 Bony pelvis is intact.

 

IMPRESSION:

Negative CT angiogram of the chest abdomen pelvis. No adverse change compared to old exam.

## 2020-08-02 LAB
ANION GAP SERPL CALC-SCNC: 5 MMOL/L
BASOPHILS # BLD AUTO: 0.1 K/UL (ref 0–0.2)
BASOPHILS NFR BLD AUTO: 1 %
BUN SERPL-SCNC: 12 MG/DL (ref 7–17)
CALCIUM SPEC-MCNC: 9.2 MG/DL (ref 8.4–10.2)
CHLORIDE SERPL-SCNC: 106 MMOL/L (ref 98–107)
CHOLEST SERPL-MCNC: 186 MG/DL (ref ?–200)
CO2 SERPL-SCNC: 27 MMOL/L (ref 22–30)
EOSINOPHIL # BLD AUTO: 0.3 K/UL (ref 0–0.7)
EOSINOPHIL NFR BLD AUTO: 3 %
ERYTHROCYTE [DISTWIDTH] IN BLOOD BY AUTOMATED COUNT: 4.72 M/UL (ref 3.8–5.4)
ERYTHROCYTE [DISTWIDTH] IN BLOOD: 13.5 % (ref 11.5–15.5)
GLUCOSE SERPL-MCNC: 110 MG/DL (ref 74–99)
HCT VFR BLD AUTO: 42.8 % (ref 34–46)
HDLC SERPL-MCNC: 36 MG/DL (ref 40–60)
HGB BLD-MCNC: 13.7 GM/DL (ref 11.4–16)
LDLC SERPL CALC-MCNC: 94 MG/DL (ref 0–99)
LYMPHOCYTES # SPEC AUTO: 2.7 K/UL (ref 1–4.8)
LYMPHOCYTES NFR SPEC AUTO: 37 %
MCH RBC QN AUTO: 29 PG (ref 25–35)
MCHC RBC AUTO-ENTMCNC: 32 G/DL (ref 31–37)
MCV RBC AUTO: 90.6 FL (ref 80–100)
MONOCYTES # BLD AUTO: 0.4 K/UL (ref 0–1)
MONOCYTES NFR BLD AUTO: 5 %
NEUTROPHILS # BLD AUTO: 3.9 K/UL (ref 1.3–7.7)
NEUTROPHILS NFR BLD AUTO: 53 %
PLATELET # BLD AUTO: 263 K/UL (ref 150–450)
POTASSIUM SERPL-SCNC: 4.3 MMOL/L (ref 3.5–5.1)
SODIUM SERPL-SCNC: 138 MMOL/L (ref 137–145)
TRIGL SERPL-MCNC: 279 MG/DL (ref ?–150)
WBC # BLD AUTO: 7.3 K/UL (ref 3.8–10.6)

## 2020-08-02 RX ADMIN — PANTOPRAZOLE SODIUM SCH MG: 40 TABLET, DELAYED RELEASE ORAL at 21:14

## 2020-08-02 RX ADMIN — HYDROMORPHONE HYDROCHLORIDE PRN MG: 1 INJECTION, SOLUTION INTRAMUSCULAR; INTRAVENOUS; SUBCUTANEOUS at 14:53

## 2020-08-02 RX ADMIN — METOPROLOL TARTRATE SCH MG: 25 TABLET, FILM COATED ORAL at 09:31

## 2020-08-02 RX ADMIN — METOPROLOL TARTRATE SCH MG: 25 TABLET, FILM COATED ORAL at 21:13

## 2020-08-02 RX ADMIN — NICOTINE SCH PATCH: 14 PATCH, EXTENDED RELEASE TRANSDERMAL at 09:31

## 2020-08-02 RX ADMIN — PANTOPRAZOLE SODIUM SCH MG: 40 INJECTION, POWDER, FOR SOLUTION INTRAVENOUS at 09:31

## 2020-08-02 NOTE — HP
HISTORY AND PHYSICAL



DATE OF SERVICE:

05/01/2020



CHIEF COMPLAINT:

Chest pain.



HISTORY OF PRESENT ILLNESS:

This 40-year-old woman with a past medical history of multiple medical problems

including CAD, history of DVT, history of myocardial infarction, pulmonary embolism,

history of MRSA, history of CAD/CABG, ADD/ADHD, being followed by Dr. Tay in the

outpatient setting was complaining of chest pain.  Initially the patient had pain under

the left breast after waking up this morning and this is pressure-like squeezing, some

associated tingling of the left hand was reported and the pain was also slightly

radiating to the back.  Subsequently, the patient had pain radiating to the front of

the chest in the epigastric area and lower part of the chest also.  The patient was

admitted for further evaluation and treatment.  Initial troponins are negative.  The

EKG showed diffuse ST-T changes.  The patient also had a thoracic aortic CT scan which

was negative. There is no history of fever, rigors.  No history of headache, loss of

consciousness, seizures.



PAST MEDICAL HISTORY:

CAD, DVT, myocardial infarction, pulmonary embolism, history of PVD.



MEDICATIONS ARE:

1. Metoprolol 25 mg b.i.d.

2. Eliquis 5 mg b.i.d.



ALLERGIES:

ADHESIVES, ALBUTEROL, AMOXICILLIN, AMPICILLIN, ZITHROMAX, ERYTHROMYCIN, LATEX, AND THE

MORPHINE.



FAMILY HISTORY:

History of kidney disease, myocardial infarction, diabetes, cancer.



SOCIAL HISTORY:

History of smoking.  No history of alcohol intake.



REVIEW OF SYSTEMS:

ENT No history of diminished hearing or vision.

CARDIOVASCULAR  As mentioned earlier.

RESPIRATORY As mentioned earlier.

GI As mentioned earlier.

 No dysuria or hematuria.

NERVOUS  No numbness or weakness.

ALLERGY/IMMUNOLOGY No asthma or hayfever.

MUSCULOSKELETAL As mentioned earlier.

HEMATOLOGY/ONCOLOGY Negative.

ENDOCRINE No history of diabetes or hypothyroidism.

CONSTITUTIONAL As  mentioned earlier.

DERMATOLOGY  Negative.

RHEUMATOLOGY Negative,

PSYCHIATRY As mentioned earlier



PHYSICAL EXAMINATION:

Alert and oriented x3.  Pulse 84, blood pressure (  ), respiration 18, temp is 97.9,

pulse ox 99% on room air.

HEENT:  Conjunctivae normal. Oral mucosa moist.

NECK:  No jugular venous distention.  No lymph node enlargement.

CARDIOVASCULAR:  S1, S2, muffled.  No S3, no S4,

RESPIRATORY: Diminished breath sounds at the bases. No rhonchi, no crackles.

ABDOMEN: Soft, nontender.  No mass palpable.

LEGS: No edema, no swelling.

NERVOUS SYSTEM: Higher functions mentioned earlier. Moves all four limbs. No focal

motor or sensory deficits.

LYMPHATICS: No lymph node in neck or axilla.

SKIN: No rash.

JOINTS: No active deforming arthropathy.



LABS:

CBC within normal.  Sodium 137.  Troponin noted.  X-ray and EKG noted.



ASSESSMENT:

1. Chest pain, possible unstable angina.

2. History of coronary artery disease, coronary artery bypass graft.

3. History of deep venous thrombosis.

4. History of pulmonary embolism.

5. History of hyperlipidemia.

6. History of ongoing nicotine dependence.

7. History of MRSA.

8. History of cholecystectomy.

9. History attention deficit disorder, attention deficit hyperactivity disorder.

10.Obesity with body mass index 51.5.



RECOMMENDATIONS AND DISCUSSION:

In this 40-year-old woman who presented with multiple complex medical issues, we will

monitor the patient closely, resume the home medications, angina protocol, cardiology

consult to rule out myocardial infarction.  Guarded prognosis because of multiple

complex medical issues and further recommendations to follow.  See orders for further

details.  We will initiate proton pump inhibitors also.





MMODL / IJN: [id number] / Job#: [id number]

## 2020-08-02 NOTE — PN
PROGRESS NOTE



DATE OF SERVICE:

08/02/2020



This 40-year-old woman was admitted with chest pain. She is being closely monitored.

Cardiology is planning cardiac catheterization tomorrow.  No chest pain.  No

palpitations.  No fever.



PHYSICAL EXAMINATION:

GENERAL: Patient is alert and oriented times three.

VITAL SIGNS:  Pulse 69, blood pressure 150/74, respirations 16, temperature 97.8, pulse

ox 93% on room air

HEENT:  Conjunctivae normal.

NECK:  No jugular venous distention.

RESPIRATORY: Breath sounds diminished at the bases. No rhonchi, no crackles.

HEART: S1 and S2, muffled.

ABDOMEN:  Soft, no tenderness. Obese.

EXTREMITIES:  No edema, no swelling.

NERVOUS: No focal deficits.



LABS:

Glucose 110. Triglycerides 279.



ASSESSMENT:

1. Chest pain, possible unstable angina.

2. History of coronary artery disease with coronary artery bypass graft.

3. History of deep vein thrombosis.

4. History of pulmonary embolus.

5. Hyperlipidemia and hypertriglyceridemia.

6. History of ongoing nicotine dependence.

7. History of Methicillin resistant Staphylococcus aureus.

8. History of cholecystectomy.

9. History of attention deficit hyperactivity disorder.

10.History of obesity with body mass index of 51.5.



RECOMMENDATIONS AND DISCUSSION:

Recommend to continue current medical management, monitoring and continue symptomatic

treatment. Otherwise continue with antiplatelet agents. Continue to closely monitor IV

heparin. Closely follow with Cardiology for possible cardiac catheterization.  Further

recommendations to follow.





ANNETTE / KHADIJAHN: [id number] / Job#: [id number]

## 2020-08-02 NOTE — CONS
CONSULTATION



Janis is a 40-year-old lady with history of coronary artery disease status post CABG,

history of DVT, who presented to the hospital complaining of chest pain.  She describes

it as a precordial chest pressure that did not radiate to neck, arm or back.  It is

unrelated to exertion and there was no diaphoresis.  She describes it as a pressure-

like sensation moderate intensity in the precordial area.  She came in with these

symptoms and the symptoms were going on for 1-2 days, but at the time of my evaluation,

she appears comfortable at rest and is free of symptoms.  The patient has known

coronary artery disease and underwent bypass surgery in the setting of dissection of an

LAD following angioplasty.  She had a negative stress test about 6-7 months ago.  On

this admission,  cardiac enzymes are negative.  EKG does not reveal acute ischemic

changes.



PAST MEDICAL HISTORY:

Significant for DVT, CAD status post coronary artery bypass grafting.



MEDICATIONS:

Include metoprolol 25 b.i.d., Eliquis 5 b.i.d.



ALLERGIES:

MULTIPLE DRUG ALLERGIES INCLUDING AMOXICILLIN. ZITHROMAX,  LATEX, PENICILLIN, AND

MORPHINE.



FAMILY HISTORY:

Is negative for premature coronary artery disease.



SOCIAL HISTORY:

Negative for current smoking, EtOH abuse, or drug abuse.



REVIEW OF SYSTEMS:

HEENT is unremarkable.  Cardiac as described above.  Respiratory as described above. GI

negative. GENITOURINARY negative.  ALLERGY/IMMUNOLOGY: Negative.  Skin negative.

Musculoskeletal negative. Endocrine negative.  Derm negative. CONSTITUTIONAL: Negative.

Oncological: Negative.



PHYSICAL EXAMINATION:

On exam comfortable at rest.  Vital signs are stable.  O2 saturation is 95% on room

air.  There is no jugular venous distention.  Carotid upstroke is normal.  There is no

bruit.  Chest exam reveals good air entry bilaterally.  Heart exam reveals first and

second heart sounds.  No gallop.  No murmur.  No rub.  Abdomen is soft, nontender.

Exam of extremities did not reveal any edema.  Peripheral pulses are felt.  CNS exam

did not reveal focal neurological deficits.



LABORATORY DATA:

Lab show a hemoglobin of 13.7, platelet count is 260.  Potassium is 4.3, creatinine is

0.7.  Troponins are negative.  BNP is normal.



ASSESSMENT:

1. Precordial chest pain in a patient with known coronary artery disease, status post

    prior bypass surgery.

2. History of deep vein thrombosis.

The patient is currently on Eliquis.  We will stop it. Start the patient on IV heparin

and talk to Dr. APOLLO Jc about doing a cardiac catheterization given the 2nd

hospitalization with chest pain within the last 6 months and a negative stress test 6

months ago.





MMINAL / IJN: [id number] / Job#: [id number]

## 2020-08-03 VITALS — RESPIRATION RATE: 16 BRPM | TEMPERATURE: 97.7 F

## 2020-08-03 VITALS — DIASTOLIC BLOOD PRESSURE: 61 MMHG | SYSTOLIC BLOOD PRESSURE: 98 MMHG | HEART RATE: 70 BPM

## 2020-08-03 LAB
ANION GAP SERPL CALC-SCNC: 6 MMOL/L
BASOPHILS # BLD AUTO: 0 K/UL (ref 0–0.2)
BASOPHILS NFR BLD AUTO: 1 %
BUN SERPL-SCNC: 15 MG/DL (ref 7–17)
CALCIUM SPEC-MCNC: 8.8 MG/DL (ref 8.4–10.2)
CHLORIDE SERPL-SCNC: 106 MMOL/L (ref 98–107)
CO2 SERPL-SCNC: 23 MMOL/L (ref 22–30)
EOSINOPHIL # BLD AUTO: 0.2 K/UL (ref 0–0.7)
EOSINOPHIL NFR BLD AUTO: 3 %
ERYTHROCYTE [DISTWIDTH] IN BLOOD BY AUTOMATED COUNT: 4.59 M/UL (ref 3.8–5.4)
ERYTHROCYTE [DISTWIDTH] IN BLOOD: 13.3 % (ref 11.5–15.5)
GLUCOSE SERPL-MCNC: 117 MG/DL (ref 74–99)
HCT VFR BLD AUTO: 42 % (ref 34–46)
HGB BLD-MCNC: 13.6 GM/DL (ref 11.4–16)
LYMPHOCYTES # SPEC AUTO: 3.6 K/UL (ref 1–4.8)
LYMPHOCYTES NFR SPEC AUTO: 43 %
MCH RBC QN AUTO: 29.6 PG (ref 25–35)
MCHC RBC AUTO-ENTMCNC: 32.4 G/DL (ref 31–37)
MCV RBC AUTO: 91.5 FL (ref 80–100)
MONOCYTES # BLD AUTO: 0.3 K/UL (ref 0–1)
MONOCYTES NFR BLD AUTO: 4 %
NEUTROPHILS # BLD AUTO: 4.1 K/UL (ref 1.3–7.7)
NEUTROPHILS NFR BLD AUTO: 49 %
PLATELET # BLD AUTO: 260 K/UL (ref 150–450)
POTASSIUM SERPL-SCNC: 3.9 MMOL/L (ref 3.5–5.1)
SODIUM SERPL-SCNC: 135 MMOL/L (ref 137–145)
WBC # BLD AUTO: 8.3 K/UL (ref 3.8–10.6)

## 2020-08-03 RX ADMIN — HYDROMORPHONE HYDROCHLORIDE PRN MG: 1 INJECTION, SOLUTION INTRAMUSCULAR; INTRAVENOUS; SUBCUTANEOUS at 03:30

## 2020-08-03 RX ADMIN — PANTOPRAZOLE SODIUM SCH MG: 40 TABLET, DELAYED RELEASE ORAL at 09:06

## 2020-08-03 RX ADMIN — NICOTINE SCH PATCH: 14 PATCH, EXTENDED RELEASE TRANSDERMAL at 12:20

## 2020-08-03 RX ADMIN — METOPROLOL TARTRATE SCH: 25 TABLET, FILM COATED ORAL at 09:20

## 2020-08-03 NOTE — P.STRESS
- Stress Test Note


Stress Test Results/Findings: 


Exam Performed:  dobutamine stress echo with con


Exam Date: 08/03/20


Reason for Exam: CHEST PAIN





Height: 5 ft 3 in


Weight: 131.995 kg


Protocol: DSE


Stage: 5


Duration of Exercise: 14:30





Resting Heart Rate: 72


Resting Blood Pressure: 190/71


Maximum Achieved Heart Rate: 146


Maximum Achieved Blood Pressure: 190/71


85% PMHR: 154


100% PMHR: 181


METS: NA


Technologist Comment: 





Stress Test Results/Findings: 


Baseline heart rate 72 beats a minute, Baseline blood pressure 190/71 mmHg line 

baseline 12-lead ECG shows sinus rhythm with normal ST segments


Patient received dobutamine infusion per protocol


No ST segment abnormalities


No arrhythmias


Peak heart rate 146 beats a minute.  Her pressure 136/65 mmHg


The baseline 2-D echo images were suboptimal and Definity contrast was used


There was stepwise increment in overall LV contractility without development of 

any wall motion abnormalities


@Recovery regional global LV systolic function remained normal





Impression


No ECG or echocardiographic evidence for ischemia

## 2020-08-03 NOTE — P.PN
Subjective





This is a pleasant 40-year-old  female past medical history significant

for coronary artery disease with history of LAD dissection and single bypass 

graft, history of DVT on long-term anticoagulation, dyslipidemia, chronic 

nicotine dependence and morbid obesity.  She underwent bypass surgery February 2018.  Initially she underwent heart catheterization at [facility] 

revealing a lesion in the mid LAD with evidence of intrinsic dissection, 

subsequently thereafter she went back to the hospital with significant rise in 

her troponin and was found to have a complete total occlusion of the LAD 

requiring LIMA to LAD.  Postprocedure she developed DVT and PE with RV 

enlargement.  She follows in the office with Dr. Jc.  She is seen and 

examined resting comfortably in no acute distress.  She continues to have chest 

discomfort that she states has been constant.  Her discomfort is reproducible on

light palpation in the midsternal region.  105/65 heart rate 65 afebrile 

maintaining oxygen saturation on room air.  Laboratory data reviewed, CBC 

unremarkable, sodium 135, potassium 3.9, creatinine 0.64, LDL 94 and HDL 36.  

Currently maintained on aspirin 81 mg daily and metoprolol 25 mg twice a day. 

Eliquis has been held and heparin infusion ongoing. 





GENERAL: Well-appearing, well-nourished and in no acute distress.


NECK: Supple without JVD or thyromegaly.


LUNGS: Breath sounds clear to auscultation bilaterally. Respiration equal and 

unlabored.  No wheezes, rales or rhonchi.


HEART: Regular rate and rhythm without murmurs, rubs or gallops. S1 and S2 

heard.  Reproducible pain in the anterior midsternal region.


EXTREMITIES: Normal range of motion, no edema.  No clubbing or cyanosis. 

Peripheral pulses intact.





ASSESSMENT


Chest pain, atypical and reproducible. An acute event has been ruled out. 


Coronary artery disease s/p bypass grafting 2/2018


History of PE and DVT maintained on eliquis


Dyslipidemia


Chronic nicotine dependence





PLAN


Initiate atorvastatin 40 mg daily. Decrease aspirin to 81 mg daily. Discontinue 

heparin infusion and resume eliquis tonight at 5 mg. 


Recommend repeating her stress test to assess for stress induced ischemia. If 

normal she is stable to be discharged and follow up with Dr. Jc in the office

in 2 weeks. 


Smoking cessation recommended.





Nurse Practitioner note has been reviewed, I agree with a documented findings 

and plan of care.  Patient was seen and examined.








Objective





- Vital Signs


Vital signs: 


                                   Vital Signs











Temp  97.7 F   08/03/20 08:57


 


Pulse  65   08/03/20 08:57


 


Resp  16   08/03/20 08:57


 


BP  105/65   08/03/20 08:57


 


Pulse Ox  96   08/03/20 08:57








                                 Intake & Output











 08/02/20 08/03/20 08/03/20





 18:59 06:59 18:59


 


Intake Total 468.886  


 


Balance 468.886  


 


Intake:   


 


  Intake, IV Titration 68.886  





  Amount   


 


    Heparin Sod,Pork in 0.45% 68.886  





    NaCl 25,000 unit In 0.45   





    % NaCl 1 250ml.bag @ 7.6   





    UNITS/KG/HR 10.032 mls/hr   





    IV .Q24H CHON Rx#:   





    [id number]   


 


  Oral 400  


 


Other:   


 


  Voiding Method Toilet Toilet Toilet


 


  # Voids 3 1 2














- Labs


CBC & Chem 7: 


                                 08/03/20 02:00





                                 08/03/20 02:00


Labs: 


                  Abnormal Lab Results - Last 24 Hours (Table)











  08/02/20 08/03/20 08/03/20 Range/Units





  17:45 02:00 02:00 


 


APTT  37.0 H   49.9 H  (22.0-30.0)  sec


 


Sodium   135 L   (137-145)  mmol/L


 


Glucose   117 H   (74-99)  mg/dL

## 2020-08-03 NOTE — ECHOF
Referral Reason:chest pain



MEASUREMENTS

--------

HEIGHT: 160.0 cm

WEIGHT: 132.0 kg

BP: 190/71

IVSd:   1.3 cm     (0.6 - 1.1)

LVIDd:   4.8 cm     (3.9 - 5.3)

LVPWd:   1.5 cm     (0.6 - 1.1)

IVSs:   1.8 cm

LVIDs:   3.4 cm

LVPWs:   1.7 cm

RVIDd:   3.6 cm     (< 3.3)

LAESV Index (A-L):   18.74 ml/m

Ao Diam:   2.7 cm     (2.0 - 3.7)

AV Cusp:   2.1 cm     (1.5 - 2.6)

MV E Karthik:   1.05 m/s

MV DecT:   159 ms

MV A Karthik:   0.88 m/s

MV E/A Ratio:   1.19 

RAP:   5.00 mmHg

RVSP:   36.24 mmHg







FINDINGS

--------

Sinus rhythm.

This was a technically difficult study with suboptimal views.

The left ventricular size is normal.   There is mild concentric left ventricular hypertrophy.   Overa
ll left ventricular systolic function is low-normal with, an EF between 50 - 55 %.   Septal wall cinthia
on is delayed and consistent with prior cardiac surgery.   Apical septum LV wall motion is hypokineti
c.

The right ventricle is mildly enlarged.

Normal LA  size by volume 22+/-6 ml/m2.

The right atrium was not well visualized.

5.0mg of Lumason was utilized for enhancement of images

Interatrial and interventricular septum intact.

The aortic valve was not well visualized.   There is no evidence of aortic regurgitation.   There is 
no evidence of aortic stenosis.

No mitral regurgitation.

Mild tricuspid regurgitation present.   There is mild pulmonary hypertension.   The right ventricular
 systolic pressure, as measured by Doppler, is 36.24mmHg.

The pulmonic valve was not well visualized.

The aortic root size is normal.

IVC Not well visulized.

There is no pericardial effusion.



CONCLUSIONS

--------

1. The left ventricular size is normal.

2. There is mild concentric left ventricular hypertrophy.

3. Overall left ventricular systolic function is low-normal with, an EF between 50 - 55 %.

4. Septal wall motion is delayed and consistent with prior cardiac surgery.

5. Apical septum LV wall motion is hypokinetic.

6. The right ventricle is mildly enlarged.

7. Mild tricuspid regurgitation present.

8. There is mild pulmonary hypertension.

9. The right ventricular systolic pressure, as measured by Doppler, is 36.24mmHg.





SONOGRAPHER: Marry Iniguez RDCS

## 2020-08-04 NOTE — P.DS
Providers


Date of admission: 


08/01/20 18:11





Expected date of discharge: 08/03/20


Attending physician: 


Catherine Rai





Consults: 





                                        





08/01/20 18:11


Consult Physician Urgent 


   Consulting Provider: Leoncio Allen


   Consult Reason/Comments: chest pain


   Do you want consulting provider notified?: Yes











Primary care physician: 


Katy Tay





Hospital Course: 





Final diagnosis





Chest pain, possible unstable angina


history of coronary artery disease with coronary artery bypass graft


History of deep vein thrombosis


History of pulmonary embolus


Hyperlipidemia and hypertriglyceridemia


History of ongoing nicotine dependence


History of MRSA


History of cholecystectomy


History of attention deficit hyperactivity disorder


History of obesity with a body mass index of 51.5.





Discharge disposition


Patient is being discharged in a stable condition with guarded prognosis to 

home.  Patient will follow-up with Dr. Tay in the outpatient setting upon 

discharge.  Patient also instructed to follow-up with Dr. TRISTAN Jc in the 

outpatient setting.  Patient will continue with aspirin low-dose along with 

imdur, Lipitor, Eliquis, and metoprolol upon discharge.  Total time taken is 

greater than 35 minutes.





History of present illness


This is a 40-year-old female who was recently admitted with chest pain and was 

being closely monitored.  Patient was seen and evaluated by cardiology initially

recommending cardiac catheterization although continue with stress test today.  

Patient underwent an echo showing mild concentric left ventricular hypertrophy 

with overall left ventricular systolic function is low to normal with an EF 

between 50 and 55%, septal wall motion is delayed and consistent with prior 

cardiac surgery, apical septum LV wall motion is hypokinetic in the right 

ventricle is mildly enlarged.  Mild tricuspid regurgitation present along with 

mild pulmonary hypertension with no pericardial effusion noted.  Patient 

underwent dobutamine stress showing no ECG or echocardiographic evidence for 

ischemia.  As mentioned previously patient will follow-up with cardiology in the

outpatient setting.





Currently no reports of chest pain, shortness of breath, or palpitations.  

Patient is afebrile.  No reports of nausea or vomiting and patient is tolerating

diet.  Patient will be discharged to home today and will follow-up with 

cardiology in the outpatient setting.  She will continue on Lipitor, Imdur, El

iquis, metoprolol, and low-dose aspirin.





On exam vital signs are stable.  Temp is 97.7F, pulse is 65, respirations are 

16, blood pressure is 105/65, oxygen saturation is 96% on room air.  Cardio S1, 

S2 are muffled.  Respiratory system shows diminished breath sounds at the bases 

with no wheezing or rhonchi noted.  Abdomen is soft and obese, and nontender.  

Nervous system shows no focal deficits.





Please refer to medication reconciliation sheet for a list of medications.


Patient Condition at Discharge: Fair





Plan - Discharge Summary


Discharge Rx Participant: Yes


New Discharge Prescriptions: 


New


   Aspirin 81 mg PO DAILY #30 chew


   Nicotine 14Mg/24Hr Patch [Habitrol] 1 patch TRANSDERM DAILY #30 patch


   Isosorbide Mononitrate ER [Imdur] 30 mg PO DAILY #30 tab


   Atorvastatin [Lipitor] 20 mg PO DAILY #30 tab


   Nitroglycerin Sl Tabs [Nitrostat] 0.4 mg SUBLINGUAL Q5M PRN #20 tab


     PRN Reason: Chest Pain





Continue


   Apixaban [Eliquis] 5 mg PO BID


   Metoprolol Tartrate 25 mg PO BID


Discharge Medication List





Apixaban [Eliquis] 5 mg PO BID 10/30/18 [History]


Metoprolol Tartrate 25 mg PO BID 02/18/19 [History]


Aspirin 81 mg PO DAILY #30 chew 08/03/20 [Rx]


Atorvastatin [Lipitor] 20 mg PO DAILY #30 tab 08/03/20 [Rx]


Isosorbide Mononitrate ER [Imdur] 30 mg PO DAILY #30 tab 08/03/20 [Rx]


Nicotine 14Mg/24Hr Patch [Habitrol] 1 patch TRANSDERM DAILY #30 patch 08/03/20 

[Rx]


Nitroglycerin Sl Tabs [Nitrostat] 0.4 mg SUBLINGUAL Q5M PRN #20 tab 08/03/20 

[Rx]








Follow up Appointment(s)/Referral(s): 


Eloise Jc MD [STAFF PHYSICIAN] - 08/20/20 8:45 am


Katy Tay MD [Primary Care Provider] - 08/06/20 3:00 pm


Ambulatory/Diagnostic Orders: 


Complete Blood Count w/diff [LAB.AMB] Location: None Selected


Patient Instructions/Handouts:  Chest Pain (GEN), Heart Healthy Diet (GEN)

## 2020-08-04 NOTE — ECHOS
Stress Test Results/Findings: 

Exam Performed:  dobutamine stress echo with con

Exam Date: 08/03/20

Reason for Exam: CHEST PAIN



Height: 5 ft 3 in

Weight: 131.995 kg

Protocol: DSE

Stage: 5

Duration of Exercise: 14:30



Resting Heart Rate: 72

Resting Blood Pressure: 190/71

Maximum Achieved Heart Rate: 146

Maximum Achieved Blood Pressure: 190/71

85% PMHR: 154

100% PMHR: 181

METS: NA

Technologist Comment: 



Stress Test Results/Findings: 

Baseline heart rate 72 beats a minute, Baseline blood pressure 190/71 mmHg line 
baseline 12-lead ECG shows sinus rhythm with normal ST segments

Patient received dobutamine infusion per protocol

No ST segment abnormalities

No arrhythmias

Peak heart rate 146 beats a minute.  Her pressure 136/65 mmHg

The baseline 2-D echo images were suboptimal and Definity contrast was used

There was stepwise increment in overall LV contractility without development of 
any wall motion abnormalities

@Recovery regional global LV systolic function remained normal



Impression

No ECG or echocardiographic evidence for ischemia

MTDD

## 2020-10-22 ENCOUNTER — HOSPITAL ENCOUNTER (OUTPATIENT)
Dept: HOSPITAL 47 - ORWHC2ENDO | Age: 40
Discharge: HOME | End: 2020-10-22
Attending: SURGERY
Payer: COMMERCIAL

## 2020-10-22 VITALS — DIASTOLIC BLOOD PRESSURE: 94 MMHG | SYSTOLIC BLOOD PRESSURE: 141 MMHG

## 2020-10-22 VITALS — BODY MASS INDEX: 51.3 KG/M2

## 2020-10-22 VITALS — RESPIRATION RATE: 16 BRPM | TEMPERATURE: 97.9 F

## 2020-10-22 VITALS — HEART RATE: 90 BPM

## 2020-10-22 DIAGNOSIS — Z88.8: ICD-10-CM

## 2020-10-22 DIAGNOSIS — Z86.718: ICD-10-CM

## 2020-10-22 DIAGNOSIS — Z80.49: ICD-10-CM

## 2020-10-22 DIAGNOSIS — Z86.711: ICD-10-CM

## 2020-10-22 DIAGNOSIS — Z79.899: ICD-10-CM

## 2020-10-22 DIAGNOSIS — Z88.0: ICD-10-CM

## 2020-10-22 DIAGNOSIS — K22.8: ICD-10-CM

## 2020-10-22 DIAGNOSIS — Z98.890: ICD-10-CM

## 2020-10-22 DIAGNOSIS — K29.50: ICD-10-CM

## 2020-10-22 DIAGNOSIS — G47.33: ICD-10-CM

## 2020-10-22 DIAGNOSIS — Z86.14: ICD-10-CM

## 2020-10-22 DIAGNOSIS — Z79.01: ICD-10-CM

## 2020-10-22 DIAGNOSIS — Z84.1: ICD-10-CM

## 2020-10-22 DIAGNOSIS — Z86.69: ICD-10-CM

## 2020-10-22 DIAGNOSIS — Z82.49: ICD-10-CM

## 2020-10-22 DIAGNOSIS — Z79.82: ICD-10-CM

## 2020-10-22 DIAGNOSIS — F17.210: ICD-10-CM

## 2020-10-22 DIAGNOSIS — Z91.040: ICD-10-CM

## 2020-10-22 DIAGNOSIS — Z83.3: ICD-10-CM

## 2020-10-22 DIAGNOSIS — I25.10: ICD-10-CM

## 2020-10-22 DIAGNOSIS — E66.01: ICD-10-CM

## 2020-10-22 DIAGNOSIS — F90.9: ICD-10-CM

## 2020-10-22 DIAGNOSIS — Z90.49: ICD-10-CM

## 2020-10-22 DIAGNOSIS — E78.5: ICD-10-CM

## 2020-10-22 DIAGNOSIS — Z88.1: ICD-10-CM

## 2020-10-22 DIAGNOSIS — Z91.09: ICD-10-CM

## 2020-10-22 DIAGNOSIS — Z81.8: ICD-10-CM

## 2020-10-22 DIAGNOSIS — K21.00: Primary | ICD-10-CM

## 2020-10-22 DIAGNOSIS — Z87.59: ICD-10-CM

## 2020-10-22 DIAGNOSIS — Z95.1: ICD-10-CM

## 2020-10-22 DIAGNOSIS — I25.2: ICD-10-CM

## 2020-10-22 DIAGNOSIS — Z91.89: ICD-10-CM

## 2020-10-22 DIAGNOSIS — Z88.5: ICD-10-CM

## 2020-10-22 DIAGNOSIS — K08.89: ICD-10-CM

## 2020-10-22 DIAGNOSIS — Z82.3: ICD-10-CM

## 2020-10-22 DIAGNOSIS — I10: ICD-10-CM

## 2020-10-22 PROCEDURE — 81025 URINE PREGNANCY TEST: CPT

## 2020-10-22 PROCEDURE — 43239 EGD BIOPSY SINGLE/MULTIPLE: CPT

## 2020-10-22 PROCEDURE — 88305 TISSUE EXAM BY PATHOLOGIST: CPT

## 2020-10-22 RX ADMIN — POTASSIUM CHLORIDE SCH MLS: 14.9 INJECTION, SOLUTION INTRAVENOUS at 11:35

## 2020-10-22 RX ADMIN — POTASSIUM CHLORIDE SCH MLS: 14.9 INJECTION, SOLUTION INTRAVENOUS at 12:04

## 2020-10-22 NOTE — P.OP
Date of Procedure: 10/22/20


Preoperative Diagnosis: 


GERD





Morbid obesity


Postoperative Diagnosis: 


Antral gastritis





No evidence of hiatal hernia





Mild esophagitis





Morbid obesity, BMI 52


Procedure(s) Performed: 


EGD


Anesthesia: MAC


Surgeon: Alex Schroeder


Pathology: other (Antrum, esophagus)


Condition: stable


Disposition: PACU


Description of Procedure: 


The patient's placed on the endoscopy table in the lateral position.  She 

received IV sedation.  The gastroscope placed oropharynx passed in the esophagus

and stomach.  Scope was placed through the pylorus.  First and second portion of

the duodenum appeared normal.  Scope summer back the antrum and this was mildly 

inflamed.  A biopsies performed.  Scope was then retroflexed and the remainder 

of the stomach appeared normal.  The GE junction was at 40 cm.  There is no 

evidence of a hiatal hernia.  The distal esophagus appeared minimally inflamed a

biopsies performed.  The proximal esophagus appeared normal.  Scope was 

withdrawn for patient.

## 2020-10-22 NOTE — P.GSHP
History of Present Illness


H&P Date: 10/22/20


Chief Complaint: GERD, morbid obesity





This a 40-year-old female who presents today for EGD.  She's had issues with 

GERD.  She is morbidly obese.  Her BMI is 52.





Past Medical History


Past Medical History: Coronary Artery Disease (CAD), Chest Pain / Angina, Deep 

Vein Thrombosis (DVT), GERD/Reflux, Hyperlipidemia, Myocardial Infarction (MI), 

Pulmonary Embolus (PE)


Additional Past Medical History / Comment(s): CABG,


Last Myocardial Infarction Date:: 2018


History of Any Multi-Drug Resistant Organisms: MRSA


Date of last positivie culture/infection: 


MDRO Source:: abdomen


Past Surgical History:  Section, Cholecystectomy, Coronary Bypass/CABG, 

Heart Catheterization


Additional Past Surgical History / Comment(s): Right wrist surgery, carpal 

tunnel, MRSA S/P wound, left knee surgery, had a miscarrage.


Past Anesthesia/Blood Transfusion Reactions: Postoperative Nausea & Vomiting 

(PONV)


Past Psychological History: ADD/ADHD


Smoking Status: Current every day smoker


Past Alcohol Use History: Rare


Additional Past Alcohol Use History / Comment(s): STARTED SMOKING AT AGE 13, 

SMOKES 1PPD.


Past Drug Use History: None Reported





- Past Family History


  ** Mother


Family Medical History: Cancer, CVA/TIA, Diabetes Mellitus, Myocardial 

Infarction (MI), Renal Disease


Additional Family Medical History / Comment(s): Uterine cancer, artificial 

valves, and kidney disease, .





  ** Father


Additional Family Medical History / Comment(s): PAD





  ** Brother(s)


Family Medical History: Coronary Artery Disease (CAD), Diabetes Mellitus


Additional Family Medical History / Comment(s): 2 HEART STENTS, issue with heart

valve





  ** Sister(s)


Additional Family Medical History / Comment(s): psych issues





  ** Daughter(s)


Family Medical History: No Reported History





  ** Son(s)


Family Medical History: No Reported History





Medications and Allergies


                                Home Medications











 Medication  Instructions  Recorded  Confirmed  Type


 


Apixaban [Eliquis] 5 mg PO BID 10/30/18 10/22/20 History


 


Metoprolol Tartrate 25 mg PO BID 02/18/19 10/22/20 History


 


Aspirin 81 mg PO DAILY #30 chew 08/03/20 10/22/20 Rx


 


Nicotine 14Mg/24Hr Patch [Habitrol] 1 patch TRANSDERM DAILY #30 patch 08/03/20 

10/22/20 Rx


 


Nitroglycerin Sl Tabs [Nitrostat] 0.4 mg SUBLINGUAL Q5M PRN #20 tab 08/03/20 

10/22/20 Rx


 


Atorvastatin [Lipitor] 20 mg PO HS 09/08/20 10/22/20 History


 


Enoxaparin [Lovenox] 120 mg SQ BID 09/08/20 10/22/20 History


 


Isosorbide Mononitrate ER [Imdur] 30 mg PO QAM 10/21/20 10/22/20 History








                                    Allergies











Allergy/AdvReac Type Severity Reaction Status Date / Time


 


adhesive Allergy  Rash/Hives Verified 10/21/20 10:17


 


albuterol Allergy  Anaphylaxis Verified 10/21/20 10:17


 


amoxicillin Allergy  Anaphylaxis Verified 10/21/20 10:17


 


ampicillin Allergy  Anaphylaxis Verified 10/21/20 10:17


 


azithromycin [From Zithromax] Allergy  Anaphylaxis Verified 10/21/20 10:17


 


erythromycin base Allergy  Anaphylaxis Verified 10/21/20 10:17


 


latex Allergy  Rash/Hives Verified 10/21/20 10:17


 


penicillin V Allergy  Anaphylaxis Verified 10/21/20 10:17


 


morphine AdvReac  Hallucinati Verified 10/21/20 10:17





   ons  














Surgical - Exam


                                   Vital Signs











Temp Pulse Resp BP Pulse Ox


 


 97.9 F   88   16   125/66   97 


 


 10/22/20 11:27  10/22/20 11:27  10/22/20 11:27  10/22/20 11:27  10/22/20 11:27














- General


well developed, well nourished, no distress





- Eyes


PERRL





- ENT


normal pinna





- Neck


no masses





- Respiratory


normal expansion





- Cardiovascular


Rhythm: regular





- Abdomen


Abdomen: soft, non tender





Assessment and Plan


Assessment: 





GERD





Morbid obesity





We'll perform EGD.

## 2020-10-26 ENCOUNTER — HOSPITAL ENCOUNTER (OUTPATIENT)
Dept: HOSPITAL 47 - BARWHC3 | Age: 40
Discharge: HOME | End: 2020-10-26
Attending: SURGERY
Payer: COMMERCIAL

## 2020-10-26 VITALS — HEART RATE: 101 BPM | DIASTOLIC BLOOD PRESSURE: 85 MMHG | TEMPERATURE: 98.1 F | SYSTOLIC BLOOD PRESSURE: 129 MMHG

## 2020-10-26 VITALS — BODY MASS INDEX: 51 KG/M2

## 2020-10-26 DIAGNOSIS — E55.9: ICD-10-CM

## 2020-10-26 DIAGNOSIS — F17.200: ICD-10-CM

## 2020-10-26 DIAGNOSIS — E88.81: ICD-10-CM

## 2020-10-26 DIAGNOSIS — E66.01: Primary | ICD-10-CM

## 2020-10-26 DIAGNOSIS — Z90.49: ICD-10-CM

## 2020-10-26 LAB
ERYTHROCYTE [DISTWIDTH] IN BLOOD BY AUTOMATED COUNT: 4.51 M/UL (ref 3.8–5.4)
ERYTHROCYTE [DISTWIDTH] IN BLOOD: 13.1 % (ref 11.5–15.5)
HCT VFR BLD AUTO: 41.5 % (ref 34–46)
HGB BLD-MCNC: 13.6 GM/DL (ref 11.4–16)
MCH RBC QN AUTO: 30.2 PG (ref 25–35)
MCHC RBC AUTO-ENTMCNC: 32.8 G/DL (ref 31–37)
MCV RBC AUTO: 92 FL (ref 80–100)
PLATELET # BLD AUTO: 245 K/UL (ref 150–450)
WBC # BLD AUTO: 6.4 K/UL (ref 3.8–10.6)

## 2020-10-26 PROCEDURE — 93005 ELECTROCARDIOGRAM TRACING: CPT

## 2020-10-26 PROCEDURE — 99211 OFF/OP EST MAY X REQ PHY/QHP: CPT

## 2020-10-26 PROCEDURE — 82607 VITAMIN B-12: CPT

## 2020-10-26 PROCEDURE — 84425 ASSAY OF VITAMIN B-1: CPT

## 2020-10-26 PROCEDURE — 82306 VITAMIN D 25 HYDROXY: CPT

## 2020-10-26 PROCEDURE — 85027 COMPLETE CBC AUTOMATED: CPT

## 2020-10-26 PROCEDURE — 83036 HEMOGLOBIN GLYCOSYLATED A1C: CPT

## 2020-10-26 PROCEDURE — 80053 COMPREHEN METABOLIC PANEL: CPT

## 2020-10-26 PROCEDURE — 82746 ASSAY OF FOLIC ACID SERUM: CPT

## 2020-10-26 NOTE — P.HPBAR
Bariatric H&P





- History & Physicial


H&P Date: 10/26/20


History & Physicial: 


Visit/CC: initial visit





Patient initial contact: 





Initial weight: 


Initial weight in pounds: 


Height: 5 ft 3 in


Initial BMI: 





Last weight: 





Current weight: 130.635 kg


Current weight in pounds: 288.00


Current BMI: 51.0





Ideal body weight (based on NIH guidelines): 52.163 kg





Excess body weight loss: 








The patient is a 40 year-old F who presents for Bariatric Assessment.  Patient 

presents today for initial batch assessment.  Her BMI 51.  She is requesting 

sleeve gastrectomy.














Past Medical History


Past Medical History: Coronary Artery Disease (CAD), Chest Pain / Angina, Deep 

Vein Thrombosis (DVT), GERD/Reflux, Hyperlipidemia, Myocardial Infarction (MI), 

Pulmonary Embolus (PE)


Additional Past Medical History / Comment(s): CABG,


Last Myocardial Infarction Date:: 2018


History of Any Multi-Drug Resistant Organisms: MRSA


Year Discovered:: 


MDRO Source:: abdomen


Past Surgical History:  Section, Cholecystectomy, Coronary Bypass/CABG, 

Heart Catheterization


Additional Past Surgical History / Comment(s): Right wrist surgery, carpal 

tunnel, MRSA S/P wound, left knee surgery, had a miscarrage.


Past Anesthesia/Blood Transfusion Reactions: Postoperative Nausea & Vomiting 

(PONV)


Past Psychological History: ADD/ADHD


Smoking Status: Current every day smoker


Past Alcohol Use History: Rare


Additional Past Alcohol Use History / Comment(s): STARTED SMOKING AT AGE 13, 

SMOKES 1PPD.


Past Drug Use History: None Reported





- Past Family History


  ** Mother


Family Medical History: Cancer, CVA/TIA, Diabetes Mellitus, Myocardial 

Infarction (MI), Renal Disease


Additional Family Medical History / Comment(s): Uterine cancer, artificial 

valves, and kidney disease, .





  ** Father


Additional Family Medical History / Comment(s): PAD





  ** Brother(s)


Family Medical History: Coronary Artery Disease (CAD), Diabetes Mellitus


Additional Family Medical History / Comment(s): 2 HEART STENTS, issue with heart

valve





  ** Sister(s)


Additional Family Medical History / Comment(s): psych issues





  ** Daughter(s)


Family Medical History: No Reported History





  ** Son(s)


Family Medical History: No Reported History





Surgical - Exam


                                   Vital Signs











Temp Pulse BP


 


 98.1 F   101 H  129/85 


 


 10/26/20 14:30  10/26/20 14:30  10/26/20 14:30














- General


well developed, well nourished, no distress





- Eyes


PERRL





- ENT


normal pinna





- Neck


no masses





- Respiratory


normal expansion





- Cardiovascular


Rhythm: regular





- Abdomen


Abdomen: soft, non tender





Bariatric Assessment & Plan


Plan: 





Morbid obesity, BMI 51.  Patient will be scheduled for sleeve gastrectomy once 

her insurance authorization requirements have been met.  She's undergone recent 

EGD.  The patient will need cardiac clearance due to her previous heart history.

 She'll follow-up in one month.





Bariatric Checklist


Checklist: 


Plan: 





Checklist: 





EGD: 


1. Hiatal hernia: 


2. H. Pylori: 





HgbA1c: 





Vitamin D: 





Smoking: Current every day smoker





Primary care physician referral: Dr. Tay





Psychiatry clearance: 





Cardiology clearance: 





Sleep study: 





Diet journal: 





VTE risk score: 





VTE risk level: 





Rehab needs at discharge:

## 2020-10-27 LAB
ALBUMIN SERPL-MCNC: 4.3 G/DL (ref 3.8–4.9)
ALBUMIN/GLOB SERPL: 2.15 G/DL (ref 1.6–3.17)
ALP SERPL-CCNC: 82 U/L (ref 41–126)
ALT SERPL-CCNC: 43 U/L (ref 8–44)
ANION GAP SERPL CALC-SCNC: 8 MMOL/L (ref 4–12)
AST SERPL-CCNC: 22 U/L (ref 13–35)
BUN SERPL-SCNC: 12 MG/DL (ref 9–27)
BUN/CREAT SERPL: 15 RATIO (ref 12–20)
CALCIUM SPEC-MCNC: 9.2 MG/DL (ref 8.7–10.3)
CHLORIDE SERPL-SCNC: 106 MMOL/L (ref 96–109)
CO2 SERPL-SCNC: 28 MMOL/L (ref 21.6–31.8)
GLOBULIN SER CALC-MCNC: 2 G/DL (ref 1.6–3.3)
GLUCOSE SERPL-MCNC: 97 MG/DL (ref 70–110)
HBA1C MFR BLD: 5.5 % (ref 4–6)
POTASSIUM SERPL-SCNC: 4.3 MMOL/L (ref 3.5–5.5)
PROT SERPL-MCNC: 6.3 G/DL (ref 6.2–8.2)
SODIUM SERPL-SCNC: 142 MMOL/L (ref 135–145)
VIT B12 SERPL-MCNC: 295 PG/ML (ref 200–944)

## 2020-11-11 ENCOUNTER — HOSPITAL ENCOUNTER (EMERGENCY)
Dept: HOSPITAL 47 - EC | Age: 40
Discharge: HOME | End: 2020-11-11
Payer: COMMERCIAL

## 2020-11-11 VITALS — RESPIRATION RATE: 21 BRPM | DIASTOLIC BLOOD PRESSURE: 75 MMHG | SYSTOLIC BLOOD PRESSURE: 121 MMHG | HEART RATE: 84 BPM

## 2020-11-11 VITALS — TEMPERATURE: 97.8 F

## 2020-11-11 DIAGNOSIS — Z88.1: ICD-10-CM

## 2020-11-11 DIAGNOSIS — R07.9: Primary | ICD-10-CM

## 2020-11-11 DIAGNOSIS — Z95.1: ICD-10-CM

## 2020-11-11 DIAGNOSIS — Z79.899: ICD-10-CM

## 2020-11-11 DIAGNOSIS — Z88.0: ICD-10-CM

## 2020-11-11 DIAGNOSIS — Z86.14: ICD-10-CM

## 2020-11-11 DIAGNOSIS — Z88.5: ICD-10-CM

## 2020-11-11 DIAGNOSIS — R06.00: ICD-10-CM

## 2020-11-11 DIAGNOSIS — I25.2: ICD-10-CM

## 2020-11-11 DIAGNOSIS — F17.200: ICD-10-CM

## 2020-11-11 DIAGNOSIS — Z86.19: ICD-10-CM

## 2020-11-11 DIAGNOSIS — E78.5: ICD-10-CM

## 2020-11-11 DIAGNOSIS — Z86.711: ICD-10-CM

## 2020-11-11 DIAGNOSIS — Z86.718: ICD-10-CM

## 2020-11-11 DIAGNOSIS — Z91.040: ICD-10-CM

## 2020-11-11 DIAGNOSIS — Z91.048: ICD-10-CM

## 2020-11-11 DIAGNOSIS — Z79.01: ICD-10-CM

## 2020-11-11 DIAGNOSIS — Z82.49: ICD-10-CM

## 2020-11-11 LAB
ALBUMIN SERPL-MCNC: 4.1 G/DL (ref 3.5–5)
ALP SERPL-CCNC: 74 U/L (ref 38–126)
ALT SERPL-CCNC: 19 U/L (ref 4–34)
ANION GAP SERPL CALC-SCNC: 7 MMOL/L
APTT BLD: 22.6 SEC (ref 22–30)
AST SERPL-CCNC: 23 U/L (ref 14–36)
BASOPHILS # BLD AUTO: 0.1 K/UL (ref 0–0.2)
BASOPHILS NFR BLD AUTO: 1 %
BUN SERPL-SCNC: 12 MG/DL (ref 7–17)
CALCIUM SPEC-MCNC: 9.4 MG/DL (ref 8.4–10.2)
CHLORIDE SERPL-SCNC: 104 MMOL/L (ref 98–107)
CO2 SERPL-SCNC: 27 MMOL/L (ref 22–30)
D DIMER PPP FEU-MCNC: 0.42 MG/L FEU (ref ?–0.6)
EOSINOPHIL # BLD AUTO: 0.2 K/UL (ref 0–0.7)
EOSINOPHIL NFR BLD AUTO: 2 %
ERYTHROCYTE [DISTWIDTH] IN BLOOD BY AUTOMATED COUNT: 4.76 M/UL (ref 3.8–5.4)
ERYTHROCYTE [DISTWIDTH] IN BLOOD: 13.3 % (ref 11.5–15.5)
GLUCOSE SERPL-MCNC: 103 MG/DL (ref 74–99)
HCT VFR BLD AUTO: 42.9 % (ref 34–46)
HGB BLD-MCNC: 14.1 GM/DL (ref 11.4–16)
INR PPP: 0.9 (ref ?–1.2)
LIPASE SERPL-CCNC: 154 U/L (ref 23–300)
LYMPHOCYTES # SPEC AUTO: 2.9 K/UL (ref 1–4.8)
LYMPHOCYTES NFR SPEC AUTO: 28 %
MAGNESIUM SPEC-SCNC: 1.9 MG/DL (ref 1.6–2.3)
MCH RBC QN AUTO: 29.7 PG (ref 25–35)
MCHC RBC AUTO-ENTMCNC: 32.9 G/DL (ref 31–37)
MCV RBC AUTO: 90.3 FL (ref 80–100)
MONOCYTES # BLD AUTO: 0.4 K/UL (ref 0–1)
MONOCYTES NFR BLD AUTO: 4 %
NEUTROPHILS # BLD AUTO: 6.7 K/UL (ref 1.3–7.7)
NEUTROPHILS NFR BLD AUTO: 64 %
PLATELET # BLD AUTO: 261 K/UL (ref 150–450)
POTASSIUM SERPL-SCNC: 3.9 MMOL/L (ref 3.5–5.1)
PROT SERPL-MCNC: 7 G/DL (ref 6.3–8.2)
PT BLD: 9.4 SEC (ref 9–12)
SODIUM SERPL-SCNC: 138 MMOL/L (ref 137–145)
WBC # BLD AUTO: 10.5 K/UL (ref 3.8–10.6)

## 2020-11-11 PROCEDURE — 83690 ASSAY OF LIPASE: CPT

## 2020-11-11 PROCEDURE — 71046 X-RAY EXAM CHEST 2 VIEWS: CPT

## 2020-11-11 PROCEDURE — 71275 CT ANGIOGRAPHY CHEST: CPT

## 2020-11-11 PROCEDURE — 84484 ASSAY OF TROPONIN QUANT: CPT

## 2020-11-11 PROCEDURE — 36415 COLL VENOUS BLD VENIPUNCTURE: CPT

## 2020-11-11 PROCEDURE — 83735 ASSAY OF MAGNESIUM: CPT

## 2020-11-11 PROCEDURE — 80053 COMPREHEN METABOLIC PANEL: CPT

## 2020-11-11 PROCEDURE — 99285 EMERGENCY DEPT VISIT HI MDM: CPT

## 2020-11-11 PROCEDURE — 93005 ELECTROCARDIOGRAM TRACING: CPT

## 2020-11-11 PROCEDURE — 85379 FIBRIN DEGRADATION QUANT: CPT

## 2020-11-11 PROCEDURE — 83880 ASSAY OF NATRIURETIC PEPTIDE: CPT

## 2020-11-11 PROCEDURE — 85610 PROTHROMBIN TIME: CPT

## 2020-11-11 PROCEDURE — 85025 COMPLETE CBC W/AUTO DIFF WBC: CPT

## 2020-11-11 PROCEDURE — 74174 CTA ABD&PLVS W/CONTRAST: CPT

## 2020-11-11 PROCEDURE — 85730 THROMBOPLASTIN TIME PARTIAL: CPT

## 2020-11-11 NOTE — XR
EXAMINATION TYPE: XR chest 2V

 

DATE OF EXAM: 11/11/2020

 

COMPARISON: 8/1/2020

 

HISTORY: Chest pain

 

TECHNIQUE:

 

FINDINGS: Heart is normal. Lungs are clear. There is no heart failure. There are sternal wires. Bony 
thorax is intact. There are chest leads.

 

IMPRESSION: No active cardiopulmonary disease. Normal heart. No change.

## 2020-11-11 NOTE — CT
EXAMINATION TYPE: CT angio thor/abd pel aorta

 

DATE OF EXAM: 11/11/2020

 

COMPARISON: 8/1/2020

 

HISTORY: Chest pain

 

CT DLP: 3382.8 mGycm

Automated exposure control for dose reduction was used.

 

CONTRAST: 

Performed without and with IV Contrast, patient injected with 100 mL of Isovue 370.

 

Images were obtained from the thoracic inlet to the floor the pelvis without and with IV contrast. Th
ere are 3-D post processed images.

The lungs are clear of infiltrate. There is no evidence of a pulmonary mass. There is no evidence of 
renal calculus. There are clips from cholecystectomy. Heart is normal. There are no hilar masses. Tho
racic aorta is intact. There is no aneurysm or dissection. There is no mediastinal adenopathy.

 

Liver spleen pancreas stomach appear normal. There are clips from cholecystectomy. There is no adrena
l mass. Kidneys show satisfactory contrast opacification. There is no hydronephrosis. There is small 
umbilical hernia that contains fat. Appendix appears normal. Bladder distends smoothly. There is no i
nguinal hernia. There is no free fluid in the pelvis. Uterus is anteverted. There is no pelvic mass. 
There is no mesenteric edema. There is no ascites or free air.

 

There is normal contrast opacification of the celiac artery and superior mesenteric artery and both r
enal arteries. There is arterial flow in the iliac and femoral arteries. There is no evidence of aneu
rysm or dissection. There is no evidence of hemodynamic stenosis.

 

IMPRESSION:

Negative exam. No angiographic abnormality. No adverse change compared to old exam.

FINDINGS:

## 2020-11-11 NOTE — ED
Chest Pain HPI





- General


Chief Complaint: Chest Pain


Stated Complaint: chest pain


Time Seen by Provider: 20 19:56


Source: patient


Mode of arrival: ambulatory


Limitations: no limitations





- History of Present Illness


Initial Comments: 





40-year-old female with history of CABG in 2018, CAD presented to the emergency 

department with a chief complaint of chest pain.  Patient reports that she was 

diagnosed with Covid exactly 10 days ago and today was her last day of self-

isolation.  However, she developed midsternal chest pain is radiating to her 

back for the past 4-5 hours and is associated with dyspnea on exertion.  She 

reports that pain is reproducible to palpation, however she has had midsternal 

tenderness ever since her CABG.  She denies any lightheaded dizziness or 

diaphoretic episodes.  Denies any headaches, one-sided weakness or paresthesias.

 She reports taking 2 nitro tablets with no improvement in symptoms.  She does 

have intermittent chest pain at baseline but never last this long.





- Related Data


                                Home Medications











 Medication  Instructions  Recorded  Confirmed


 


Apixaban [Eliquis] 5 mg PO BID 10/30/18 11/11/20


 


Metoprolol Tartrate 25 mg PO BID 19


 


Isosorbide Mononitrate ER [Imdur] 30 mg PO DAILY 10/21/20 11/11/20


 


Atorvastatin Calcium [Lipitor] 20 mg PO HS 20








                                  Previous Rx's











 Medication  Instructions  Recorded


 


Nitroglycerin Sl Tabs [Nitrostat] 0.4 mg SUBLINGUAL Q5M PRN #20 tab 20











                                    Allergies











Allergy/AdvReac Type Severity Reaction Status Date / Time


 


adhesive Allergy  Rash/Hives Verified 20 21:28


 


albuterol Allergy  Anaphylaxis Verified 20 21:28


 


amoxicillin Allergy  Anaphylaxis Verified 20 21:28


 


ampicillin Allergy  Anaphylaxis Verified 20 21:28


 


azithromycin [From Zithromax] Allergy  Anaphylaxis Verified 20 21:28


 


erythromycin base Allergy  Anaphylaxis Verified 20 21:28


 


latex Allergy  Rash/Hives Verified 20 21:28


 


penicillin V Allergy  Anaphylaxis Verified 20 21:28


 


morphine AdvReac  Hallucinati Verified 20 21:28





   ons  














Review of Systems


ROS Statement: 


Those systems with pertinent positive or pertinent negative responses have been 

documented in the HPI.





ROS Other: All systems not noted in ROS Statement are negative.





Past Medical History


Past Medical History: Coronary Artery Disease (CAD), Chest Pain / Angina, Deep 

Vein Thrombosis (DVT), GERD/Reflux, Hyperlipidemia, Myocardial Infarction (MI), 

Pulmonary Embolus (PE)


Additional Past Medical History / Comment(s): CABG, Covid 19,


Last Myocardial Infarction Date:: 2018


History of Any Multi-Drug Resistant Organisms: MRSA


Date of last positivie culture/infection: 


MDRO Source:: abdomen


Past Surgical History:  Section, Cholecystectomy, Coronary Bypass/CABG, 

Heart Catheterization


Additional Past Surgical History / Comment(s): Right wrist surgery, carpal 

tunnel, MRSA S/P wound, left knee surgery, had a miscarrage, CABG


Past Anesthesia/Blood Transfusion Reactions: Postoperative Nausea & Vomiting 

(PONV)


Past Psychological History: ADD/ADHD


Smoking Status: Current every day smoker


Past Alcohol Use History: Rare


Past Drug Use History: Marijuana





- Past Family History


  ** Mother


Family Medical History: Cancer, CVA/TIA, Diabetes Mellitus, Myocardial 

Infarction (MI), Renal Disease


Additional Family Medical History / Comment(s): Uterine cancer, artificial v

meraz, and kidney disease, .





  ** Father


Additional Family Medical History / Comment(s): PAD





  ** Brother(s)


Family Medical History: Coronary Artery Disease (CAD), Diabetes Mellitus


Additional Family Medical History / Comment(s): 2 HEART STENTS, issue with heart

valve





  ** Sister(s)


Additional Family Medical History / Comment(s): psych issues





  ** Daughter(s)


Family Medical History: No Reported History





  ** Son(s)


Family Medical History: No Reported History





General Exam


Limitations: no limitations


General appearance: alert, in no apparent distress, obese


Head exam: Present: atraumatic, normocephalic, normal inspection


Eye exam: Present: normal appearance, PERRL, EOMI


Pupils: Present: normal accommodation


ENT exam: Present: normal exam, normal oropharynx, mucous membranes moist, TM's 

normal bilaterally, normal external ear exam


Neck exam: Present: normal inspection, full ROM.  Absent: tenderness


Respiratory exam: Present: normal lung sounds bilaterally.  Absent: respiratory 

distress, wheezes, rales, rhonchi, stridor


Cardiovascular Exam: Present: regular rate, normal rhythm, normal heart sounds. 

Absent: systolic murmur, diastolic murmur


GI/Abdominal exam: Present: soft.  Absent: distended, tenderness, guarding, 

rebound


Extremities exam: Present: normal inspection, full ROM, normal capillary refill,

other (+2 ulnar and radial pulses bilateral.).  Absent: tenderness, pedal edema,

joint swelling, calf tenderness


Back exam: Present: normal inspection, full ROM.  Absent: tenderness, CVA 

tenderness (R), CVA tenderness (L)


Neurological exam: Present: alert, oriented X3, normal gait


Psychiatric exam: Present: normal affect, normal mood


Skin exam: Present: warm, dry, intact, normal color





Course


                                   Vital Signs











  20





  19:39 20:04 20:06


 


Temperature 97.8 F  


 


Pulse Rate 89 92 


 


Pulse Rate [   90





Cardiac Monitor   





]   


 


Respiratory 20 21 22





Rate   


 


Blood Pressure 134/93  


 


O2 Sat by Pulse 98 97 





Oximetry   














  20





  20:30 21:00 21:30


 


Temperature   


 


Pulse Rate 86 84 84


 


Pulse Rate [   





Cardiac Monitor   





]   


 


Respiratory 21 21 21





Rate   


 


Blood Pressure 102/53 122/79 121/75


 


O2 Sat by Pulse 98 97 98





Oximetry   














- Reevaluation(s)


Reevaluation #1: 





20 23:06


Medical record reviewed


20 23:06








Chest Pain MDM





- Differential Diagnosis


ACS, Pneumonia, Pleurisy-Other, Chest Wall Syndrome





- MDM





40-year-old female presenting to the emergency department with a chief complaint

of chest pain.  Physical examination reveals mild testable patient in them 

midsternal region.  She does have history of CABG.  CBC CMP unremarkable.  Coags

within normal limits.  Initial troponin is negative.  D-dimer negative.  

Echocardiogram and stress test 2 months ago were unremarkable.  CT angiogram of 

the chest and abdomen performed to rule out a dissection which revealed benign 

findings.  Patient was offered admission, she declined.  States she would like 

to go home and will follow-up this week with her cardiologist.  Return 

parameters to discussed the patient is a attending ago.  Case discussed with 

physician.





Disposition


Clinical Impression: 


 Chest pain





Disposition: HOME SELF-CARE


Condition: Stable


Instructions (If sedation given, give patient instructions):  Chest Pain (ED)


Additional Instructions: 


Follow with the cardiologist.  Return to emergency department if symptoms 

worsen.


Is patient prescribed a controlled substance at d/c from ED?: No


Referrals: 


Katy Tay MD [Primary Care Provider] - 1-2 days


Time of Disposition: 22:03

## 2020-12-21 ENCOUNTER — HOSPITAL ENCOUNTER (OUTPATIENT)
Dept: HOSPITAL 47 - BARWHC3 | Age: 40
Discharge: HOME | End: 2020-12-21
Attending: SURGERY
Payer: COMMERCIAL

## 2020-12-21 VITALS — BODY MASS INDEX: 51.5 KG/M2

## 2020-12-21 DIAGNOSIS — E66.01: Primary | ICD-10-CM

## 2020-12-21 DIAGNOSIS — Z71.3: ICD-10-CM

## 2020-12-21 PROCEDURE — 97804 MEDICAL NUTRITION GROUP: CPT

## 2020-12-28 ENCOUNTER — HOSPITAL ENCOUNTER (OUTPATIENT)
Dept: HOSPITAL 47 - BARWHC3 | Age: 40
Discharge: HOME | End: 2020-12-28
Attending: SURGERY
Payer: COMMERCIAL

## 2020-12-28 VITALS
HEART RATE: 100 BPM | RESPIRATION RATE: 18 BRPM | TEMPERATURE: 98.1 F | DIASTOLIC BLOOD PRESSURE: 73 MMHG | SYSTOLIC BLOOD PRESSURE: 120 MMHG

## 2020-12-28 VITALS — BODY MASS INDEX: 51 KG/M2

## 2020-12-28 DIAGNOSIS — E66.01: Primary | ICD-10-CM

## 2020-12-28 DIAGNOSIS — Z90.49: ICD-10-CM

## 2020-12-28 DIAGNOSIS — Z98.890: ICD-10-CM

## 2020-12-28 DIAGNOSIS — F17.200: ICD-10-CM

## 2020-12-28 PROCEDURE — 99211 OFF/OP EST MAY X REQ PHY/QHP: CPT

## 2020-12-28 NOTE — P.HPBAR
Bariatric H&P





- History & Physicial


H&P Date: 20


History & Physicial: 


Visit/CC: pre-surgical





Patient initial contact: 





Initial weight: 


Initial weight in pounds: 


Height: 5 ft 3 in


Initial BMI: 





Last weight: 





Current weight: 130.635 kg


Current weight in pounds: 288.00


Current BMI: 51.0





Ideal body weight (based on NIH guidelines): 52.163 kg





Excess body weight loss: 








The patient is a 40 year-old F who presents for Bariatric Assessment.  Patient 

presents today for sleeve gastrectomy consultation.  She is morbidly obese.  Her

BMI is 51.





Past Medical History


Past Medical History: Coronary Artery Disease (CAD), Chest Pain / Angina, Deep 

Vein Thrombosis (DVT), GERD/Reflux, Hyperlipidemia, Myocardial Infarction (MI), 

Pulmonary Embolus (PE)


Additional Past Medical History / Comment(s): CABG, Covid 19,


Last Myocardial Infarction Date:: 2018


History of Any Multi-Drug Resistant Organisms: MRSA


Year Discovered:: 


MDRO Source:: abdomen


Past Surgical History:  Section, Cholecystectomy, Coronary Bypass/CABG, 

Heart Catheterization


Additional Past Surgical History / Comment(s): Right wrist surgery, carpal 

tunnel, MRSA S/P wound, left knee surgery, had a miscarrage, CABG


Past Anesthesia/Blood Transfusion Reactions: Postoperative Nausea & Vomiting 

(PONV)


Past Psychological History: ADD/ADHD


Smoking Status: Current every day smoker


Past Alcohol Use History: Rare


Additional Past Alcohol Use History / Comment(s): STARTED SMOKING AT AGE 13, 

SMOKES 1PPD.


Past Drug Use History: Marijuana





- Past Family History


  ** Mother


Family Medical History: Cancer, CVA/TIA, Diabetes Mellitus, Myocardial 

Infarction (MI), Renal Disease


Additional Family Medical History / Comment(s): Uterine cancer, artificial 

valves, and kidney disease, .





  ** Father


Additional Family Medical History / Comment(s): PAD





  ** Brother(s)


Family Medical History: Coronary Artery Disease (CAD), Diabetes Mellitus


Additional Family Medical History / Comment(s): 2 HEART STENTS, issue with heart

valve





  ** Sister(s)


Additional Family Medical History / Comment(s): psych issues





  ** Daughter(s)


Family Medical History: No Reported History





  ** Son(s)


Family Medical History: No Reported History





Surgical - Exam


                                   Vital Signs











Temp Pulse Resp BP


 


 98.1 F   100   18   120/73 


 


 20 13:14  20 13:14  20 13:14  20 13:14














- General


well developed, well nourished, no distress





- Eyes


PERRL





- ENT


normal pinna





- Neck


no masses





- Respiratory


normal expansion





- Cardiovascular


Rhythm: regular





- Abdomen


Abdomen: soft, non tender





Bariatric Assessment & Plan


Plan: 





Morbid obesity, BMI 51.  Patient will be scheduled for sleeve gastrectomy when 

she meets her insurance authorization requirements.  She'll follow-up in 4 

weeks.





Bariatric Checklist


Checklist: 


Plan: 





Checklist: 





EGD: 


1. Hiatal hernia: 


2. H. Pylori: 





HgbA1c: 





Vitamin D: 





Smoking: Current every day smoker





Primary care physician referral: Dr. Tay





Psychiatry clearance: 





Cardiology clearance: 





Sleep study: 





Diet journal: 





VTE risk score: 





VTE risk level: 





Rehab needs at discharge:

## 2021-01-14 ENCOUNTER — HOSPITAL ENCOUNTER (OUTPATIENT)
Dept: HOSPITAL 47 - LABPAT | Age: 41
Discharge: HOME | End: 2021-01-14
Attending: INTERNAL MEDICINE
Payer: COMMERCIAL

## 2021-01-14 DIAGNOSIS — Z01.818: Primary | ICD-10-CM

## 2021-01-14 LAB
ANION GAP SERPL CALC-SCNC: 6 MMOL/L
BUN SERPL-SCNC: 7 MG/DL (ref 7–17)
CHLORIDE SERPL-SCNC: 105 MMOL/L (ref 98–107)
CO2 SERPL-SCNC: 27 MMOL/L (ref 22–30)
ERYTHROCYTE [DISTWIDTH] IN BLOOD BY AUTOMATED COUNT: 4.94 M/UL (ref 3.8–5.4)
ERYTHROCYTE [DISTWIDTH] IN BLOOD: 13.3 % (ref 11.5–15.5)
HCT VFR BLD AUTO: 44.7 % (ref 34–46)
HGB BLD-MCNC: 14.8 GM/DL (ref 11.4–16)
MCH RBC QN AUTO: 30 PG (ref 25–35)
MCHC RBC AUTO-ENTMCNC: 33.2 G/DL (ref 31–37)
MCV RBC AUTO: 90.5 FL (ref 80–100)
PLATELET # BLD AUTO: 296 K/UL (ref 150–450)
POTASSIUM SERPL-SCNC: 4.3 MMOL/L (ref 3.5–5.1)
SODIUM SERPL-SCNC: 138 MMOL/L (ref 137–145)
WBC # BLD AUTO: 10.2 K/UL (ref 3.8–10.6)

## 2021-01-14 PROCEDURE — 80051 ELECTROLYTE PANEL: CPT

## 2021-01-14 PROCEDURE — 84520 ASSAY OF UREA NITROGEN: CPT

## 2021-01-14 PROCEDURE — 85027 COMPLETE CBC AUTOMATED: CPT

## 2021-01-14 PROCEDURE — 82565 ASSAY OF CREATININE: CPT

## 2021-01-18 ENCOUNTER — HOSPITAL ENCOUNTER (OUTPATIENT)
Dept: HOSPITAL 47 - LABWHC1 | Age: 41
Discharge: HOME | End: 2021-01-18
Attending: INTERNAL MEDICINE
Payer: COMMERCIAL

## 2021-01-18 DIAGNOSIS — E03.9: Primary | ICD-10-CM

## 2021-01-18 PROCEDURE — 84439 ASSAY OF FREE THYROXINE: CPT

## 2021-01-18 PROCEDURE — 84443 ASSAY THYROID STIM HORMONE: CPT

## 2021-01-18 PROCEDURE — 36415 COLL VENOUS BLD VENIPUNCTURE: CPT

## 2021-01-19 LAB — T4 FREE SERPL-MCNC: 1.3 NG/DL (ref 0.8–1.8)

## 2021-01-22 ENCOUNTER — HOSPITAL ENCOUNTER (OUTPATIENT)
Dept: HOSPITAL 47 - CATHCVL | Age: 41
End: 2021-01-22
Attending: INTERNAL MEDICINE
Payer: COMMERCIAL

## 2021-01-22 VITALS — RESPIRATION RATE: 16 BRPM | TEMPERATURE: 98.4 F

## 2021-01-22 VITALS — DIASTOLIC BLOOD PRESSURE: 61 MMHG | HEART RATE: 60 BPM | SYSTOLIC BLOOD PRESSURE: 131 MMHG

## 2021-01-22 VITALS — BODY MASS INDEX: 51.3 KG/M2

## 2021-01-22 DIAGNOSIS — Z82.49: ICD-10-CM

## 2021-01-22 DIAGNOSIS — Z86.718: ICD-10-CM

## 2021-01-22 DIAGNOSIS — Z72.0: ICD-10-CM

## 2021-01-22 DIAGNOSIS — Z88.0: ICD-10-CM

## 2021-01-22 DIAGNOSIS — Z79.899: ICD-10-CM

## 2021-01-22 DIAGNOSIS — Z95.1: ICD-10-CM

## 2021-01-22 DIAGNOSIS — Z79.01: ICD-10-CM

## 2021-01-22 DIAGNOSIS — Z88.8: ICD-10-CM

## 2021-01-22 DIAGNOSIS — E66.9: ICD-10-CM

## 2021-01-22 DIAGNOSIS — Z79.82: ICD-10-CM

## 2021-01-22 DIAGNOSIS — I25.110: Primary | ICD-10-CM

## 2021-01-22 DIAGNOSIS — Z88.1: ICD-10-CM

## 2021-01-22 PROCEDURE — 93459 L HRT ART/GRFT ANGIO: CPT

## 2021-01-22 PROCEDURE — 81025 URINE PREGNANCY TEST: CPT

## 2021-01-22 NOTE — CC
CARDIAC CATHETERIZATION REPORT



DATE OF SERVICE:

01/22/2021



PROCEDURE:

Left heart catheterization, coronary angiography, left internal mammary artery

injection and left ventriculography.



PERFORMED BY:

Dr. APOLLO Jc.



Moderate conscious sedation time was 31 minutes.  Patient was administered Versed.

Oxygen saturation, hemodynamics, and EKG were monitored closely.



CLINICAL INFORMATION:

Mrs. Janis Le is a 40-year-old lady with a known history of coronary

dissection involving the LAD that was initially treated medically, but because of total

occlusion she went on to have a LIMA to LAD performed in February 2018 at the [facility].  Since then, this procedure was subsequently followed by 2

complications including a DVT and pulmonary embolism and now she takes Eliquis 5 mg

b.i.d. on a regular basis.  Because of recurrent episodes of chest pain and a negative

stress test at 80% of her heart rate, she was advised coronary angiography.  She is

also going for bariatric surgery.  I did not perform the procedure from the right

radial because her radial artery is not palpable and she had a complication following

her open-heart surgery where there was an arterial line requiring thrombectomy

probably.  Her left radial was palpable and I recommended that we will perform the

procedure from the right femoral approach.



PROCEDURE NOTE:

Under local anesthesia and strict aseptic precautions, a 6-Nigerien introducer was placed

in the right femoral artery.  Using a JL4 catheter, I performed selective coronary

angiography of the left system.  A Santa catheter was used to check the LIMA graft as

well as the native RCA.  A pigtail catheter was used to check LV pressures and LV-gram

was performed in 30-degree HERNANDEZ projection.  The sheath was then taken out and Angio-

Seal device used to secure hemostasis and patient was sent to the room in a stable

condition.



CARDIAC CATHETERIZATION FINDINGS:

The left ventricular end-diastolic pressure was 14 mmHg without any gradient across

aortic valve.



CORONARY ANGIOGRAPHY FINDINGS:

RIGHT CORONARY ARTERY: Large dominant vessel.  No significant disease.  Distally

bifurcates into PDA and PLV, supplies a sizable amount of myocardium.



LEFT MAIN CORONARY ARTERY: Short patent vessel that trifurcates into LAD, circumflex

and ramus intermedius.



LEFT ANTERIOR DESCENDING CORONARY ARTERY: This vessel is patent.  In the midportion,

there is a 60% narrowing after which there is a long area of dissection and about a 50%

narrowing, but the flow runs all the way to the apex.  There is about a 50% to 55% mid

LAD lesion.  The dissection is evident but the flow is good all the way to the apex.

Diagonal and septal branches are free of significant disease.  LAD therefore has

intrinsic mid dissection, but flow is preserved all the way to the apex and the lesion

is no more than 50% to 55%.  Septal and diagonal branches are coming off before the

dissection.  They are free of significant disease and supplies a fair amount of

myocardium.



RAMUS INTERMEDIUS: Fair caliber, small distribution vessel has minor irregularities. No

significant disease.



LEFT POSTERIOR CIRCUMFLEX CORONARY ARTERY: Good caliber vessel,  runs distally, gives

off a posterolateral branch.  No significant disease. Minor irregularities.



LEFT INTERNAL MAMMARY ARTERY GRAFT TO LAD: This graft is widely patent in the proximal

portion, but the flow is somewhat limited because of competitive flow.  I gave

nitroglycerin and I was able to see the flow all the way to the LAD and the LAD was

opacified towards the apex, but the flow appears to be decreased because of competitive

flow from the native circulation.



LEFT VENTRICULOGRAM: This was performed in 30-degree HERNANDEZ projection revealed left

ventricle is of normal size with mild hypokinesia involving the apex and estimated

ejection fraction of about 50%.  No mitral regurgitation was noted.



FINAL IMPRESSION:

This patient has slightly elevated filling pressures.  No gradient across aortic valve.

The right-dominant system with RCA, ramus and circumflex being free of significant

disease.  LAD has a long 55% lesion with intrinsic dissection but decent flow. LIMA to

LAD is patent but the flow is somewhat compromised mainly because of competitive flow.

No gradient across aortic valve.  Ejection fraction is 50% with apical hypokinesia.



RECOMMENDATIONS:

I am recommending that she can proceed with the bariatric surgery.  I would recommend

cautious fluid administration, optimal BP control perioperatively.  The patient will be

treated medically and will be discharged later on today and I will see her in the

office next week.  She will continue beta blocker, statin agent, aspirin, and Eliquis.

I will resume the Eliquis tomorrow.  Discussed my thoughts in detail with the patient

and her family.  She will be discharged later on today.





MMODL / IJN: [id number] / Job#: [id number]

## 2022-08-29 ENCOUNTER — HOSPITAL ENCOUNTER (EMERGENCY)
Dept: HOSPITAL 47 - EC | Age: 42
Discharge: HOME | End: 2022-08-29
Payer: COMMERCIAL

## 2022-08-29 VITALS
RESPIRATION RATE: 18 BRPM | HEART RATE: 92 BPM | DIASTOLIC BLOOD PRESSURE: 84 MMHG | TEMPERATURE: 98.5 F | SYSTOLIC BLOOD PRESSURE: 147 MMHG

## 2022-08-29 DIAGNOSIS — Z88.0: ICD-10-CM

## 2022-08-29 DIAGNOSIS — Z91.09: ICD-10-CM

## 2022-08-29 DIAGNOSIS — Z88.8: ICD-10-CM

## 2022-08-29 DIAGNOSIS — I82.612: Primary | ICD-10-CM

## 2022-08-29 DIAGNOSIS — Z88.1: ICD-10-CM

## 2022-08-29 DIAGNOSIS — Z95.1: ICD-10-CM

## 2022-08-29 DIAGNOSIS — I25.2: ICD-10-CM

## 2022-08-29 DIAGNOSIS — Z79.899: ICD-10-CM

## 2022-08-29 DIAGNOSIS — K21.9: ICD-10-CM

## 2022-08-29 DIAGNOSIS — Z88.5: ICD-10-CM

## 2022-08-29 DIAGNOSIS — Z91.040: ICD-10-CM

## 2022-08-29 DIAGNOSIS — Z79.01: ICD-10-CM

## 2022-08-29 DIAGNOSIS — I25.10: ICD-10-CM

## 2022-08-29 DIAGNOSIS — F17.200: ICD-10-CM

## 2022-08-29 DIAGNOSIS — Z86.73: ICD-10-CM

## 2022-08-29 DIAGNOSIS — Z86.16: ICD-10-CM

## 2022-08-29 PROCEDURE — 96372 THER/PROPH/DIAG INJ SC/IM: CPT

## 2022-08-29 PROCEDURE — 93971 EXTREMITY STUDY: CPT

## 2022-08-29 PROCEDURE — 99283 EMERGENCY DEPT VISIT LOW MDM: CPT

## 2022-08-29 NOTE — ED
General Adult HPI





- General


Chief complaint: Extremity Problem,Nontraumatic


Stated complaint: Blood Clot


Time Seen by Provider: 22 17:30


Source: patient, family, RN notes reviewed, old records reviewed


Mode of arrival: ambulatory


Limitations: no limitations





- History of Present Illness


Initial comments: 





This is a 42-year-old female presents to the emergency room with left upper arm 

pain, redness and swelling sent by her primary care doctor to rule out DVT.  

Patient states that she had an elevated d-dimer was seen in the hospital last 

 had a CT scan that was negative.  She came back on Tuesday had ultrasound

of her lower extremity to rule out DVT which was negative.  She developed 

swelling of the left upper arm which is where she had an IV last Tuesday and her

doctor's concern for blood clot.  She denies any shortness of breath or chest 

pain.  She states that she is no longer on any blood thinners.


-: days(s) (2)


Location: left, upper extremity


Radiation: non-radiation


Severity scale (1-10): 4


Quality: constant


Consistency: constant


Improves with: none


Worsens with: other (Palpation)





- Related Data


                                Home Medications











 Medication  Instructions  Recorded  Confirmed


 


Apixaban [Eliquis] 5 mg PO BID 10/30/18 01/22/21


 


Metoprolol Tartrate 25 mg PO BID 19


 


Isosorbide Mononitrate ER [Imdur] 30 mg PO DAILY 10/21/20 01/22/21


 


Atorvastatin Calcium [Lipitor] 20 mg PO HS 20


 


Varenicline [Chantix Continuing 1 mg PO BID 21





Pack]   


 


raNITIdine HCL [Zantac] 150 mg PO BID PRN 21








                                  Previous Rx's











 Medication  Instructions  Recorded


 


Nitroglycerin Sl Tabs [Nitrostat] 0.4 mg SUBLINGUAL Q5M PRN #20 tab 20











                                    Allergies











Allergy/AdvReac Type Severity Reaction Status Date / Time


 


adhesive Allergy  Rash/Hives Verified 22 17:25


 


albuterol Allergy  Anaphylaxis Verified 22 17:25


 


amoxicillin Allergy  Anaphylaxis Verified 22 17:25


 


ampicillin Allergy  Anaphylaxis Verified 22 17:25


 


azithromycin [From Zithromax] Allergy  Anaphylaxis Verified 22 17:25


 


erythromycin base Allergy  Anaphylaxis Verified 22 17:25


 


latex Allergy  Rash/Hives Verified 22 17:25


 


penicillin V Allergy  Anaphylaxis Verified 22 17:25


 


morphine AdvReac  Hallucinati Verified 22 17:25





   ons  














Review of Systems


ROS Statement: 


Those systems with pertinent positive or pertinent negative responses have been 

documented in the HPI.





ROS Other: All systems not noted in ROS Statement are negative.





Past Medical History


Past Medical History: Coronary Artery Disease (CAD), Chest Pain / Angina, 

CVA/TIA, Deep Vein Thrombosis (DVT), GERD/Reflux, Hyperlipidemia, Myocardial 

Infarction (MI), Pulmonary Embolus (PE)


Additional Past Medical History / Comment(s): CABG, Covid 19,


Last Myocardial Infarction Date:: 2018


History of Any Multi-Drug Resistant Organisms: MRSA


Date of last positivie culture/infection: 


MDRO Source:: abdomen


Past Surgical History:  Section, Cholecystectomy, Coronary Bypass/CABG, 

Heart Catheterization


Additional Past Surgical History / Comment(s): Right wrist surgery, carpal 

tunnel, MRSA S/P wound, left knee surgery, had a miscarrage, CABG


Past Anesthesia/Blood Transfusion Reactions: Postoperative Nausea & Vomiting 

(PONV)


Past Psychological History: ADD/ADHD


Smoking Status: Current every day smoker


Past Alcohol Use History: None Reported


Past Drug Use History: None Reported





- Past Family History


  ** Mother


Family Medical History: Cancer, CVA/TIA, Diabetes Mellitus, Deep Vein Thrombosis

(DVT), Myocardial Infarction (MI), Pulmonary Embolus, Renal Disease


Additional Family Medical History / Comment(s): Uterine cancer, artificial heart

valves, and kidney disease, .





  ** Father


Additional Family Medical History / Comment(s): PAD





  ** Brother(s)


Family Medical History: Coronary Artery Disease (CAD), Diabetes Mellitus


Additional Family Medical History / Comment(s): 2 HEART STENTS, issue with heart

valve





  ** Sister(s)


Additional Family Medical History / Comment(s): psych issues





  ** Daughter(s)


Family Medical History: No Reported History





  ** Son(s)


Family Medical History: No Reported History





General Exam


Limitations: no limitations


General appearance: alert, in no apparent distress


Head exam: Present: atraumatic


Eye exam: Present: normal appearance.  Absent: scleral icterus, conjunctival 

injection, periorbital swelling, periorbital tenderness


Respiratory exam: Absent: respiratory distress, accessory muscle use


Cardiovascular Exam: Present: regular rate


  ** Left


Upper Arm exam: Present: full ROM, tenderness, swelling, erythema (4 cm circular

area of erythema patient outlined in black marker yesterday, redness extending 

outside marked area)


Elbow exam: Present: normal inspection, full ROM.  Absent: tenderness


Forearm Wrist exam: Present: normal inspection, full ROM.  Absent: tenderness


Hand Wrist exam: Present: normal inspection, full ROM.  Absent: tenderness


Neurosensory exam: Present: radial nerve intact, ulnar nerve intact, median 

nerve intact


Vascular: Present: normal capillary refill.  Absent: vascular compromise


Neurological exam: Present: alert, oriented X3


Psychiatric exam: Present: normal affect, normal mood


Skin exam: Present: warm, dry.  Absent: cyanosis, diaphoretic, petechiae, pallor





Course


                                   Vital Signs











  22





  17:23


 


Temperature 98.5 F


 


Pulse Rate 92


 


Respiratory 18





Rate 


 


Blood Pressure 147/84


 


O2 Sat by Pulse 99





Oximetry 














Medical Decision Making





- Medical Decision Making





Ultrasound shows no evidence of DVT.  There is a superficial vein thrombosis of 

the cephalic vein likely from her IV insertion.


Radial pulses present with capillary refill less than 2 seconds.  She has full 

range of motion.  No concern for cellulitis at this time.


She'll be directed to use warm compresses and follow up with her primary care as

needed. She was agreeable to this plan of care. 


Case discussed with her [facility].





Disposition


Clinical Impression: 


 Superficial venous thrombosis of arm





Disposition: HOME SELF-CARE


Condition: Good


Instructions (If sedation given, give patient instructions):  Superficial 

Thrombophlebitis (ED)


Additional Instructions: 


Patient presents with left arm redness and pain concern for DVT.  Ultrasound 

negative for DVT.  This is the same as she received an IV.  Pulses are present


Is patient prescribed a controlled substance at d/c from ED?: No


Referrals: 


Katy Tay MD [Primary Care Provider] - 1-2 days


Time of Disposition: 19:07

## 2022-08-29 NOTE — US
EXAMINATION TYPE: US venous doppler duplex UE LT

 

DATE OF EXAM: 8/29/2022

 

COMPARISON: NONE

 

CLINICAL HISTORY: r/o dvt sent by PCP. Redness in upper lateral arm

 

SIDE PERFORMED: Left

 

 

 

 

Left Arm: No evidence of DVT. Echoes seen in cephalic vein from upper arm to elbow.

 

 

 

IMPRESSION: 

There is no deep vein thrombosis in the left arm. There is superficial vein thrombosis in the cephali
c vein.

## 2022-11-10 ENCOUNTER — HOSPITAL ENCOUNTER (OUTPATIENT)
Dept: HOSPITAL 47 - RADXRYALE | Age: 42
Discharge: HOME | End: 2022-11-10
Attending: INTERNAL MEDICINE
Payer: COMMERCIAL

## 2022-11-10 DIAGNOSIS — M79.89: Primary | ICD-10-CM

## 2022-11-10 NOTE — XR
EXAMINATION TYPE: XR foot complete LT

 

DATE OF EXAM: 11/10/2022 10:34 AM

 

INDICATION: 

Patient age:Female;  42 years old; 

Reason for study: [id number] LT TOE PAIN; [facility]. 

 

COMPARISON: None

 

TECHNIQUE: The left foot was examined in the AP, oblique, and lateral projections.  

 

FINDINGS: 

No evidence of any acute osseous pathology. No osseous erosions. Mild soft tissue swelling over the d
orsal aspect of the metatarsals. Joints are preserved. No radiopaque foreign body. Small plantar calc
aneal enthesophyte.

 

IMPRESSION: 

1.  No evidence of acute fracture.

2.  Mild soft tissue swelling over the dorsal aspect of the metatarsals.

## 2023-07-26 ENCOUNTER — HOSPITAL ENCOUNTER (EMERGENCY)
Dept: HOSPITAL 47 - EC | Age: 43
Discharge: HOME | End: 2023-07-26
Payer: COMMERCIAL

## 2023-07-26 VITALS — RESPIRATION RATE: 16 BRPM | DIASTOLIC BLOOD PRESSURE: 82 MMHG | HEART RATE: 82 BPM | SYSTOLIC BLOOD PRESSURE: 127 MMHG

## 2023-07-26 VITALS — TEMPERATURE: 98.2 F

## 2023-07-26 DIAGNOSIS — Z88.8: ICD-10-CM

## 2023-07-26 DIAGNOSIS — Z91.040: ICD-10-CM

## 2023-07-26 DIAGNOSIS — R00.2: Primary | ICD-10-CM

## 2023-07-26 DIAGNOSIS — M79.89: ICD-10-CM

## 2023-07-26 DIAGNOSIS — Z86.16: ICD-10-CM

## 2023-07-26 DIAGNOSIS — E78.5: ICD-10-CM

## 2023-07-26 DIAGNOSIS — I25.2: ICD-10-CM

## 2023-07-26 DIAGNOSIS — Z88.1: ICD-10-CM

## 2023-07-26 DIAGNOSIS — Z88.0: ICD-10-CM

## 2023-07-26 DIAGNOSIS — Z95.1: ICD-10-CM

## 2023-07-26 DIAGNOSIS — I25.10: ICD-10-CM

## 2023-07-26 DIAGNOSIS — Z91.09: ICD-10-CM

## 2023-07-26 DIAGNOSIS — Z88.5: ICD-10-CM

## 2023-07-26 DIAGNOSIS — Z86.73: ICD-10-CM

## 2023-07-26 DIAGNOSIS — F17.200: ICD-10-CM

## 2023-07-26 LAB
ALBUMIN SERPL-MCNC: 3.9 G/DL (ref 3.5–5)
ALP SERPL-CCNC: 80 U/L (ref 38–126)
ALT SERPL-CCNC: 24 U/L (ref 4–34)
ANION GAP SERPL CALC-SCNC: 8 MMOL/L
APTT BLD: 22.6 SEC (ref 22–30)
AST SERPL-CCNC: 24 U/L (ref 14–36)
BASOPHILS # BLD AUTO: 0 K/UL (ref 0–0.2)
BASOPHILS NFR BLD AUTO: 0 %
BUN SERPL-SCNC: 12 MG/DL (ref 7–17)
CALCIUM SPEC-MCNC: 9.3 MG/DL (ref 8.4–10.2)
CHLORIDE SERPL-SCNC: 104 MMOL/L (ref 98–107)
CO2 SERPL-SCNC: 26 MMOL/L (ref 22–30)
EOSINOPHIL # BLD AUTO: 0.3 K/UL (ref 0–0.7)
EOSINOPHIL NFR BLD AUTO: 2 %
ERYTHROCYTE [DISTWIDTH] IN BLOOD BY AUTOMATED COUNT: 4.64 M/UL (ref 3.8–5.4)
ERYTHROCYTE [DISTWIDTH] IN BLOOD: 14 % (ref 11.5–15.5)
GLUCOSE SERPL-MCNC: 94 MG/DL (ref 74–99)
HCT VFR BLD AUTO: 41.8 % (ref 34–46)
HGB BLD-MCNC: 13.6 GM/DL (ref 11.4–16)
INR PPP: 0.9 (ref ?–1.2)
LYMPHOCYTES # SPEC AUTO: 3.7 K/UL (ref 1–4.8)
LYMPHOCYTES NFR SPEC AUTO: 34 %
MAGNESIUM SPEC-SCNC: 2 MG/DL (ref 1.6–2.3)
MCH RBC QN AUTO: 29.2 PG (ref 25–35)
MCHC RBC AUTO-ENTMCNC: 32.5 G/DL (ref 31–37)
MCV RBC AUTO: 90.1 FL (ref 80–100)
MONOCYTES # BLD AUTO: 0.4 K/UL (ref 0–1)
MONOCYTES NFR BLD AUTO: 4 %
NEUTROPHILS # BLD AUTO: 6.1 K/UL (ref 1.3–7.7)
NEUTROPHILS NFR BLD AUTO: 58 %
PLATELET # BLD AUTO: 281 K/UL (ref 150–450)
POTASSIUM SERPL-SCNC: 4.2 MMOL/L (ref 3.5–5.1)
PROT SERPL-MCNC: 6.7 G/DL (ref 6.3–8.2)
PT BLD: 9.7 SEC (ref 9–12)
SODIUM SERPL-SCNC: 138 MMOL/L (ref 137–145)
WBC # BLD AUTO: 10.6 K/UL (ref 3.8–10.6)

## 2023-07-26 PROCEDURE — 84443 ASSAY THYROID STIM HORMONE: CPT

## 2023-07-26 PROCEDURE — 99285 EMERGENCY DEPT VISIT HI MDM: CPT

## 2023-07-26 PROCEDURE — 71046 X-RAY EXAM CHEST 2 VIEWS: CPT

## 2023-07-26 PROCEDURE — 83735 ASSAY OF MAGNESIUM: CPT

## 2023-07-26 PROCEDURE — 85730 THROMBOPLASTIN TIME PARTIAL: CPT

## 2023-07-26 PROCEDURE — 80053 COMPREHEN METABOLIC PANEL: CPT

## 2023-07-26 PROCEDURE — 93005 ELECTROCARDIOGRAM TRACING: CPT

## 2023-07-26 PROCEDURE — 85610 PROTHROMBIN TIME: CPT

## 2023-07-26 PROCEDURE — 84484 ASSAY OF TROPONIN QUANT: CPT

## 2023-07-26 PROCEDURE — 85025 COMPLETE CBC W/AUTO DIFF WBC: CPT

## 2023-07-26 PROCEDURE — 36415 COLL VENOUS BLD VENIPUNCTURE: CPT

## 2023-07-26 NOTE — XR
EXAMINATION TYPE: XR chest 2V

 

DATE OF EXAM: 7/26/2023

 

COMPARISON: 11/11/2020

 

INDICATION: Dysrhythmia

 

TECHNIQUE:  Frontal and lateral views of the chest are obtained.

 

FINDINGS:  

The heart size is normal.  

The pulmonary vasculature is normal.

The lungs are clear.

 

IMPRESSION:  

1. No acute pulmonary process.

## 2023-07-26 NOTE — US
EXAMINATION TYPE: US venous doppler duplex LE LT

 

DATE OF EXAM: 7/26/2023 6:47 PM

 

COMPARISON: 8/23/22

 

CLINICAL INDICATION: Female, 43 years old with history of Swollen left leg; Hx of DVT in lt leg in 20
18. Not on blood thinners. Pt states discoloration and swelling x months

 

SIDE PERFORMED: Left  

 

TECHNIQUE:  The lower extremity deep venous system is examined utilizing real time linear array sonog
arnaldo with graded compression, doppler sonography and color-flow sonography.

 

VESSELS IMAGED:

Common Femoral Vein

Deep Femoral Vein

Greater Saphenous Vein *

Femoral Vein

Popliteal Vein

Small Saphenous Vein *

Proximal Calf Veins

(* superficial vessels)

 

 

 

 

 

Left Leg:  No evidence for DVT

 

 

 

IMPRESSION:

1. Left lower extremity ultrasound negative for deep venous thrombosis.

## 2023-07-26 NOTE — ED
General Adult HPI





- General


Chief complaint: Arrhythmia/Palpitations


Stated complaint: heart palp,left leg pain


Time Seen by Provider: 23 15:50


Source: patient, RN notes reviewed, old records reviewed


Mode of arrival: ambulatory


Limitations: no limitations





- History of Present Illness


Initial comments: 





This is a 43-year-old female presents emergency department stating that over the

last few weeks she's been having intermittent palpitations.  Patient states she 

has a heart monitor on currently.  But she also is coming in because she is 

having some swelling to her left leg and she calf tenderness in the left side.  

Patient states she's had a clot that before when she had bypass surgery.  

Patient doesn't know exactly how long that leg is been swollen but it is been 

for at least a couple of weeks.  Patient denies any fever chills per patient 

denies of breath or difficulty breathing.  Patient denies any back pain.  

Patient denies any abdominal pain.  patient denies nausea vomiting diarrhea 

patient is currently not having any palpitations





- Related Data


                                Home Medications











 Medication  Instructions  Recorded  Confirmed


 


Metoprolol Tartrate 25 mg PO BID 19


 


Isosorbide Mononitrate ER [Imdur] 30 mg PO DAILY 10/21/20 07/26/23


 


Furosemide [Lasix] 20 mg PO DAILY 23


 


Ibuprofen [Motrin] 600 mg PO Q6H PRN 23


 


Rosuvastatin [Crestor] 10 mg PO DAILY 23








                                  Previous Rx's











 Medication  Instructions  Recorded


 


Nitroglycerin Sl Tabs [Nitrostat] 0.4 mg SUBLINGUAL Q5M PRN #20 tab 20











                                    Allergies











Allergy/AdvReac Type Severity Reaction Status Date / Time


 


adhesive Allergy  Rash/Hives Verified 23 16:47


 


albuterol Allergy  Anaphylaxis Verified 23 16:47


 


amoxicillin Allergy  Anaphylaxis Verified 23 16:47


 


ampicillin Allergy  Anaphylaxis Verified 23 16:47


 


azithromycin [From Zithromax] Allergy  Anaphylaxis Verified 23 16:47


 


erythromycin base Allergy  Anaphylaxis Verified 23 16:47


 


latex Allergy  Rash/Hives Verified 23 16:47


 


penicillin V Allergy  Anaphylaxis Verified 23 16:47


 


morphine AdvReac  Hallucinati Verified 23 16:47





   ons  














Review of Systems


ROS Statement: 


Those systems with pertinent positive or pertinent negative responses have been 

documented in the HPI.





ROS Other: All systems not noted in ROS Statement are negative.





Past Medical History


Past Medical History: Coronary Artery Disease (CAD), Chest Pain / Angina, 

CVA/TIA, Deep Vein Thrombosis (DVT), GERD/Reflux, Hyperlipidemia, Myocardial 

Infarction (MI), Pulmonary Embolus (PE)


Additional Past Medical History / Comment(s): CABG, Covid 19,


Last Myocardial Infarction Date:: 2018


History of Any Multi-Drug Resistant Organisms: MRSA


Date of last positivie culture/infection: 


MDRO Source:: abdomen


Past Surgical History:  Section, Cholecystectomy, Coronary Bypass/CABG, 

Heart Catheterization


Additional Past Surgical History / Comment(s): Right wrist surgery, carpal 

tunnel, MRSA S/P wound, left knee surgery, had a miscarrage, CABG


Past Anesthesia/Blood Transfusion Reactions: Postoperative Nausea & Vomiting 

(PONV)


Past Psychological History: ADD/ADHD


Smoking Status: Current every day smoker


Past Alcohol Use History: None Reported


Past Drug Use History: None Reported





- Past Family History


  ** Mother


Family Medical History: Cancer, CVA/TIA, Diabetes Mellitus, Deep Vein Thrombosis

(DVT), Myocardial Infarction (MI), Pulmonary Embolus, Renal Disease


Additional Family Medical History / Comment(s): Uterine cancer, artificial heart

valves, and kidney disease, .





  ** Father


Additional Family Medical History / Comment(s): PAD





  ** Brother(s)


Family Medical History: Coronary Artery Disease (CAD), Diabetes Mellitus


Additional Family Medical History / Comment(s): 2 HEART STENTS, issue with heart

valve





  ** Sister(s)


Additional Family Medical History / Comment(s): psych issues





  ** Daughter(s)


Family Medical History: No Reported History





  ** Son(s)


Family Medical History: No Reported History





General Exam





- General Exam Comments


Initial Comments: 





GENERAL:


Patient is well-developed and well-nourished.  Patient is nontoxic and well-

hydrated and is in mild distress.





ENT:


Neck is soft and supple.  No significant lymphadenopathy is noted.  Oropharynx 

is clear.  Moist mucous membranes.  Neck has full range of motion without 

eliciting any pain.  





EYES:


The sclera were anicteric and conjunctiva were pink and moist.  Extraocular 

movements were intact and pupils were equal round and reactive to light.  

Eyelids were unremarkable.





PULMONARY:


Unlabored respirations.  Good breath sounds bilaterally.  No audible rales rho

nchi or wheezing was noted.





CARDIOVASCULAR:


There is a regular rate and rhythm without any murmurs gallops or rubs.  





ABDOMEN:


Soft and nontender with normal bowel sounds. 





SKIN:


Skin is clear with no lesions or rashes and otherwise unremarkable.





NEUROLOGIC:


Patient is alert and oriented x3.  Cranial nerves II through XII are grossly 

intact.  Motor and sensory are also intact.  Normal speech, volume and content. 

Symmetrical smile. 





MUSCULOSKELETAL:


Normal extremities with adequate strength and full range of motion.  Patient has

some Tenderness in the left calf





LYMPHATICS:


No significant lymphadenopathy is noted





PSYCHIATRIC:


Normal psychiatric evaluation. 


Limitations: no limitations





Course


                                   Vital Signs











  23





  15:27


 


Temperature 98.2 F


 


Pulse Rate 91


 


Respiratory 20





Rate 


 


Blood Pressure 127/77


 


O2 Sat by Pulse 96





Oximetry 














Medical Decision Making





- Medical Decision Making





EKG is interpreted by myself.  It shows a sinus rhythm at 86 bpm NH interval 276

QRS is 68 QT interval 3:30 QTC is 382.  Patient's EKG shows no ST segment 

elevation or depression.





Was pt. sent in by a medical professional or institution (, PA, NP, urgent 

care, hospital, or nursing home...) When possible be specific


@  -No


Did you speak to anyone other than the patient for history (EMS, parent, family,

police, friend...)? What history was obtained from this source 


@  -No


Did you review nursing and triage notes (agree or disagree)?  Why? 


@  -I reviewed and agree with nursing and triage notes


Were old charts reviewed (outside hosp., previous admission, EMS record, old 

EKG, old radiological studies, urgent care reports/EKG's, nursing home records)?

Report findings 


@  -I reviewed prior lab work in prior charts of this patient


Differential Diagnosis (chest pain, altered mental status, abdominal pain women,

abdominal pain men, vaginal bleeding, weakness, fever, dyspnea, syncope, 

headache, dizziness, GI bleed, back pain, seizure, CVA, palpatations, mental 

health, musculoskeletal)? 


@  -Differential Palpitations


Ventricular arrhythmias, atrial arrhythmias, myocardial infarction, anemia, 

thyrotoxicosis, electrolyte imbalance, hypokalemia, pulmonary embolism, pulmona

ry disease, drugs, alcohol, anxiety, stress....


This is not meant to be an all-inclusive list.


EKG interpreted by me (3pts min.).


@  -As above


X-rays interpreted by me (1pt min.).


@  -Chest x-ray showed no acute abnormality


CT interpreted by me (1pt min.).


@  -None done


U/S interpreted by me (1pt. min.).


@  -Ultrasound of the left leg showed no DVT


What testing was considered but not performed or refused? (CT, X-rays, U/S, 

labs)? Why?


@  -None


What meds were considered but not given or refused? Why?


@  -None


Did you discuss the management of the patient with other professionals 

(professionals i.e. Dr., PA, NP, lab, RT, psych nurse, , , 

teacher, , )? Give summary


@  -No


Was smoking cessation discussed for >3mins.?


@  -No


Was critical care preformed (if so, how long)?


@  -No


Were there social determinants of health that impacted care today? How? 

(Homelessness, low income, unemployed, alcoholism, drug addiction, 

transportation, low edu. Level, literacy, decrease access to med. care, halfway, 

rehab)?


@  -No


Was there de-escalation of care discussed even if they declined (Discuss DNR or 

withdrawal of care, Hospice)? DNR status


@  -No


What co-morbidities impacted this encounter? (DM, HTN, Smoking, COPD, CAD, 

Cancer, CVA, ARF, Chemo, Hep., AIDS, mental health diagnosis, sleep apnea, 

morbid obesity)?


@  -None


Was patient admitted / discharged? Hospital course, mention meds given and 

route, prescriptions, significant lab abnormalities, going to OR and other 

pertinent info.


@  -I went back into the room to speak to the patient about her results and she 

was sleeping I woke her and discuss results with her she agreed that she only 

has follow up for palpitations and she is glad that she doesn't have DVT.


Undiagnosed new problem with uncertain prognosis?


@  -No


Drug Therapy requiring intensive monitoring for toxicity (Heparin, Nitro, 

Insulin, Cardizem)?


@  -No


Were any procedures done?


@  -No


Diagnosis/symptom?


@  -Palpitations


Acute, or Chronic, or Acute on Chronic?


@  -Acute


Uncomplicated (without systemic symptoms) or Complicated (systemic symptoms)?


@  -Complicated


Side effects of treatment?


@  -No


Exacerbation, Progression, or Severe Exacerbation?


@  -No


Poses a threat to life or bodily function? How? (Chest pain, USA, MI, pneumonia,

PE, COPD, DKA, ARF, appy, cholecystitis, CVA, Diverticulitis, Homicidal, 

Suicidal, threat to staff... and all critical care pts)


@  -No


Diagnosis/symptom?


@  -Swelling left leg


Acute, or Chronic, or Acute on Chronic?


@  -Acute


Uncomplicated (without systemic symptoms) or Complicated (systemic symptoms)?


@  -Uncomplicated


Side effects of treatment?


@  -none


Exacerbation, Progression, or Severe Exacerbation]


@  -no


Poses a threat to life or bodily function?


@  -no





- Lab Data


Result diagrams: 


                                 23 17:48





                                 23 17:48


                                   Lab Results











  23 Range/Units





  17:48 17:48 17:48 


 


WBC  10.6    (3.8-10.6)  k/uL


 


RBC  4.64    (3.80-5.40)  m/uL


 


Hgb  13.6    (11.4-16.0)  gm/dL


 


Hct  41.8    (34.0-46.0)  %


 


MCV  90.1    (80.0-100.0)  fL


 


MCH  29.2    (25.0-35.0)  pg


 


MCHC  32.5    (31.0-37.0)  g/dL


 


RDW  14.0    (11.5-15.5)  %


 


Plt Count  281    (150-450)  k/uL


 


MPV  7.8    


 


Neutrophils %  58    %


 


Lymphocytes %  34    %


 


Monocytes %  4    %


 


Eosinophils %  2    %


 


Basophils %  0    %


 


Neutrophils #  6.1    (1.3-7.7)  k/uL


 


Lymphocytes #  3.7    (1.0-4.8)  k/uL


 


Monocytes #  0.4    (0-1.0)  k/uL


 


Eosinophils #  0.3    (0-0.7)  k/uL


 


Basophils #  0.0    (0-0.2)  k/uL


 


PT   9.7   (9.0-12.0)  sec


 


INR   0.9   (<1.2)  


 


APTT   22.6   (22.0-30.0)  sec


 


Sodium    138  (137-145)  mmol/L


 


Potassium    4.2  (3.5-5.1)  mmol/L


 


Chloride    104  ()  mmol/L


 


Carbon Dioxide    26  (22-30)  mmol/L


 


Anion Gap    8  mmol/L


 


BUN    12  (7-17)  mg/dL


 


Creatinine    0.77  (0.52-1.04)  mg/dL


 


Est GFR (CKD-EPI)AfAm    >90  (>60 ml/min/1.73 sqM)  


 


Est GFR (CKD-EPI)NonAf    >90  (>60 ml/min/1.73 sqM)  


 


Glucose    94  (74-99)  mg/dL


 


Calcium    9.3  (8.4-10.2)  mg/dL


 


Magnesium    2.0  (1.6-2.3)  mg/dL


 


Total Bilirubin    0.3  (0.2-1.3)  mg/dL


 


AST    24  (14-36)  U/L


 


ALT    24  (4-34)  U/L


 


Alkaline Phosphatase    80  ()  U/L


 


Troponin I     (0.000-0.034)  ng/mL


 


Total Protein    6.7  (6.3-8.2)  g/dL


 


Albumin    3.9  (3.5-5.0)  g/dL


 


TSH    1.550  (0.465-4.680)  mIU/L














  23 Range/Units





  17:48 


 


WBC   (3.8-10.6)  k/uL


 


RBC   (3.80-5.40)  m/uL


 


Hgb   (11.4-16.0)  gm/dL


 


Hct   (34.0-46.0)  %


 


MCV   (80.0-100.0)  fL


 


MCH   (25.0-35.0)  pg


 


MCHC   (31.0-37.0)  g/dL


 


RDW   (11.5-15.5)  %


 


Plt Count   (150-450)  k/uL


 


MPV   


 


Neutrophils %   %


 


Lymphocytes %   %


 


Monocytes %   %


 


Eosinophils %   %


 


Basophils %   %


 


Neutrophils #   (1.3-7.7)  k/uL


 


Lymphocytes #   (1.0-4.8)  k/uL


 


Monocytes #   (0-1.0)  k/uL


 


Eosinophils #   (0-0.7)  k/uL


 


Basophils #   (0-0.2)  k/uL


 


PT   (9.0-12.0)  sec


 


INR   (<1.2)  


 


APTT   (22.0-30.0)  sec


 


Sodium   (137-145)  mmol/L


 


Potassium   (3.5-5.1)  mmol/L


 


Chloride   ()  mmol/L


 


Carbon Dioxide   (22-30)  mmol/L


 


Anion Gap   mmol/L


 


BUN   (7-17)  mg/dL


 


Creatinine   (0.52-1.04)  mg/dL


 


Est GFR (CKD-EPI)AfAm   (>60 ml/min/1.73 sqM)  


 


Est GFR (CKD-EPI)NonAf   (>60 ml/min/1.73 sqM)  


 


Glucose   (74-99)  mg/dL


 


Calcium   (8.4-10.2)  mg/dL


 


Magnesium   (1.6-2.3)  mg/dL


 


Total Bilirubin   (0.2-1.3)  mg/dL


 


AST   (14-36)  U/L


 


ALT   (4-34)  U/L


 


Alkaline Phosphatase   ()  U/L


 


Troponin I  <0.012  (0.000-0.034)  ng/mL


 


Total Protein   (6.3-8.2)  g/dL


 


Albumin   (3.5-5.0)  g/dL


 


TSH   (0.465-4.680)  mIU/L














Disposition


Clinical Impression: 


 Palpitations, Left leg swelling





Disposition: HOME SELF-CARE


Condition: Good


Instructions (If sedation given, give patient instructions):  Heart Palpitations

(ED)


Is patient prescribed a controlled substance at d/c from ED?: No


Referrals: 


Bobby Marin MD [Primary Care Provider] - 1-2 days


Time of Disposition: 19:36

## 2023-09-06 ENCOUNTER — HOSPITAL ENCOUNTER (EMERGENCY)
Dept: HOSPITAL 47 - EC | Age: 43
Discharge: HOME | End: 2023-09-06
Payer: COMMERCIAL

## 2023-09-06 VITALS — RESPIRATION RATE: 16 BRPM

## 2023-09-06 VITALS — TEMPERATURE: 99.2 F

## 2023-09-06 VITALS — HEART RATE: 65 BPM | SYSTOLIC BLOOD PRESSURE: 140 MMHG | DIASTOLIC BLOOD PRESSURE: 75 MMHG

## 2023-09-06 DIAGNOSIS — Z88.5: ICD-10-CM

## 2023-09-06 DIAGNOSIS — Z90.49: ICD-10-CM

## 2023-09-06 DIAGNOSIS — Z91.040: ICD-10-CM

## 2023-09-06 DIAGNOSIS — I25.2: ICD-10-CM

## 2023-09-06 DIAGNOSIS — F17.200: ICD-10-CM

## 2023-09-06 DIAGNOSIS — F12.90: ICD-10-CM

## 2023-09-06 DIAGNOSIS — Z88.1: ICD-10-CM

## 2023-09-06 DIAGNOSIS — I25.10: ICD-10-CM

## 2023-09-06 DIAGNOSIS — Z79.899: ICD-10-CM

## 2023-09-06 DIAGNOSIS — E78.5: ICD-10-CM

## 2023-09-06 DIAGNOSIS — Z86.16: ICD-10-CM

## 2023-09-06 DIAGNOSIS — Z88.8: ICD-10-CM

## 2023-09-06 DIAGNOSIS — S93.401A: Primary | ICD-10-CM
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
## 2023-09-06 DIAGNOSIS — Z86.59: ICD-10-CM

## 2023-09-06 DIAGNOSIS — Z88.6: ICD-10-CM

## 2023-09-06 DIAGNOSIS — Z95.1: ICD-10-CM

## 2023-09-06 DIAGNOSIS — Z88.0: ICD-10-CM

## 2023-09-06 PROCEDURE — 99284 EMERGENCY DEPT VISIT MOD MDM: CPT

## 2023-09-06 PROCEDURE — 96372 THER/PROPH/DIAG INJ SC/IM: CPT

## 2023-09-06 PROCEDURE — 73610 X-RAY EXAM OF ANKLE: CPT

## 2023-09-06 NOTE — XR
EXAMINATION TYPE: XR ankle complete RT

 

DATE OF EXAM: 9/6/2023

 

COMPARISON: NONE

 

HISTORY: Fall, pain

 

TECHNIQUE:  Frontal, lateral and oblique images of the right ankle are obtained.

 

FINDINGS:  There is no acute fracture/dislocation evident. The joint spaces  appear within normal gil
its.  Small plantar and posterior calcaneal enthesophytes. Soft tissue swelling of the ankle.

 

IMPRESSION: 

1.  No acute fracture or dislocation seen.

2.  Moderate soft tissue swelling the ankle.

## 2023-09-06 NOTE — ED
General Adult HPI





- General


Chief complaint: Fall


Stated complaint: R foot injury-fall


Time Seen by Provider: 23 09:10


Source: patient, RN notes reviewed, old records reviewed


Mode of arrival: wheelchair


Limitations: no limitations





- History of Present Illness


Initial comments: 





43-year-old female presenting with right ankle injury.  Patient did fall with 

inversion of the right foot and swelling at the ankle.  She's had difficulty 

ambulate secondary to pain.  She has not taken any Tylenol or Motrin.  She 

states she was evaluated at outside emergency department and had x-rays 

performed which were reported as negative.  No other injury reported.





- Related Data


                                Home Medications











 Medication  Instructions  Recorded  Confirmed


 


Metoprolol Tartrate 25 mg PO BID 19


 


Isosorbide Mononitrate ER [Imdur] 30 mg PO DAILY 10/21/20 07/26/23


 


Furosemide [Lasix] 20 mg PO DAILY 23


 


Ibuprofen [Motrin] 600 mg PO Q6H PRN 23


 


Rosuvastatin [Crestor] 10 mg PO DAILY 23








                                  Previous Rx's











 Medication  Instructions  Recorded


 


Nitroglycerin Sl Tabs [Nitrostat] 0.4 mg SUBLINGUAL Q5M PRN #20 tab 20


 


Ibuprofen [Motrin] 600 mg PO Q8HR PRN #24 tab 23











                                    Allergies











Allergy/AdvReac Type Severity Reaction Status Date / Time


 


adhesive Allergy  Rash/Hives Verified 23 09:05


 


albuterol Allergy  Anaphylaxis Verified 23 09:05


 


amoxicillin Allergy  Anaphylaxis Verified 23 09:05


 


ampicillin Allergy  Anaphylaxis Verified 23 09:05


 


azithromycin [From Zithromax] Allergy  Anaphylaxis Verified 23 09:05


 


erythromycin base Allergy  Anaphylaxis Verified 23 09:05


 


latex Allergy  Rash/Hives Verified 23 09:05


 


penicillin V Allergy  Anaphylaxis Verified 23 09:05


 


morphine AdvReac  Hallucinati Verified 23 09:05





   ons  














Review of Systems


ROS Statement: 


Those systems with pertinent positive or pertinent negative responses have been 

documented in the HPI.





ROS Other: All systems not noted in ROS Statement are negative.





Past Medical History


Past Medical History: Coronary Artery Disease (CAD), Chest Pain / Angina, 

CVA/TIA, Deep Vein Thrombosis (DVT), GERD/Reflux, Hyperlipidemia, Myocardial 

Infarction (MI), Pulmonary Embolus (PE)


Additional Past Medical History / Comment(s): CABG, Covid 19,


Last Myocardial Infarction Date:: 2018


History of Any Multi-Drug Resistant Organisms: MRSA


Date of last positivie culture/infection: 


MDRO Source:: abdomen


Past Surgical History:  Section, Cholecystectomy, Coronary Bypass/CABG, 

Heart Catheterization


Additional Past Surgical History / Comment(s): Right wrist surgery, carpal 

tunnel, MRSA S/P wound, left knee surgery, had a miscarrage, CABG


Past Anesthesia/Blood Transfusion Reactions: Postoperative Nausea & Vomiting 

(PONV)


Past Psychological History: ADD/ADHD


Smoking Status: Current every day smoker


Past Alcohol Use History: Rare


Past Drug Use History: Marijuana





- Past Family History


  ** Mother


Family Medical History: Cancer, CVA/TIA, Diabetes Mellitus, Deep Vein Thrombosis

(DVT), Myocardial Infarction (MI), Pulmonary Embolus, Renal Disease


Additional Family Medical History / Comment(s): Uterine cancer, artificial heart

valves, and kidney disease, .





  ** Father


Additional Family Medical History / Comment(s): PAD





  ** Brother(s)


Family Medical History: Coronary Artery Disease (CAD), Diabetes Mellitus


Additional Family Medical History / Comment(s): 2 HEART STENTS, issue with heart

valve





  ** Sister(s)


Additional Family Medical History / Comment(s): psych issues





  ** Daughter(s)


Family Medical History: No Reported History





  ** Son(s)


Family Medical History: No Reported History





General Exam


Limitations: no limitations


General appearance: alert, in no apparent distress


Head exam: Present: atraumatic, normocephalic


Eye exam: Present: normal appearance, PERRL


ENT exam: Present: normal exam


Neck exam: Present: normal inspection.  Absent: tenderness, meningismus


Respiratory exam: Present: normal lung sounds bilaterally.  Absent: respiratory 

distress, wheezes


Cardiovascular Exam: Present: regular rate, normal rhythm


GI/Abdominal exam: Present: soft.  Absent: distended


Extremities exam: Present: joint swelling (Right ankle, swelling on the lateral 

aspect with tenderness to palpation, distal pulses intact.  Normal cap refill.)


Neurological exam: Present: alert, oriented X3


Psychiatric exam: Present: normal affect, normal mood


Skin exam: Present: warm, dry, intact





Course


                                   Vital Signs











  23





  09:01 09:16


 


Temperature 99.2 F 


 


Pulse Rate 92 89


 


Respiratory 18 16





Rate  


 


Blood Pressure 126/73 126/81


 


O2 Sat by Pulse 98 99





Oximetry  














Medical Decision Making





- Medical Decision Making





Was pt. sent in by a medical professional or institution (MICHELET Hill, NP, urgent 

care, hospital, or nursing home...) When possible be specific


@  -No


Did you speak to anyone other than the patient for history (EMS, parent, family,

police, friend...)? What history was obtained from this source 


@  -No


Did you review nursing and triage notes (agree or disagree)?  Why? 


@  -I reviewed and agree with nursing and triage notes


Were old charts reviewed (outside hosp., previous admission, EMS record, old 

EKG, old radiological studies, urgent care reports/EKG's, nursing home records)?

Report findings 


@  -No old charts were reviewed


Differential Diagnosis (chest pain, altered mental status, abdominal pain women,

abdominal pain men, vaginal bleeding, weakness, fever, dyspnea, syncope, 

headache, dizziness, GI bleed, back pain, seizure, CVA, palpatations, mental 

health, musculoskeletal)? 


@  -Right ankle fracture or dislocation, ankle sprain


EKG interpreted by me (3pts min.).


@  -As above


X-rays interpreted by me (1pt min.).


@  X-ray negative for fracture or dislocation


CT interpreted by me (1pt min.).


@  -None done


U/S interpreted by me (1pt. min.).


@  -None done


What testing was considered but not performed or refused? (CT, X-rays, U/S, 

labs)? Why?


@  -None


What meds were considered but not given or refused? Why?


@  -None


Did you discuss the management of the patient with other professionals 

(professionals i.e. MICHELET Hill, NP, lab, RT, psych nurse, , , 

teacher, , )? Give summary


@  -No


Was smoking cessation discussed for >3mins.?


@  -No


Was critical care preformed (if so, how long)?


@  -No


Were there social determinants of health that impacted care today? How? 

(Homelessness, low income, unemployed, alcoholism, drug addiction, 

transportation, low edu. Level, literacy, decrease access to med. care, halfway, 

rehab)?


@  -No


Was there de-escalation of care discussed even if they declined (Discuss DNR or 

withdrawal of care, Hospice)? DNR status


@  -No


What co-morbidities impacted this encounter? (DM, HTN, Smoking, COPD, CAD, 

Cancer, CVA, ARF, Chemo, Hep., AIDS, mental health diagnosis, sleep apnea, 

morbid obesity)?


@  -None


Was patient admitted / discharged? Hospital course, mention meds given and 

route, prescriptions, significant lab abnormalities, going to OR and other 

pertinent info.


@ 43-year-old female with right ankle inversion.  Lateral soft tissue swelling. 

No gross deformity, distal pulses and cap refill intact.  X-ray negative for 

fracture or dislocation.  Patient placed in an Ace wrap and given crutches.  She

will elevate, ice, take Motrin for pain.  Patient has crutches and a walker at 

home.


Undiagnosed new problem with uncertain prognosis?


@  -No


Drug Therapy requiring intensive monitoring for toxicity (Heparin, Nitro, 

Insulin, Cardizem)?


@  -No


Were any procedures done?


@  -No


Diagnosis/symptom?


@  Right ankle sprain


Acute, or Chronic, or Acute on Chronic?


@  -Acute


Uncomplicated (without systemic symptoms) or Complicated (systemic symptoms)?


@  -default


Side effects of treatment?


@  -No


Exacerbation, Progression, or Severe Exacerbation?


@  -No


Poses a threat to life or bodily function? How? (Chest pain, USA, MI, pneumonia,

PE, COPD, DKA, ARF, appy, cholecystitis, CVA, Diverticulitis, Homicidal, Suicida

l, threat to staff... and all critical care pts)


@  -No





Disposition


Clinical Impression: 


 Right ankle sprain





Disposition: HOME SELF-CARE


Condition: Good


Instructions (If sedation given, give patient instructions):  Ankle Sprain (ED)


Prescriptions: 


Ibuprofen [Motrin] 600 mg PO Q8HR PRN #24 tab


 PRN Reason: Pain


Is patient prescribed a controlled substance at d/c from ED?: No


Referrals: 


Bobby Marin MD [Primary Care Provider] - 1-2 days


Time of Disposition: 09:44

## 2024-06-25 ENCOUNTER — HOSPITAL ENCOUNTER (OUTPATIENT)
Dept: HOSPITAL 47 - RADUSWWP | Age: 44
Discharge: HOME | End: 2024-06-25
Attending: FAMILY MEDICINE
Payer: COMMERCIAL

## 2024-06-25 DIAGNOSIS — Z79.01: ICD-10-CM

## 2024-06-25 DIAGNOSIS — Z86.718: ICD-10-CM

## 2024-06-25 DIAGNOSIS — R26.2: ICD-10-CM

## 2024-06-25 DIAGNOSIS — R60.0: Primary | ICD-10-CM

## 2024-06-25 DIAGNOSIS — L53.9: ICD-10-CM
